# Patient Record
Sex: MALE | Race: WHITE | HISPANIC OR LATINO | Employment: OTHER | URBAN - METROPOLITAN AREA
[De-identification: names, ages, dates, MRNs, and addresses within clinical notes are randomized per-mention and may not be internally consistent; named-entity substitution may affect disease eponyms.]

---

## 2017-09-21 PROBLEM — L40.9 PSORIASIS: Status: ACTIVE | Noted: 2017-09-21

## 2017-09-21 PROBLEM — Z28.21 REFUSED INFLUENZA VACCINE: Status: ACTIVE | Noted: 2017-09-21

## 2017-09-21 PROBLEM — I10 ESSENTIAL HYPERTENSION: Status: ACTIVE | Noted: 2017-09-21

## 2018-04-04 PROBLEM — M65.331 TRIGGER FINGER, RIGHT MIDDLE FINGER: Status: ACTIVE | Noted: 2018-04-04

## 2018-04-04 PROBLEM — R20.2 PARESTHESIA AND PAIN OF BOTH UPPER EXTREMITIES: Status: ACTIVE | Noted: 2018-04-04

## 2018-04-04 PROBLEM — M79.602 PARESTHESIA AND PAIN OF BOTH UPPER EXTREMITIES: Status: ACTIVE | Noted: 2018-04-04

## 2018-04-04 PROBLEM — M25.50 ARTHRALGIA: Status: ACTIVE | Noted: 2018-04-04

## 2018-04-04 PROBLEM — J45.20 MILD INTERMITTENT ASTHMA WITHOUT COMPLICATION: Status: ACTIVE | Noted: 2018-04-04

## 2018-04-04 PROBLEM — Z28.21 REFUSED INFLUENZA VACCINE: Status: RESOLVED | Noted: 2017-09-21 | Resolved: 2018-04-04

## 2018-04-04 PROBLEM — M54.32 LEFT SIDED SCIATICA: Status: ACTIVE | Noted: 2018-04-04

## 2018-04-04 PROBLEM — M79.601 PARESTHESIA AND PAIN OF BOTH UPPER EXTREMITIES: Status: ACTIVE | Noted: 2018-04-04

## 2018-04-26 ENCOUNTER — LAB VISIT (OUTPATIENT)
Dept: LAB | Facility: HOSPITAL | Age: 63
End: 2018-04-26
Attending: FAMILY MEDICINE
Payer: MEDICARE

## 2018-04-26 DIAGNOSIS — Z12.11 SCREENING FOR COLON CANCER: ICD-10-CM

## 2018-04-26 LAB — HEMOCCULT STL QL IA: POSITIVE

## 2018-04-26 PROCEDURE — 82274 ASSAY TEST FOR BLOOD FECAL: CPT

## 2018-11-14 ENCOUNTER — TELEPHONE (OUTPATIENT)
Dept: PHARMACY | Facility: CLINIC | Age: 63
End: 2018-11-14

## 2018-11-29 ENCOUNTER — TELEPHONE (OUTPATIENT)
Dept: PHARMACY | Facility: CLINIC | Age: 63
End: 2018-11-29

## 2018-11-29 NOTE — TELEPHONE ENCOUNTER
Initial Humira 40mg/0.8ml Pen Injection consult completed on . Humira 40mg/0.8ml Pen Injection will be shipped on  to arrive at patient's home on  via [employer]. $ 3.70 copay. Patient will start Humira 40mg/0.8ml Pen Injection on  at MD office. Address confirmed, CC on file. Confirmed 2 patient identifiers - name and . Therapy Appropriate.    Counseled patient on administration directions: Patient declined full consult - will get injection training at [facility] .  - Starter: Inject Humira 80mg (2 pens) into the skin on Day 1.  - Maintenance: Then, Inject Humira 40mg (1pen) into the skin every 14 days starting 1 week after first injection.  - Take out of the refrigerator 30-60 minutes prior to injection.  - Monthly RX will come with gauze, bandaids, and alcohol swabs.   - Patient will use sharps container; once full, per [place] law, he may lock the sharps container and place in her trash. He can then contact the Pharmacy and we will replace the sharps at no additional charge.    Patient was counseled on possible side effects:  - Injection site reaction: redness, soreness, itching, bruising, which should resolve within 3-5 days.   - Increased risk for infections.  If patient becomes sick, patient is to hold Humira use until he is better.     Patient verbalized understanding. Compliance stressed. Patient advised to keep a calendar marking dates of injections to ensure better compliance. Patient advised to call myself or provider should any questions arise. Patient plans to start Humira on  at MD office. Consultation included: indication; storage and handling. Patient understands to report any medication changes to [facility] and provider. All questions answered and addressed to patients satisfaction. I will f/u with her in 1 week from start, [facility] to contact patient in 3 weeks for refills.     Marissa Vargas, PharmD  [facility]  [phone]

## 2019-01-04 ENCOUNTER — TELEPHONE (OUTPATIENT)
Dept: PHARMACY | Facility: CLINIC | Age: 64
End: 2019-01-04

## 2019-02-04 ENCOUNTER — TELEPHONE (OUTPATIENT)
Dept: PHARMACY | Facility: CLINIC | Age: 64
End: 2019-02-04

## 2019-03-01 ENCOUNTER — TELEPHONE (OUTPATIENT)
Dept: PHARMACY | Facility: CLINIC | Age: 64
End: 2019-03-01

## 2019-04-04 ENCOUNTER — TELEPHONE (OUTPATIENT)
Dept: PHARMACY | Facility: CLINIC | Age: 64
End: 2019-04-04

## 2019-04-11 ENCOUNTER — PATIENT OUTREACH (OUTPATIENT)
Dept: ADMINISTRATIVE | Facility: HOSPITAL | Age: 64
End: 2019-04-11

## 2019-04-25 PROBLEM — Z12.11 SCREENING FOR COLON CANCER: Status: ACTIVE | Noted: 2019-04-25

## 2019-04-30 ENCOUNTER — TELEPHONE (OUTPATIENT)
Dept: PHARMACY | Facility: CLINIC | Age: 64
End: 2019-04-30

## 2019-05-23 ENCOUNTER — TELEPHONE (OUTPATIENT)
Dept: PHARMACY | Facility: CLINIC | Age: 64
End: 2019-05-23

## 2019-06-25 ENCOUNTER — TELEPHONE (OUTPATIENT)
Dept: PHARMACY | Facility: CLINIC | Age: 64
End: 2019-06-25

## 2019-06-26 NOTE — TELEPHONE ENCOUNTER
Refill call regarding Humira at [facility]. Will prepare for shipment on  to arrive  with son Vishnu consent. Copay 0.00. Patient has not started any new medications, no missed doses/ side effects. Patient did not have any concerns or questions for the pharmacist at this time.  & address verified. Next injection  with 0 injections on hand.

## 2019-07-23 ENCOUNTER — TELEPHONE (OUTPATIENT)
Dept: PHARMACY | Facility: CLINIC | Age: 64
End: 2019-07-23

## 2019-08-21 ENCOUNTER — TELEPHONE (OUTPATIENT)
Dept: PHARMACY | Facility: CLINIC | Age: 64
End: 2019-08-21

## 2019-09-17 ENCOUNTER — TELEPHONE (OUTPATIENT)
Dept: PHARMACY | Facility: CLINIC | Age: 64
End: 2019-09-17

## 2019-10-21 ENCOUNTER — TELEPHONE (OUTPATIENT)
Dept: PHARMACY | Facility: CLINIC | Age: 64
End: 2019-10-21

## 2019-11-13 ENCOUNTER — TELEPHONE (OUTPATIENT)
Dept: PHARMACY | Facility: CLINIC | Age: 64
End: 2019-11-13

## 2019-11-15 NOTE — TELEPHONE ENCOUNTER
Spoke to patient's son, Vishnu, in regards to Humira refill. Verified to ship on Monday 11/18 for delivery on Tuesday 11/19. $0.00 copay at 004.     Hilton Hutton, PharmD  Clinical Pharmacist  [facility]  P: [phone]

## 2019-12-11 ENCOUNTER — TELEPHONE (OUTPATIENT)
Dept: PHARMACY | Facility: CLINIC | Age: 64
End: 2019-12-11

## 2020-01-10 ENCOUNTER — TELEPHONE (OUTPATIENT)
Dept: PHARMACY | Facility: CLINIC | Age: 65
End: 2020-01-10

## 2020-01-10 NOTE — TELEPHONE ENCOUNTER
Call attempt 1: Unable to LVM regarding Humira refill and follow up. $1,985.14 copay (004). Appears patient has not renewed LIS. Will advise patient to call [phone]. Will get dose count and if needed, encourage patient to reach out to MD office for samples. LIS application may take 3-4 weeks.  is also taking 3-4 weeks.

## 2020-01-28 NOTE — TELEPHONE ENCOUNTER
Per patient's son Vishnu, he has not called to check on the status of LIS.  Informed son to reach out to [facility] and see how long they will be able to fill the Humira. Vishnu verbalized understanding. Consented to follow-up call in 1 week. Patient has 1 Humira injection in hand for today's dose.  Will close patient out and inform provider if so reports patient can continue to receive discounted Humira with [facility].

## 2020-02-07 NOTE — TELEPHONE ENCOUNTER
Spoke with patient's son Vishnu, he will pick-up Humira for patient from [facility] pharmacy [facility]. This is patient's last refill on current rx and LIS was denied. Vishnu will reach-out to dermatologist for more refills and will speak with pharmacy to find out if his father can continue to refill with [facility]. Son consented to a follow-up call in 2 week

## 2020-05-12 ENCOUNTER — TELEPHONE (OUTPATIENT)
Dept: PHARMACY | Facility: CLINIC | Age: 65
End: 2020-05-12

## 2020-05-12 NOTE — TELEPHONE ENCOUNTER
Reached patient's son regarding refill. Confirmed he is taking q 3 weeks, due 6/2. Will call closer to injection date

## 2020-05-18 PROBLEM — Z12.11 SCREENING FOR COLON CANCER: Status: RESOLVED | Noted: 2019-04-25 | Resolved: 2020-05-18

## 2020-05-21 ENCOUNTER — TELEPHONE (OUTPATIENT)
Dept: PHARMACY | Facility: CLINIC | Age: 65
End: 2020-05-21

## 2020-08-14 ENCOUNTER — TELEPHONE (OUTPATIENT)
Dept: PHARMACY | Facility: CLINIC | Age: 65
End: 2020-08-14

## 2020-09-08 ENCOUNTER — TELEPHONE (OUTPATIENT)
Dept: PHARMACY | Facility: CLINIC | Age: 65
End: 2020-09-08

## 2020-09-24 NOTE — TELEPHONE ENCOUNTER
Confirmed Humira shipment  to arrive to patient's home . Patient's son, Vishnu, reported that patient has no doses on hand for next dose due 10/6 and no missed doses in the past month. Address and  verified. $0 copay (004).

## 2020-10-21 ENCOUNTER — SPECIALTY PHARMACY (OUTPATIENT)
Dept: PHARMACY | Facility: CLINIC | Age: 65
End: 2020-10-21

## 2020-10-21 NOTE — TELEPHONE ENCOUNTER
Specialty Pharmacy - Refill Coordination    Specialty Medication Orders Linked to Encounter      Most Recent Value   Medication #1  adalimumab (HUMIRA PEN) 40 mg/0.8 mL PnKt (Order#[id number], Rx#[id number])          Refill Questions - Documented Responses      Most Recent Value   HIPAA/medical authority confirmed?  Yes   Any changes in contact preferences or allowed representatives?  No   Has the patient had any insurance changes?  No   Has the patient had any changes to specialty medication, dose, or instructions?  No   Has the patient started taking any new medications, herbals, or supplements?  No   Has the patient been diagnosed with any new medical conditions?  No   Does the patient have any new allergies to medications or foods?  No   Does the patient have any concerns about side effects?  No   Can the patient store medication/sharps container properly (at the correct temperature, away from children/pets, etc.)?  Yes   Can the patient call emergency services (911) in the event of an emergency?  Yes   Does the patient have any concerns or questions about taking or administering this medication as prescribed?  No   How many doses did the patient miss in the past 4 weeks or since the last fill?  0   How many doses does the patient have on hand?  0   Does the number of doses/days supply remaining match pharmacy expected amounts?  Yes   How will the patient receive the medication?  Mail   When does the patient need to receive the medication?  11/03/20   Shipping Address  Home   Address in [facility] confirmed and updated if neccessary?  Yes   Expected Copay ($)  0   Is the patient able to afford the medication copay?  Yes   Payment Method  zero copay   Days supply of Refill  28   Would patient like to speak to a pharmacist?  No   Do you want to trigger an intervention?  No   Do you want to trigger an additional referral task?  No   Refill activity completed?  Yes   Refill activity plan  Refill scheduled    Shipment/Pickup Date:  10/27/20          Current Outpatient Medications   Medication Sig    adalimumab (HUMIRA PEN) 40 mg/0.8 mL PnKt Inject 1 pen (40 mg total) into the skin every 14 (fourteen) days.    albuterol (PROVENTIL/VENTOLIN HFA) 90 mcg/actuation inhaler Inhale 2 puffs into the lungs every 6 (six) hours as needed for Wheezing.    aspirin (ECOTRIN) 81 MG EC tablet Take 81 mg by mouth once daily.    betamethasone dipropionate (DIPROLENE) 0.05 % cream Apply topically once daily.    budesonide (PULMICORT FLEXHALER) 90 mcg/actuation AePB Inhale 2 puffs (180 mcg total) into the lungs 2 (two) times daily. Controller    hydroCHLOROthiazide (HYDRODIURIL) 25 MG tablet Take 1 tablet (25 mg total) by mouth once daily.    lisinopriL (PRINIVIL,ZESTRIL) 40 MG tablet Take 1 tablet (40 mg total) by mouth every evening.    montelukast (SINGULAIR) 10 mg tablet Take 1 tablet (10 mg total) by mouth every evening.    multivitamin (ONE DAILY MULTIVITAMIN) per tablet Take 1 tablet by mouth once daily.    naproxen (NAPROSYN) 500 MG tablet Take 1 tablet (500 mg total) by mouth 2 (two) times daily with meals.   Last reviewed on 10/21/2020  9:40 AM by Ida Miranda PharmD    Review of patient's allergies indicates:   Allergen Reactions    Influenza virus vaccines Other (See Comments)    Last reviewed on  10/21/2020 9:40 AM by Ida Miranda      Tasks added this encounter   11/23/2020 - Refill Call (Auto Added)   Tasks due within next 3 months   12/24/2020 - Clinical - Follow Up Assesement (90 day)     Ida Miranda PharmD  [facility]  [address] [place] [id number]  Phone: [phone]  Fax: [phone]

## 2020-11-25 ENCOUNTER — SPECIALTY PHARMACY (OUTPATIENT)
Dept: PHARMACY | Facility: CLINIC | Age: 65
End: 2020-11-25

## 2020-11-25 NOTE — TELEPHONE ENCOUNTER
Specialty Pharmacy - Refill Coordination    Specialty Medication Orders Linked to Encounter      Most Recent Value   Medication #1  adalimumab (HUMIRA PEN) 40 mg/0.8 mL PnKt (Order#[id number], Rx#[id number])          Refill Questions - Documented Responses      Most Recent Value   Relationship to patient of person spoken to?  Self   HIPAA/medical authority confirmed?  Yes   Any changes in contact preferences or allowed representatives?  No   Has the patient had any insurance changes?  No   Has the patient had any changes to specialty medication, dose, or instructions?  No   Has the patient started taking any new medications, herbals, or supplements?  No   Has the patient been diagnosed with any new medical conditions?  No   Does the patient have any concerns about side effects?  No   Can the patient store medication/sharps container properly (at the correct temperature, away from children/pets, etc.)?  Yes   Can the patient call emergency services (911) in the event of an emergency?  Yes   Does the patient have any concerns or questions about taking or administering this medication as prescribed?  No   How many doses did the patient miss in the past 4 weeks or since the last fill?  0   How many doses does the patient have on hand?  0   How many days does the patient report on hand quantity will last?  6   Does the number of doses/days supply remaining match pharmacy expected amounts?  Yes   Does the patient feel that this medication is effective?  Yes   During the past 4 weeks, has patient missed any activities due to condition or medication?  No   During the past 4 weeks, did patient have any of the following urgent care visits?  None   How will the patient receive the medication?  Mail   When does the patient need to receive the medication?  12/01/20   Shipping Address  Home   Address in [facility] confirmed and updated if neccessary?  Yes   Expected Copay ($)  0   Is the patient able to afford the  medication copay?  Yes   Payment Method  zero copay   Days supply of Refill  28   Would patient like to speak to a pharmacist?  No   Do you want to trigger an intervention?  No   Do you want to trigger an additional referral task?  No   Refill activity completed?  Yes   Refill activity plan  Refill scheduled   Shipment/Pickup Date:  11/30/20          Current Outpatient Medications   Medication Sig    adalimumab (HUMIRA PEN) 40 mg/0.8 mL PnKt Inject 1 pen (40 mg total) into the skin every 14 (fourteen) days.    albuterol (PROVENTIL/VENTOLIN HFA) 90 mcg/actuation inhaler Inhale 2 puffs into the lungs every 6 (six) hours as needed for Wheezing.    aspirin (ECOTRIN) 81 MG EC tablet Take 81 mg by mouth once daily.    betamethasone dipropionate (DIPROLENE) 0.05 % cream Apply topically once daily.    budesonide (PULMICORT FLEXHALER) 90 mcg/actuation AePB Inhale 2 puffs (180 mcg total) into the lungs 2 (two) times daily. Controller    hydroCHLOROthiazide (HYDRODIURIL) 25 MG tablet Take 1 tablet (25 mg total) by mouth once daily.    hyoscyamine (ANASPAZ,LEVSIN) 0.125 mg Tab Take 1 tablet (125 mcg total) by mouth every 4 (four) hours as needed.    lisinopriL (PRINIVIL,ZESTRIL) 40 MG tablet Take 1 tablet (40 mg total) by mouth every evening.    montelukast (SINGULAIR) 10 mg tablet Take 1 tablet (10 mg total) by mouth every evening.    multivitamin (ONE DAILY MULTIVITAMIN) per tablet Take 1 tablet by mouth once daily.    naproxen (NAPROSYN) 500 MG tablet Take 1 tablet (500 mg total) by mouth 2 (two) times daily with meals.    ondansetron (ZOFRAN) 4 MG tablet Take 1 tablet (4 mg total) by mouth every 6 (six) hours.   Last reviewed on 11/25/2020  4:18 PM by Loida Redding    Review of patient's allergies indicates:   Allergen Reactions    Influenza virus vaccines Other (See Comments)    Last reviewed on  11/25/2020 4:18 PM by Loida Redding      Tasks added this encounter   No tasks added.   Tasks due within next 3  months   12/24/2020 - Clinical - Follow Up Assesement (90 day)  11/23/2020 - Refill Call (Auto Added)     RENATO ROBLES  [facility]  [address]  [facility] [address]  Phone: [phone]  Fax: [phone]

## 2020-12-21 ENCOUNTER — SPECIALTY PHARMACY (OUTPATIENT)
Dept: PHARMACY | Facility: CLINIC | Age: 65
End: 2020-12-21

## 2020-12-21 NOTE — TELEPHONE ENCOUNTER
Specialty Pharmacy - Refill Coordination    Specialty Medication Orders Linked to Encounter      Most Recent Value   Medication #1  adalimumab (HUMIRA PEN) 40 mg/0.8 mL PnKt (Order#[id number], Rx#[id number])          Refill Questions - Documented Responses      Most Recent Value   Relationship to patient of person spoken to?  Self   HIPAA/medical authority confirmed?  Yes   Any changes in contact preferences or allowed representatives?  No   Has the patient had any insurance changes?  No   Has the patient had any changes to specialty medication, dose, or instructions?  No   Has the patient started taking any new medications, herbals, or supplements?  No   Has the patient been diagnosed with any new medical conditions?  No   Does the patient have any new allergies to medications or foods?  No   Does the patient have any concerns about side effects?  No   Can the patient store medication/sharps container properly (at the correct temperature, away from children/pets, etc.)?  Yes   Can the patient call emergency services (911) in the event of an emergency?  Yes   Does the patient have any concerns or questions about taking or administering this medication as prescribed?  No   How many doses did the patient miss in the past 4 weeks or since the last fill?  0   How many doses does the patient have on hand?  0   How many days does the patient report on hand quantity will last?  0   Does the number of doses/days supply remaining match pharmacy expected amounts?  Yes   Does the patient feel that this medication is effective?  Yes   During the past 4 weeks, has patient missed any activities due to condition or medication?  No   During the past 4 weeks, did patient have any of the following urgent care visits?  None   How will the patient receive the medication?  Mail   When does the patient need to receive the medication?  12/29/20   Shipping Address  Home   Expected Copay ($)  0   Is the patient able to afford the medication  copay?  Yes   Payment Method  zero copay   Days supply of Refill  28   Would patient like to speak to a pharmacist?  No   Do you want to trigger an intervention?  No   Do you want to trigger an additional referral task?  No   Refill activity completed?  Yes   Refill activity plan  Refill scheduled   Shipment/Pickup Date:  12/22/20          Current Outpatient Medications   Medication Sig    adalimumab (HUMIRA PEN) 40 mg/0.8 mL PnKt Inject 1 pen (40 mg total) into the skin every 14 (fourteen) days.    albuterol (PROVENTIL/VENTOLIN HFA) 90 mcg/actuation inhaler Inhale 2 puffs into the lungs every 6 (six) hours as needed for Wheezing.    aspirin (ECOTRIN) 81 MG EC tablet Take 81 mg by mouth once daily.    betamethasone dipropionate (DIPROLENE) 0.05 % cream Apply topically once daily.    budesonide (PULMICORT FLEXHALER) 90 mcg/actuation AePB Inhale 2 puffs (180 mcg total) into the lungs 2 (two) times daily. Controller    hydroCHLOROthiazide (HYDRODIURIL) 25 MG tablet Take 1 tablet (25 mg total) by mouth once daily.    hyoscyamine (ANASPAZ,LEVSIN) 0.125 mg Tab Take 1 tablet (125 mcg total) by mouth every 4 (four) hours as needed.    lisinopriL (PRINIVIL,ZESTRIL) 40 MG tablet Take 1 tablet (40 mg total) by mouth every evening.    montelukast (SINGULAIR) 10 mg tablet Take 1 tablet (10 mg total) by mouth every evening.    multivitamin (ONE DAILY MULTIVITAMIN) per tablet Take 1 tablet by mouth once daily.    naproxen (NAPROSYN) 500 MG tablet Take 1 tablet (500 mg total) by mouth 2 (two) times daily with meals.    ondansetron (ZOFRAN) 4 MG tablet Take 1 tablet (4 mg total) by mouth every 6 (six) hours.   Last reviewed on 11/25/2020  4:18 PM by Loida Redding    Review of patient's allergies indicates:   Allergen Reactions    Influenza virus vaccines Other (See Comments)    Last reviewed on  11/25/2020 4:18 PM by Loida Redding      Tasks added this encounter   1/18/2021 - Refill Call (Auto Added)   Tasks due  within next 3 months   No tasks due.     Julio Cesar Chakraborty  [facility]  [address] [address]  Phone: [phone]  Fax: [phone]

## 2021-02-12 ENCOUNTER — SPECIALTY PHARMACY (OUTPATIENT)
Dept: PHARMACY | Facility: CLINIC | Age: 66
End: 2021-02-12

## 2021-03-12 ENCOUNTER — SPECIALTY PHARMACY (OUTPATIENT)
Dept: PHARMACY | Facility: CLINIC | Age: 66
End: 2021-03-12

## 2021-04-05 ENCOUNTER — SPECIALTY PHARMACY (OUTPATIENT)
Dept: PHARMACY | Facility: CLINIC | Age: 66
End: 2021-04-05

## 2021-05-04 ENCOUNTER — SPECIALTY PHARMACY (OUTPATIENT)
Dept: PHARMACY | Facility: CLINIC | Age: 66
End: 2021-05-04

## 2021-05-04 ENCOUNTER — PATIENT MESSAGE (OUTPATIENT)
Dept: RESEARCH | Facility: HOSPITAL | Age: 66
End: 2021-05-04

## 2021-06-22 ENCOUNTER — SPECIALTY PHARMACY (OUTPATIENT)
Dept: PHARMACY | Facility: CLINIC | Age: 66
End: 2021-06-22

## 2021-07-15 ENCOUNTER — SPECIALTY PHARMACY (OUTPATIENT)
Dept: PHARMACY | Facility: CLINIC | Age: 66
End: 2021-07-15

## 2021-08-13 ENCOUNTER — SPECIALTY PHARMACY (OUTPATIENT)
Dept: PHARMACY | Facility: CLINIC | Age: 66
End: 2021-08-13

## 2021-09-17 ENCOUNTER — SPECIALTY PHARMACY (OUTPATIENT)
Dept: PHARMACY | Facility: CLINIC | Age: 66
End: 2021-09-17

## 2021-10-25 ENCOUNTER — SPECIALTY PHARMACY (OUTPATIENT)
Dept: PHARMACY | Facility: CLINIC | Age: 66
End: 2021-10-25
Payer: MEDICARE

## 2021-11-23 ENCOUNTER — SPECIALTY PHARMACY (OUTPATIENT)
Dept: PHARMACY | Facility: CLINIC | Age: 66
End: 2021-11-23
Payer: MEDICARE

## 2022-01-04 ENCOUNTER — SPECIALTY PHARMACY (OUTPATIENT)
Dept: PHARMACY | Facility: CLINIC | Age: 67
End: 2022-01-04
Payer: MEDICARE

## 2022-01-06 NOTE — TELEPHONE ENCOUNTER
Specialty Pharmacy - Refill Coordination    Specialty Medication Orders Linked to Encounter    Flowsheet Row Most Recent Value   Medication #1 adalimumab (HUMIRA PEN) PnKt injection (Order#[id number], Rx#)          Refill Questions - Documented Responses    Flowsheet Row Most Recent Value   Patient Availability and HIPAA Verification    Does patient want to proceed with activity? Yes   HIPAA/medical authority confirmed? Yes   Relationship to patient of person spoken to? Child   Refill Screening Questions    Changes to allergies? No   Changes to medications? No   New conditions since last clinic visit? No   Unplanned office visit, urgent care, ED, or hospital admission in the last 4 weeks? Yes  [[facility] visit for [facility],  given abx]   How does patient/caregiver feel medication is working? Good   Financial problems or insurance changes? No   How many doses of your specialty medications were missed in the last 4 weeks? 1  [Delayed 1 dose due to URI]   Why were doses missed? Felt ill or sick   Would patient like to speak to a pharmacist? No   When does the patient need to receive the medication? 01/19/22   Refill Delivery Questions    How will the patient receive the medication? Delivery Angie   When does the patient need to receive the medication? 01/19/22   Shipping Address Home   Address in [facility] confirmed and updated if neccessary? Yes   Expected Copay ($) 9.85   Is the patient able to afford the medication copay? Yes   Payment Method CC on file   Days supply of Refill 28   Supplies needed? No supplies needed   Refill activity completed? Yes   Refill activity plan Refill scheduled   Shipment/Pickup Date: 01/13/22          Current Outpatient Medications   Medication Sig    adalimumab (HUMIRA PEN) PnKt injection Inject 1 pen (40 mg total) into the skin every 14 (fourteen) days.    aspirin (ECOTRIN) 81 MG EC tablet Take 81 mg by mouth once daily.    betamethasone dipropionate (DIPROLENE) 0.05 % cream Apply  topically once daily.    budesonide (PULMICORT FLEXHALER) 90 mcg/actuation AePB Inhale 2 puffs (180 mcg total) into the lungs 2 (two) times daily. Controller (Patient not taking: Reported on 2/9/2021)    hydroCHLOROthiazide (HYDRODIURIL) 25 MG tablet Take 1 tablet by mouth once daily    hyoscyamine (ANASPAZ,LEVSIN) 0.125 mg Tab Take 1 tablet (125 mcg total) by mouth every 4 (four) hours as needed.    lisinopriL (PRINIVIL,ZESTRIL) 40 MG tablet TAKE 1 TABLET BY MOUTH ONCE DAILY IN THE EVENING    montelukast (SINGULAIR) 10 mg tablet Take 1 tablet (10 mg total) by mouth every evening.    multivitamin (THERAGRAN) per tablet Take 1 tablet by mouth once daily.    naproxen (NAPROSYN) 500 MG tablet TAKE 1 TABLET BY MOUTH TWICE DAILY WITH MEALS    ondansetron (ZOFRAN) 4 MG tablet Take 1 tablet (4 mg total) by mouth every 6 (six) hours. (Patient not taking: No sig reported)    VENTOLIN HFA 90 mcg/actuation inhaler INHALE 2 PUFFS BY MOUTH EVERY 6 HOURS AS NEEDED FOR WHEEZING   Last reviewed on 12/7/2021  2:59 PM by Esther Toribio MA    Review of patient's allergies indicates:   Allergen Reactions    Influenza virus vaccines Other (See Comments)    Last reviewed on  1/4/2022 4:41 PM by John Benavides      Tasks added this encounter   2/9/2022 - Refill Call (Auto Added)   Tasks due within next 3 months   No tasks due.     Ida Miranda, PharmD  [facility]  [address] [place] [id number]  Phone: [phone]  Fax: [phone]

## 2022-02-09 ENCOUNTER — SPECIALTY PHARMACY (OUTPATIENT)
Dept: PHARMACY | Facility: CLINIC | Age: 67
End: 2022-02-09
Payer: MEDICARE

## 2022-02-09 NOTE — TELEPHONE ENCOUNTER
2/9 - Incoming call from pt's son (Vishnu), stated pt had flu and fever last week, went to urgent care, took Tylenol for fever and Guaifenesin for cough. Did not take humira dose, unsure if has dose and when will father be taking medication. Will call [facility] back today around 5pm to confirm before setting up refill.    PTL

## 2022-02-16 ENCOUNTER — PATIENT MESSAGE (OUTPATIENT)
Dept: PHARMACY | Facility: CLINIC | Age: 67
End: 2022-02-16
Payer: MEDICARE

## 2022-02-16 NOTE — TELEPHONE ENCOUNTER
Spoke to pt's son. He reported pt's fever has resolved, but is still experiencing cough. Pt will continue to hold Humira until all symptoms have resolved. He reported pt has 1 pen on hand. He is agreeable to f/u call next week to see how pt is feeling and if cleared to resume Humira. If so, will schedule refill for pt.

## 2022-02-24 ENCOUNTER — SPECIALTY PHARMACY (OUTPATIENT)
Dept: PHARMACY | Facility: CLINIC | Age: 67
End: 2022-02-24
Payer: MEDICARE

## 2022-02-24 DIAGNOSIS — L40.9 PSORIASIS: Primary | ICD-10-CM

## 2022-02-24 NOTE — TELEPHONE ENCOUNTER
Specialty Pharmacy - Refill Coordination    Specialty Medication Orders Linked to Encounter    Flowsheet Row Most Recent Value   Medication #1 adalimumab (HUMIRA PEN) PnKt injection (Order#[id number], Rx#[id number])      Pt son reports he resume Humira 2/22/22 without any issue. He reports coughing has resolved and all symptoms of sickness resolved. Pt son would like to go ahead and schedule delivery since they will be busy next week. Refill set up pt advised to keep Humira stored in fridge until needed. Voiced understanding.  No further questions or concerns.     Refill Questions - Documented Responses    Flowsheet Row Most Recent Value   Patient Availability and HIPAA Verification    Does patient want to proceed with activity? Yes   HIPAA/medical authority confirmed? Yes   Relationship to patient of person spoken to? Self   Refill Screening Questions    Changes to allergies? No   Changes to medications? --  [albuterol nebulizer- no DDi]   New conditions since last clinic visit? No   Unplanned office visit, urgent care, ED, or hospital admission in the last 4 weeks? --  [addressed in ivent]   How does patient/caregiver feel medication is working? Good   Financial problems or insurance changes? No   How many doses of your specialty medications were missed in the last 4 weeks? 1  [addressed in ivent- no further intervention necessary]   Why were doses missed? Felt ill or sick  [addressed in ivent]   Would patient like to speak to a pharmacist? No   When does the patient need to receive the medication? 03/08/22   Refill Delivery Questions    How will the patient receive the medication? Delivery Angie   When does the patient need to receive the medication? 03/08/22   Shipping Address Home   Address in [facility] confirmed and updated if neccessary? Yes   Expected Copay ($) 9.85   Is the patient able to afford the medication copay? Yes   Payment Method CC on file   Days supply of Refill 28   Supplies needed? No  supplies needed   Refill activity completed? Yes   Refill activity plan Refill scheduled   Shipment/Pickup Date: 03/03/22          Current Outpatient Medications   Medication Sig    adalimumab (HUMIRA PEN) PnKt injection Inject 1 pen (40 mg total) into the skin every 14 (fourteen) days.    aspirin (ECOTRIN) 81 MG EC tablet Take 81 mg by mouth once daily.    betamethasone dipropionate (DIPROLENE) 0.05 % cream Apply topically once daily.    budesonide (PULMICORT FLEXHALER) 90 mcg/actuation AePB Inhale 2 puffs (180 mcg total) into the lungs 2 (two) times daily. Controller (Patient not taking: Reported on 2/9/2021)    hydroCHLOROthiazide (HYDRODIURIL) 25 MG tablet Take 1 tablet by mouth once daily    hyoscyamine (ANASPAZ,LEVSIN) 0.125 mg Tab Take 1 tablet (125 mcg total) by mouth every 4 (four) hours as needed.    lisinopriL (PRINIVIL,ZESTRIL) 40 MG tablet TAKE 1 TABLET BY MOUTH ONCE DAILY IN THE EVENING    montelukast (SINGULAIR) 10 mg tablet Take 1 tablet (10 mg total) by mouth every evening.    multivitamin (THERAGRAN) per tablet Take 1 tablet by mouth once daily.    naproxen (NAPROSYN) 500 MG tablet TAKE 1 TABLET BY MOUTH TWICE DAILY WITH MEALS    ondansetron (ZOFRAN) 4 MG tablet Take 1 tablet (4 mg total) by mouth every 6 (six) hours. (Patient not taking: No sig reported)    VENTOLIN HFA 90 mcg/actuation inhaler INHALE 2 PUFFS BY MOUTH EVERY 6 HOURS AS NEEDED FOR WHEEZING   Last reviewed on 12/7/2021  2:59 PM by Esther Toribio MA    Review of patient's allergies indicates:   Allergen Reactions    Influenza virus vaccines Other (See Comments)    Last reviewed on  1/4/2022 4:41 PM by John Benavides      Tasks added this encounter   3/29/2022 - Refill Call (Auto Added)   Tasks due within next 3 months   No tasks due.     Belkis Gage, LaurieD  [facility]  [address] [place] [address]  Phone: [phone]  Fax: [phone]

## 2022-03-29 ENCOUNTER — SPECIALTY PHARMACY (OUTPATIENT)
Dept: PHARMACY | Facility: CLINIC | Age: 67
End: 2022-03-29
Payer: MEDICARE

## 2022-03-29 NOTE — TELEPHONE ENCOUNTER
Outgoing call regarding Humira refill. Pts son Vishnu stated that he has 1 dose on hand and it not due to inject till April 5th and Vishnu stated he would like a call back on 4/5/22 to set up next refill please.

## 2022-04-05 NOTE — TELEPHONE ENCOUNTER
Specialty Pharmacy - Refill Coordination    Specialty Medication Orders Linked to Encounter    Flowsheet Row Most Recent Value   Medication #1 adalimumab (HUMIRA PEN) PnKt injection (Order#[id number], Rx#[id number])          Refill Questions - Documented Responses    Flowsheet Row Most Recent Value   Patient Availability and HIPAA Verification    Does patient want to proceed with activity? Yes   HIPAA/medical authority confirmed? Yes   Relationship to patient of person spoken to? Child   Refill Screening Questions    Changes to allergies? No   Changes to medications? No   New conditions since last clinic visit? No   Unplanned office visit, urgent care, ED, or hospital admission in the last 4 weeks? No   How does patient/caregiver feel medication is working? Excellent   Financial problems or insurance changes? No   How many doses of your specialty medications were missed in the last 4 weeks? 0   Would patient like to speak to a pharmacist? No   When does the patient need to receive the medication? 04/06/22   Refill Delivery Questions    How will the patient receive the medication? Delivery Angie   When does the patient need to receive the medication? 04/06/22   Shipping Address Home   Address in [facility] confirmed and updated if neccessary? Yes   Expected Copay ($) 0   Is the patient able to afford the medication copay? Yes   Payment Method zero copay   Days supply of Refill 28   Supplies needed? No supplies needed   Refill activity completed? Yes   Refill activity plan Refill scheduled   Shipment/Pickup Date: 04/06/22          Current Outpatient Medications   Medication Sig    adalimumab (HUMIRA PEN) PnKt injection Inject 1 pen (40 mg total) into the skin every 14 (fourteen) days.    aspirin (ECOTRIN) 81 MG EC tablet Take 81 mg by mouth once daily.    budesonide (PULMICORT FLEXHALER) 90 mcg/actuation AePB Inhale 2 puffs (180 mcg total) into the lungs 2 (two) times daily. Controller (Patient not taking:  Reported on 2/9/2021)    hydroCHLOROthiazide (HYDRODIURIL) 25 MG tablet Take 1 tablet by mouth once daily    hyoscyamine (ANASPAZ,LEVSIN) 0.125 mg Tab Take 1 tablet (125 mcg total) by mouth every 4 (four) hours as needed.    lisinopriL (PRINIVIL,ZESTRIL) 40 MG tablet TAKE 1 TABLET BY MOUTH ONCE DAILY IN THE EVENING    montelukast (SINGULAIR) 10 mg tablet Take 1 tablet (10 mg total) by mouth every evening. (Patient not taking: Reported on 4/5/2022)    multivitamin (THERAGRAN) per tablet Take 1 tablet by mouth once daily.    naproxen (NAPROSYN) 500 MG tablet TAKE 1 TABLET BY MOUTH TWICE DAILY WITH MEALS    TRELEGY ELLIPTA 200-62.5-25 mcg inhaler INHALE 1 PUFF ONCE DAILY    VENTOLIN HFA 90 mcg/actuation inhaler INHALE 2 PUFFS BY MOUTH EVERY 6 HOURS AS NEEDED FOR WHEEZING   Last reviewed on 4/5/2022  8:33 AM by John Benavides MD    Review of patient's allergies indicates:   Allergen Reactions    Influenza virus vaccines Other (See Comments)    Last reviewed on  4/5/2022 8:33 AM by John Benavides      Tasks added this encounter   No tasks added.   Tasks due within next 3 months   4/5/2022 - Refill Call (Auto Added)     Laurie PierceD  [facility]  [facility] [place] [address]  [facility] [address]  Phone: [phone]  Fax: [phone]

## 2022-04-18 ENCOUNTER — PATIENT MESSAGE (OUTPATIENT)
Dept: ADMINISTRATIVE | Facility: OTHER | Age: 67
End: 2022-04-18
Payer: MEDICARE

## 2022-05-18 ENCOUNTER — SPECIALTY PHARMACY (OUTPATIENT)
Dept: PHARMACY | Facility: CLINIC | Age: 67
End: 2022-05-18
Payer: MEDICARE

## 2022-05-18 DIAGNOSIS — L40.9 PSORIASIS: Primary | ICD-10-CM

## 2022-05-18 NOTE — TELEPHONE ENCOUNTER
Specialty Pharmacy - Refill Coordination    Specialty Medication Orders Linked to Encounter    Flowsheet Row Most Recent Value   Medication #1 adalimumab (HUMIRA PEN) PnKt injection (Order#[id number], Rx#[id number])          Refill Questions - Documented Responses    Flowsheet Row Most Recent Value   Patient Availability and HIPAA Verification    Does patient want to proceed with activity? Yes   HIPAA/medical authority confirmed? Yes   Relationship to patient of person spoken to? Child   Refill Screening Questions    Changes to allergies? No   Changes to medications? No   New conditions since last clinic visit? No   Unplanned office visit, urgent care, ED, or hospital admission in the last 4 weeks? No   How does patient/caregiver feel medication is working? Very good   Financial problems or insurance changes? No   How many doses of your specialty medications were missed in the last 4 weeks? 0   Would patient like to speak to a pharmacist? No   When does the patient need to receive the medication? 05/22/22   Refill Delivery Questions    How will the patient receive the medication? Delivery Angie   When does the patient need to receive the medication? 05/22/22   Shipping Address Home   Address in [facility] confirmed and updated if neccessary? Yes   Expected Copay ($) 0   Is the patient able to afford the medication copay? Yes   Payment Method zero copay   Days supply of Refill 28   Supplies needed? No supplies needed   Refill activity completed? Yes   Refill activity plan Refill scheduled   Shipment/Pickup Date: 05/22/22          Current Outpatient Medications   Medication Sig    adalimumab (HUMIRA PEN) PnKt injection Inject 1 pen (40 mg total) into the skin every 14 (fourteen) days.    aspirin (ECOTRIN) 81 MG EC tablet Take 81 mg by mouth once daily.    azithromycin (Z-JOYCELYN) 250 MG tablet Take 1 tablet (250 mg total) by mouth once daily. Take first 2 tablets together, then 1 every day until finished.     budesonide (PULMICORT FLEXHALER) 90 mcg/actuation AePB Inhale 2 puffs (180 mcg total) into the lungs 2 (two) times daily. Controller (Patient not taking: Reported on 2/9/2021)    hydroCHLOROthiazide (HYDRODIURIL) 25 MG tablet Take 1 tablet by mouth once daily    hyoscyamine (ANASPAZ,LEVSIN) 0.125 mg Tab Take 1 tablet (125 mcg total) by mouth every 4 (four) hours as needed.    lisinopriL (PRINIVIL,ZESTRIL) 40 MG tablet TAKE 1 TABLET BY MOUTH ONCE DAILY IN THE EVENING    montelukast (SINGULAIR) 10 mg tablet Take 1 tablet (10 mg total) by mouth every evening. (Patient not taking: Reported on 4/5/2022)    multivitamin (THERAGRAN) per tablet Take 1 tablet by mouth once daily.    naproxen (NAPROSYN) 500 MG tablet TAKE 1 TABLET BY MOUTH TWICE DAILY WITH MEALS    TRELEGY ELLIPTA 200-62.5-25 mcg inhaler INHALE 1 PUFF ONCE DAILY    VENTOLIN HFA 90 mcg/actuation inhaler INHALE 2 PUFFS BY MOUTH EVERY 6 HOURS AS NEEDED FOR WHEEZING   Last reviewed on 4/5/2022  8:33 AM by John Benavides MD    Review of patient's allergies indicates:   Allergen Reactions    Influenza virus vaccines Other (See Comments)    Last reviewed on  4/23/2022 3:17 PM by Dana Moody      Tasks added this encounter   6/12/2022 - Refill Call (Auto Added)   Tasks due within next 3 months   No tasks due.     Belen Tavares, PharmD  [facility]  [address] [place] [address]  Phone: [phone]  Fax: [phone]

## 2022-06-10 ENCOUNTER — SPECIALTY PHARMACY (OUTPATIENT)
Dept: PHARMACY | Facility: CLINIC | Age: 67
End: 2022-06-10
Payer: MEDICARE

## 2022-06-10 NOTE — TELEPHONE ENCOUNTER
Specialty Pharmacy - Clinical Reassessment    Specialty Medication Orders Linked to Encounter    Flowsheet Row Most Recent Value   Medication #1 adalimumab (HUMIRA PEN) PnKt injection (Order#[id number], Rx#[id number])        Patient Diagnosis   L40.9 - Psoriasis    Specialty clinical pharmacist review completed for an annual review of reassessment. Reviewed the following areas: current med list, reports of adverse effects, adherence and progress towards therapeutic goals.    Recommendations: none at this time.   Patient had recent gaps in therapy due to illness. Patient appropriately held doses until illness resolved, received refills once safe to resume therapy.    Tasks added this encounter   3/10/2023 - Clinical - Follow Up Assesement (Annual)   Tasks due within next 3 months   6/12/2022 - Refill Call (Auto Added)     Karo Robb, PharmD  [facility]  [address] [place], [place] [address]  [facility] [address]  Phone: [phone]  Fax: [phone]

## 2022-06-14 ENCOUNTER — SPECIALTY PHARMACY (OUTPATIENT)
Dept: PHARMACY | Facility: CLINIC | Age: 67
End: 2022-06-14
Payer: MEDICARE

## 2022-06-14 NOTE — TELEPHONE ENCOUNTER
Specialty Pharmacy - Refill Coordination    Specialty Medication Orders Linked to Encounter    Flowsheet Row Most Recent Value   Medication #1 adalimumab (HUMIRA PEN) PnKt injection (Order#[id number], Rx#[id number])          Refill Questions - Documented Responses    Flowsheet Row Most Recent Value   Patient Availability and HIPAA Verification    Does patient want to proceed with activity? Yes   HIPAA/medical authority confirmed? Yes   Relationship to patient of person spoken to? Self   Refill Screening Questions    Changes to allergies? No   Changes to medications? Yes   New conditions since last clinic visit? No   Unplanned office visit, urgent care, ED, or hospital admission in the last 4 weeks? No   How does patient/caregiver feel medication is working? Good   Financial problems or insurance changes? No   How many doses of your specialty medications were missed in the last 4 weeks? 0   Would patient like to speak to a pharmacist? No   When does the patient need to receive the medication? 06/23/22   Refill Delivery Questions    How will the patient receive the medication? Delivery Angie   When does the patient need to receive the medication? 06/23/22   Shipping Address Home   Address in [facility] confirmed and updated if neccessary? Yes   Expected Copay ($) 0   Is the patient able to afford the medication copay? Yes   Payment Method zero copay   Days supply of Refill 28   Supplies needed? No supplies needed   Refill activity completed? Yes   Refill activity plan Refill scheduled   Shipment/Pickup Date: 06/17/22          Current Outpatient Medications   Medication Sig    adalimumab (HUMIRA PEN) PnKt injection Inject 1 pen (40 mg total) into the skin every 14 (fourteen) days.    aspirin (ECOTRIN) 81 MG EC tablet Take 81 mg by mouth once daily.    azithromycin (Z-JOYCELYN) 250 MG tablet Take 1 tablet (250 mg total) by mouth once daily. Take first 2 tablets together, then 1 every day until finished.     budesonide (PULMICORT FLEXHALER) 90 mcg/actuation AePB Inhale 2 puffs (180 mcg total) into the lungs 2 (two) times daily. Controller (Patient not taking: Reported on 2/9/2021)    hydroCHLOROthiazide (HYDRODIURIL) 25 MG tablet Take 1 tablet by mouth once daily    hyoscyamine (ANASPAZ,LEVSIN) 0.125 mg Tab Take 1 tablet (125 mcg total) by mouth every 4 (four) hours as needed.    lisinopriL (PRINIVIL,ZESTRIL) 40 MG tablet TAKE 1 TABLET BY MOUTH ONCE DAILY IN THE EVENING    montelukast (SINGULAIR) 10 mg tablet Take 1 tablet (10 mg total) by mouth every evening. (Patient not taking: Reported on 4/5/2022)    multivitamin (THERAGRAN) per tablet Take 1 tablet by mouth once daily.    naproxen (NAPROSYN) 500 MG tablet TAKE 1 TABLET BY MOUTH TWICE DAILY WITH MEALS    TRELEGY ELLIPTA 200-62.5-25 mcg inhaler INHALE 1 PUFF ONCE DAILY    VENTOLIN HFA 90 mcg/actuation inhaler INHALE 2 PUFFS BY MOUTH EVERY 6 HOURS AS NEEDED FOR WHEEZING   Last reviewed on 4/5/2022  8:33 AM by John Benavides MD    Review of patient's allergies indicates:   Allergen Reactions    Influenza virus vaccines Other (See Comments)    Last reviewed on  4/23/2022 3:17 PM by Dana Moody    Interventions added this encounter   Open: [facility] Patient Intervention     Tasks added this encounter   7/14/2022 - Refill Call (Auto Added)   Tasks due within next 3 months   No tasks due.     Aarti Dorsey, Patient Care Assistant  [facility]  [facility] [address]  [facility] [address]  Phone: [phone]  Fax: [phone]

## 2022-07-14 ENCOUNTER — SPECIALTY PHARMACY (OUTPATIENT)
Dept: PHARMACY | Facility: CLINIC | Age: 67
End: 2022-07-14
Payer: MEDICARE

## 2022-07-14 NOTE — TELEPHONE ENCOUNTER
Specialty Pharmacy - Refill Coordination    Specialty Medication Orders Linked to Encounter    Flowsheet Row Most Recent Value   Medication #1 adalimumab (HUMIRA PEN) PnKt injection (Order#[id number], Rx#[id number])          Refill Questions - Documented Responses    Flowsheet Row Most Recent Value   Patient Availability and HIPAA Verification    Does patient want to proceed with activity? Yes   HIPAA/medical authority confirmed? Yes   Relationship to patient of person spoken to? Child   Refill Screening Questions    Changes to allergies? No   Changes to medications? No   New conditions since last clinic visit? No   Unplanned office visit, urgent care, ED, or hospital admission in the last 4 weeks? No   How does patient/caregiver feel medication is working? Good   Financial problems or insurance changes? No   How many doses of your specialty medications were missed in the last 4 weeks? 0   Would patient like to speak to a pharmacist? No   When does the patient need to receive the medication? 07/21/22   Refill Delivery Questions    How will the patient receive the medication? Delivery Angie   When does the patient need to receive the medication? 07/21/22   Shipping Address Home   Address in [facility] confirmed and updated if neccessary? Yes   Expected Copay ($) 0   Is the patient able to afford the medication copay? Yes   Payment Method zero copay   Days supply of Refill 28   Supplies needed? No supplies needed   Refill activity completed? Yes   Refill activity plan Refill scheduled   Shipment/Pickup Date: 07/18/22          Current Outpatient Medications   Medication Sig    adalimumab (HUMIRA PEN) PnKt injection Inject 1 pen (40 mg total) into the skin every 14 (fourteen) days.    aspirin (ECOTRIN) 81 MG EC tablet Take 81 mg by mouth once daily.    azithromycin (Z-JOYCELYN) 250 MG tablet Take 1 tablet (250 mg total) by mouth once daily. Take first 2 tablets together, then 1 every day until finished.     budesonide (PULMICORT FLEXHALER) 90 mcg/actuation AePB Inhale 2 puffs (180 mcg total) into the lungs 2 (two) times daily. Controller (Patient not taking: Reported on 2/9/2021)    hydroCHLOROthiazide (HYDRODIURIL) 25 MG tablet Take 1 tablet by mouth once daily    hyoscyamine (ANASPAZ,LEVSIN) 0.125 mg Tab Take 1 tablet (125 mcg total) by mouth every 4 (four) hours as needed.    lisinopriL (PRINIVIL,ZESTRIL) 40 MG tablet TAKE 1 TABLET BY MOUTH ONCE DAILY IN THE EVENING    montelukast (SINGULAIR) 10 mg tablet Take 1 tablet (10 mg total) by mouth every evening. (Patient not taking: Reported on 4/5/2022)    multivitamin (THERAGRAN) per tablet Take 1 tablet by mouth once daily.    naproxen (NAPROSYN) 500 MG tablet TAKE 1 TABLET BY MOUTH TWICE DAILY WITH MEALS    TRELEGY ELLIPTA 200-62.5-25 mcg inhaler INHALE 1 PUFF ONCE DAILY    VENTOLIN HFA 90 mcg/actuation inhaler INHALE 2 PUFFS BY MOUTH EVERY 6 HOURS AS NEEDED FOR WHEEZING   Last reviewed on 4/5/2022  8:33 AM by John Benavides MD    Review of patient's allergies indicates:   Allergen Reactions    Influenza virus vaccines Other (See Comments)    Last reviewed on  4/23/2022 3:17 PM by Dana Moody      Tasks added this encounter   8/11/2022 - Refill Call (Auto Added)   Tasks due within next 3 months   No tasks due.     Daphnie Felipe [facility] Pharm[facility]  [address] [place] [address]  Phone: [phone]  Fax: [phone]

## 2022-07-20 NOTE — TELEPHONE ENCOUNTER
Package was lost. Called Insurance and received Lost package over ride and Fullfillment is reaching back out to resend Medication to pt.

## 2022-08-11 ENCOUNTER — SPECIALTY PHARMACY (OUTPATIENT)
Dept: PHARMACY | Facility: CLINIC | Age: 67
End: 2022-08-11
Payer: MEDICARE

## 2022-08-11 NOTE — TELEPHONE ENCOUNTER
Specialty Pharmacy - Refill Coordination    Specialty Medication Orders Linked to Encounter    Flowsheet Row Most Recent Value   Medication #1 adalimumab (HUMIRA PEN) PnKt injection (Order#[id number], Rx#[id number])          Refill Questions - Documented Responses    Flowsheet Row Most Recent Value   Patient Availability and HIPAA Verification    Does patient want to proceed with activity? Yes   HIPAA/medical authority confirmed? Yes   Relationship to patient of person spoken to? Child   Refill Screening Questions    Changes to allergies? No   Changes to medications? No   New conditions since last clinic visit? No   Unplanned office visit, urgent care, ED, or hospital admission in the last 4 weeks? No   How does patient/caregiver feel medication is working? Good   Financial problems or insurance changes? No   How many doses of your specialty medications were missed in the last 4 weeks? 0   Would patient like to speak to a pharmacist? No   When does the patient need to receive the medication? 08/18/22   Refill Delivery Questions    How will the patient receive the medication? Delivery Angie   When does the patient need to receive the medication? 08/18/22   Shipping Address Home   Address in [facility] confirmed and updated if neccessary? Yes   Expected Copay ($) 0   Is the patient able to afford the medication copay? Yes   Payment Method zero copay   Days supply of Refill 28   Supplies needed? No supplies needed   Refill activity completed? Yes   Refill activity plan Refill scheduled   Shipment/Pickup Date: 08/15/22          Current Outpatient Medications   Medication Sig    adalimumab (HUMIRA PEN) PnKt injection Inject 1 pen (40 mg total) into the skin every 14 (fourteen) days.    aspirin (ECOTRIN) 81 MG EC tablet Take 81 mg by mouth once daily.    azithromycin (Z-JOYCELYN) 250 MG tablet Take 1 tablet (250 mg total) by mouth once daily. Take first 2 tablets together, then 1 every day until finished.     budesonide (PULMICORT FLEXHALER) 90 mcg/actuation AePB Inhale 2 puffs (180 mcg total) into the lungs 2 (two) times daily. Controller (Patient not taking: Reported on 2/9/2021)    hydroCHLOROthiazide (HYDRODIURIL) 25 MG tablet Take 1 tablet by mouth once daily    hyoscyamine (ANASPAZ,LEVSIN) 0.125 mg Tab Take 1 tablet (125 mcg total) by mouth every 4 (four) hours as needed.    lisinopriL (PRINIVIL,ZESTRIL) 40 MG tablet TAKE 1 TABLET BY MOUTH ONCE DAILY IN THE EVENING    montelukast (SINGULAIR) 10 mg tablet Take 1 tablet (10 mg total) by mouth every evening. (Patient not taking: Reported on 4/5/2022)    multivitamin (THERAGRAN) per tablet Take 1 tablet by mouth once daily.    naproxen (NAPROSYN) 500 MG tablet TAKE 1 TABLET BY MOUTH TWICE DAILY WITH MEALS    TRELEGY ELLIPTA 200-62.5-25 mcg inhaler INHALE 1 PUFF ONCE DAILY    VENTOLIN HFA 90 mcg/actuation inhaler INHALE 2 PUFFS BY MOUTH EVERY 6 HOURS AS NEEDED FOR WHEEZING   Last reviewed on 4/5/2022  8:33 AM by John Benavides MD    Review of patient's allergies indicates:   Allergen Reactions    Influenza virus vaccines Other (See Comments)    Last reviewed on  4/23/2022 3:17 PM by Dana Moody      Tasks added this encounter   9/8/2022 - Refill Call (Auto Added)   Tasks due within next 3 months   No tasks due.     [facility]  [address] [place] [address]  Phone: [phone]  Fax: [phone]

## 2022-09-08 ENCOUNTER — PATIENT MESSAGE (OUTPATIENT)
Dept: PHARMACY | Facility: CLINIC | Age: 67
End: 2022-09-08
Payer: MEDICARE

## 2022-09-13 ENCOUNTER — SPECIALTY PHARMACY (OUTPATIENT)
Dept: PHARMACY | Facility: CLINIC | Age: 67
End: 2022-09-13
Payer: MEDICARE

## 2022-09-13 DIAGNOSIS — L40.9 PSORIASIS: Primary | ICD-10-CM

## 2022-09-13 NOTE — TELEPHONE ENCOUNTER
Incoming call from patient's son. Son stated that patient should be due for Humira injection towards the end of the week. Informed him [facility] would call to set up refill once we get refill approval from provider.

## 2022-09-15 NOTE — TELEPHONE ENCOUNTER
Incoming call from patient's son (Vishnu) for Humira refill. New Humira Rx still not received. Current Rx is out of refills. Dose if due tomorrow or Saturday.

## 2022-09-22 NOTE — TELEPHONE ENCOUNTER
Specialty Pharmacy - Refill Coordination    Specialty Medication Orders Linked to Encounter      Flowsheet Row Most Recent Value   Medication #1 adalimumab (HUMIRA PEN) PnKt injection (Order#[id number], Rx#[id number])            Refill Questions - Documented Responses      Flowsheet Row Most Recent Value   Patient Availability and HIPAA Verification    Does patient want to proceed with activity? Yes   HIPAA/medical authority confirmed? Yes   Relationship to patient of person spoken to? Self   Refill Screening Questions    Changes to allergies? No   Changes to medications? No   New conditions since last clinic visit? No   Unplanned office visit, urgent care, ED, or hospital admission in the last 4 weeks? No   How does patient/caregiver feel medication is working? Good   Financial problems or insurance changes? No   How many doses of your specialty medications were missed in the last 4 weeks? 0   Would patient like to speak to a pharmacist? No   When does the patient need to receive the medication? 09/23/22   Refill Delivery Questions    How will the patient receive the medication? Delivery Angie   When does the patient need to receive the medication? 09/23/22   Shipping Address Home   Address in [facility] confirmed and updated if neccessary? Yes   Expected Copay ($) 0   Is the patient able to afford the medication copay? Yes   Payment Method zero copay   Days supply of Refill 28   Supplies needed? No supplies needed   Refill activity completed? Yes   Refill activity plan Refill scheduled   Shipment/Pickup Date: 09/22/22            Current Outpatient Medications   Medication Sig    adalimumab (HUMIRA PEN) PnKt injection Inject 1 pen (40 mg total) into the skin every 14 (fourteen) days.    aspirin (ECOTRIN) 81 MG EC tablet Take 81 mg by mouth once daily.    azithromycin (Z-JOYCELYN) 250 MG tablet Take 1 tablet (250 mg total) by mouth once daily. Take first 2 tablets together, then 1 every day until finished.     budesonide (PULMICORT FLEXHALER) 90 mcg/actuation AePB Inhale 2 puffs (180 mcg total) into the lungs 2 (two) times daily. Controller (Patient not taking: Reported on 2/9/2021)    hydroCHLOROthiazide (HYDRODIURIL) 25 MG tablet Take 1 tablet by mouth once daily    hyoscyamine (ANASPAZ,LEVSIN) 0.125 mg Tab Take 1 tablet (125 mcg total) by mouth every 4 (four) hours as needed.    lisinopriL (PRINIVIL,ZESTRIL) 40 MG tablet TAKE 1 TABLET BY MOUTH ONCE DAILY IN THE EVENING    montelukast (SINGULAIR) 10 mg tablet Take 1 tablet (10 mg total) by mouth every evening. (Patient not taking: Reported on 4/5/2022)    multivitamin (THERAGRAN) per tablet Take 1 tablet by mouth once daily.    naproxen (NAPROSYN) 500 MG tablet TAKE 1 TABLET BY MOUTH TWICE DAILY WITH MEALS    TRELEGY ELLIPTA 200-62.5-25 mcg inhaler INHALE 1 PUFF ONCE DAILY    VENTOLIN HFA 90 mcg/actuation inhaler INHALE 2 PUFFS BY MOUTH EVERY 6 HOURS AS NEEDED FOR WHEEZING   Last reviewed on 4/5/2022  8:33 AM by John Benavides MD    Review of patient's allergies indicates:   Allergen Reactions    Influenza virus vaccines Other (See Comments)    Last reviewed on  9/20/2022 7:43 AM by John Benavides      Tasks added this encounter   10/14/2022 - Refill Call (Auto Added)   Tasks due within next 3 months   No tasks due.     Belkis Gage, PharmD  [facility]  [address] [place] [address]  Phone: [phone]  Fax: [phone]

## 2022-09-24 PROBLEM — I10 ESSENTIAL HYPERTENSION: Chronic | Status: ACTIVE | Noted: 2017-09-21

## 2022-09-24 PROBLEM — R00.1 SYMPTOMATIC BRADYCARDIA: Status: ACTIVE | Noted: 2022-09-24

## 2022-10-14 ENCOUNTER — SPECIALTY PHARMACY (OUTPATIENT)
Dept: PHARMACY | Facility: CLINIC | Age: 67
End: 2022-10-14
Payer: MEDICARE

## 2022-10-14 NOTE — TELEPHONE ENCOUNTER
Specialty Pharmacy - Refill Coordination    Specialty Medication Orders Linked to Encounter      Flowsheet Row Most Recent Value   Medication #1 adalimumab (HUMIRA PEN) PnKt injection (Order#[id number], Rx#[id number])        Pt's son stated that pt went to hospital recently due to bradycardia, HTN, and is experiencing dizziness. Pt has seen the cardiologist and turned in his 14 day heart monitor today. Pt's son stated pt stopped taking hydrochlorothiazide. Pt has follow up w cardiologist next week. Joaquim Naidu stated that pt followed up with derm provider and they're aware and to proceed with refill.     Refill Questions - Documented Responses      Flowsheet Row Most Recent Value   Patient Availability and HIPAA Verification    Does patient want to proceed with activity? Yes   HIPAA/medical authority confirmed? Yes   Relationship to patient of person spoken to? Child   Refill Screening Questions    Changes to allergies? No   Changes to medications? No   New conditions since last clinic visit? No   Unplanned office visit, urgent care, ED, or hospital admission in the last 4 weeks? No   How does patient/caregiver feel medication is working? Good   Financial problems or insurance changes? No   How many doses of your specialty medications were missed in the last 4 weeks? 0   Would patient like to speak to a pharmacist? No   When does the patient need to receive the medication? 10/21/22   Refill Delivery Questions    How will the patient receive the medication? MEDRx   When does the patient need to receive the medication? 10/21/22   Shipping Address Home   Address in [facility] confirmed and updated if neccessary? Yes   Expected Copay ($) 0   Is the patient able to afford the medication copay? Yes   Payment Method zero copay   Days supply of Refill 28   Supplies needed? No supplies needed   Refill activity completed? Yes   Refill activity plan Refill scheduled   Shipment/Pickup Date: 10/18/22            Current  Outpatient Medications   Medication Sig    adalimumab (HUMIRA PEN) PnKt injection Inject 1 pen (40 mg total) into the skin every 14 (fourteen) days.    aspirin (ECOTRIN) 81 MG EC tablet Take 81 mg by mouth once daily.    lisinopriL (PRINIVIL,ZESTRIL) 40 MG tablet TAKE 1 TABLET BY MOUTH ONCE DAILY IN THE EVENING    multivitamin (THERAGRAN) per tablet Take 1 tablet by mouth once daily.    TRELEGY ELLIPTA 200-62.5-25 mcg inhaler INHALE 1 PUFF ONCE DAILY    VENTOLIN HFA 90 mcg/actuation inhaler INHALE 2 PUFFS BY MOUTH EVERY 6 HOURS AS NEEDED FOR WHEEZING   Last reviewed on 10/4/2022  8:21 AM by Esther Toribio MA    Review of patient's allergies indicates:   Allergen Reactions    Influenza virus vaccines Other (See Comments)    Last reviewed on  10/4/2022 8:21 AM by Esther Toribio      Tasks added this encounter   11/11/2022 - Refill Call (Auto Added)   Tasks due within next 3 months   No tasks due.     Aarti Dorsey, Patient Care Assistant  [facility]  [facility] [address]  [facility] [address]  Phone: [phone]  Fax: [phone]

## 2022-11-11 ENCOUNTER — SPECIALTY PHARMACY (OUTPATIENT)
Dept: PHARMACY | Facility: CLINIC | Age: 67
End: 2022-11-11
Payer: MEDICARE

## 2022-11-11 NOTE — TELEPHONE ENCOUNTER
Outgoing call regarding humira refill; per pt's son, he was unsure of when his dad last injected; he informed that he would reach out to his dad to find out, and then give us a call back; gave him the refill number to call

## 2022-11-14 NOTE — TELEPHONE ENCOUNTER
Specialty Pharmacy - Refill Coordination    Specialty Medication Orders Linked to Encounter      Flowsheet Row Most Recent Value   Medication #1 adalimumab (HUMIRA PEN) PnKt injection (Order#[id number], Rx#[id number])            Refill Questions - Documented Responses      Flowsheet Row Most Recent Value   Patient Availability and HIPAA Verification    Does patient want to proceed with activity? Yes   HIPAA/medical authority confirmed? Yes   Relationship to patient of person spoken to? Child   Refill Screening Questions    Changes to allergies? No   Changes to medications? No   New conditions since last clinic visit? No   Unplanned office visit, urgent care, ED, or hospital admission in the last 4 weeks? No   How does patient/caregiver feel medication is working? Good   Financial problems or insurance changes? No   How many doses of your specialty medications were missed in the last 4 weeks? 0   Would patient like to speak to a pharmacist? No   When does the patient need to receive the medication? 11/19/22   Refill Delivery Questions    How will the patient receive the medication? MEDRx   When does the patient need to receive the medication? 11/19/22   Shipping Address Home   Address in [facility] confirmed and updated if neccessary? Yes   Expected Copay ($) 0   Is the patient able to afford the medication copay? Yes   Payment Method zero copay   Days supply of Refill 28   Supplies needed? No supplies needed   Refill activity completed? Yes   Refill activity plan Refill scheduled   Shipment/Pickup Date: 11/15/22            Current Outpatient Medications   Medication Sig    adalimumab (HUMIRA PEN) PnKt injection Inject 1 pen (40 mg total) into the skin every 14 (fourteen) days.    aspirin (ECOTRIN) 81 MG EC tablet Take 81 mg by mouth once daily.    lisinopriL (PRINIVIL,ZESTRIL) 40 MG tablet TAKE 1 TABLET BY MOUTH ONCE DAILY IN THE EVENING    multivitamin (THERAGRAN) per tablet Take 1 tablet by mouth once  daily.    TRELEGY ELLIPTA 200-62.5-25 mcg inhaler INHALE 1 PUFF ONCE DAILY    VENTOLIN HFA 90 mcg/actuation inhaler INHALE 2 PUFFS BY MOUTH EVERY 6 HOURS AS NEEDED FOR WHEEZING   Last reviewed on 10/4/2022  8:21 AM by Esther Toribio MA    Review of patient's allergies indicates:   Allergen Reactions    Influenza virus vaccines Other (See Comments)    Last reviewed on  10/4/2022 8:21 AM by Esther Toribio      Tasks added this encounter   12/10/2022 - Refill Call (Auto Added)   Tasks due within next 3 months   No tasks due.     [facility]  [address] [facility] [address]  Phone: [phone]  Fax: [phone]

## 2022-12-12 ENCOUNTER — SPECIALTY PHARMACY (OUTPATIENT)
Dept: PHARMACY | Facility: CLINIC | Age: 67
End: 2022-12-12
Payer: MEDICARE

## 2022-12-12 NOTE — TELEPHONE ENCOUNTER
Specialty Pharmacy - Refill Coordination    Specialty Medication Orders Linked to Encounter      Flowsheet Row Most Recent Value   Medication #1 adalimumab (HUMIRA PEN) PnKt injection (Order#[id number], Rx#[id number])            Refill Questions - Documented Responses      Flowsheet Row Most Recent Value   Patient Availability and HIPAA Verification    Does patient want to proceed with activity? Yes   HIPAA/medical authority confirmed? Yes   Relationship to patient of person spoken to? Child   Refill Screening Questions    Changes to allergies? No   Changes to medications? No   New conditions since last clinic visit? No   Unplanned office visit, urgent care, ED, or hospital admission in the last 4 weeks? No   How does patient/caregiver feel medication is working? Good   Financial problems or insurance changes? No   How many doses of your specialty medications were missed in the last 4 weeks? 0   Would patient like to speak to a pharmacist? No   When does the patient need to receive the medication? 12/21/22   Refill Delivery Questions    How will the patient receive the medication? MEDRx   When does the patient need to receive the medication? 12/21/22   Address in [facility] confirmed and updated if neccessary? Yes   Expected Copay ($) 0   Is the patient able to afford the medication copay? Yes   Payment Method zero copay   Days supply of Refill 28   Supplies needed? No supplies needed   Refill activity completed? Yes   Refill activity plan Refill scheduled   Shipment/Pickup Date: 12/19/22            Current Outpatient Medications   Medication Sig    adalimumab (HUMIRA PEN) PnKt injection Inject 1 pen (40 mg total) into the skin every 14 (fourteen) days.    aspirin (ECOTRIN) 81 MG EC tablet Take 81 mg by mouth once daily.    lisinopriL (PRINIVIL,ZESTRIL) 40 MG tablet TAKE 1 TABLET BY MOUTH ONCE DAILY IN THE EVENING    multivitamin (THERAGRAN) per tablet Take 1 tablet by mouth once daily.    TRELEGY ELLIPTA  200-62.5-25 mcg inhaler INHALE 1 PUFF ONCE DAILY    VENTOLIN HFA 90 mcg/actuation inhaler INHALE 2 PUFFS BY MOUTH EVERY 6 HOURS AS NEEDED FOR WHEEZING   Last reviewed on 10/4/2022  8:21 AM by Esther Toribio MA    Review of patient's allergies indicates:   Allergen Reactions    Influenza virus vaccines Other (See Comments)    Last reviewed on  10/4/2022 8:21 AM by Esther Toribio      Tasks added this encounter   1/11/2023 - Refill Call (Auto Added)   Tasks due within next 3 months   3/10/2023 - Clinical - Follow Up Assesement (Annual)     [facility]  [address]  [facility] [address]  Phone: [phone]  Fax: [phone]

## 2023-01-11 ENCOUNTER — SPECIALTY PHARMACY (OUTPATIENT)
Dept: PHARMACY | Facility: CLINIC | Age: 68
End: 2023-01-11
Payer: MEDICARE

## 2023-01-11 NOTE — TELEPHONE ENCOUNTER
Specialty Pharmacy - Refill Coordination    Specialty Medication Orders Linked to Encounter      Flowsheet Row Most Recent Value   Medication #1 adalimumab (HUMIRA PEN) PnKt injection (Order#[id number], Rx#[id number])       Refill Questions - Documented Responses      Flowsheet Row Most Recent Value   Patient Availability and HIPAA Verification    Does patient want to proceed with activity? Yes   HIPAA/medical authority confirmed? Yes   Relationship to patient of person spoken to? Child   Refill Screening Questions    Changes to allergies? No   Changes to medications? No   New conditions since last clinic visit? No   Unplanned office visit, urgent care, ED, or hospital admission in the last 4 weeks? No   How does patient/caregiver feel medication is working? Good   Financial problems or insurance changes? No   How many doses of your specialty medications were missed in the last 4 weeks? 0   Would patient like to speak to a pharmacist? No   When does the patient need to receive the medication? 01/22/23   Refill Delivery Questions    How will the patient receive the medication? MEDRx   When does the patient need to receive the medication? 01/22/23   Shipping Address Home   Address in [facility] confirmed and updated if neccessary? Yes   Expected Copay ($) 10.35   Is the patient able to afford the medication copay? Yes   Payment Method --  [pt will call back with new [facility]]   Days supply of Refill 28   Supplies needed? No supplies needed   Refill activity completed? Yes   Refill activity plan Refill scheduled   Shipment/Pickup Date: 01/17/23            Current Outpatient Medications   Medication Sig    adalimumab (HUMIRA PEN) PnKt injection Inject 1 pen (40 mg total) into the skin every 14 (fourteen) days.    aspirin (ECOTRIN) 81 MG EC tablet Take 81 mg by mouth once daily.    lisinopriL (PRINIVIL,ZESTRIL) 40 MG tablet TAKE 1 TABLET BY MOUTH ONCE DAILY IN THE EVENING    multivitamin (THERAGRAN) per tablet Take 1  tablet by mouth once daily.    TRELEGY ELLIPTA 200-62.5-25 mcg inhaler INHALE 1 PUFF ONCE DAILY    VENTOLIN HFA 90 mcg/actuation inhaler INHALE 2 PUFFS BY MOUTH EVERY 6 HOURS AS NEEDED FOR WHEEZING   Last reviewed on 10/4/2022  8:21 AM by Esther Toribio MA    Review of patient's allergies indicates:   Allergen Reactions    Influenza virus vaccines Other (See Comments)    Last reviewed on  10/4/2022 8:21 AM by Esther Toribio      Tasks added this encounter   2/12/2023 - Refill Call (Auto Added)   Tasks due within next 3 months   3/10/2023 - Clinical - Follow Up Assesement (Annual)     Heather Moore, PharmD  [facility]  [facility] [address]  [facility] [address]  Phone: [phone]  Fax: [phone]

## 2023-01-17 ENCOUNTER — TELEPHONE (OUTPATIENT)
Dept: PHARMACY | Facility: CLINIC | Age: 68
End: 2023-01-17
Payer: MEDICARE

## 2023-01-17 NOTE — TELEPHONE ENCOUNTER
Incoming call from pts son returning call to [facility]. Seems like FF called about [facility]. Provided card to bill balance for.

## 2023-02-13 ENCOUNTER — PATIENT MESSAGE (OUTPATIENT)
Dept: PHARMACY | Facility: CLINIC | Age: 68
End: 2023-02-13
Payer: MEDICARE

## 2023-02-22 ENCOUNTER — SPECIALTY PHARMACY (OUTPATIENT)
Dept: PHARMACY | Facility: CLINIC | Age: 68
End: 2023-02-22
Payer: MEDICARE

## 2023-02-22 NOTE — TELEPHONE ENCOUNTER
Specialty Pharmacy - Refill Coordination    Specialty Medication Orders Linked to Encounter      Flowsheet Row Most Recent Value   Medication #1 adalimumab (HUMIRA PEN) PnKt injection (Order#[id number], Rx#[id number])            Refill Questions - Documented Responses      Flowsheet Row Most Recent Value   Patient Availability and HIPAA Verification    Does patient want to proceed with activity? Yes   HIPAA/medical authority confirmed? Yes   Relationship to patient of person spoken to? Child   Refill Screening Questions    Changes to allergies? No   Changes to medications? No   New conditions since last clinic visit? No   Unplanned office visit, urgent care, ED, or hospital admission in the last 4 weeks? No   How does patient/caregiver feel medication is working? Good   Financial problems or insurance changes? No   How many doses of your specialty medications were missed in the last 4 weeks? 0   Would patient like to speak to a pharmacist? No   When does the patient need to receive the medication? 02/25/23   Refill Delivery Questions    How will the patient receive the medication? MEDRx   When does the patient need to receive the medication? 02/25/23   Shipping Address Home   Address in [facility] confirmed and updated if neccessary? Yes   Expected Copay ($) 10.35   Is the patient able to afford the medication copay? Yes   Payment Method CC on file   Days supply of Refill 28   Supplies needed? No supplies needed   Refill activity completed? Yes   Refill activity plan Refill scheduled   Shipment/Pickup Date: 02/23/23            Current Outpatient Medications   Medication Sig    adalimumab (HUMIRA PEN) PnKt injection Inject 1 pen (40 mg total) into the skin every 14 (fourteen) days.    aspirin (ECOTRIN) 81 MG EC tablet Take 81 mg by mouth once daily.    lisinopriL (PRINIVIL,ZESTRIL) 40 MG tablet TAKE 1 TABLET BY MOUTH ONCE DAILY IN THE EVENING    multivitamin (THERAGRAN) per tablet Take 1 tablet by mouth once  daily.    TRELEZOEY ELLIPTA 200-62.5-25 mcg inhaler INHALE 1 PUFF ONCE DAILY    VENTOLIN HFA 90 mcg/actuation inhaler INHALE 2 PUFFS BY MOUTH EVERY 6 HOURS AS NEEDED FOR WHEEZING   Last reviewed on 10/4/2022  8:21 AM by Esther Toribio MA    Review of patient's allergies indicates:   Allergen Reactions    Influenza virus vaccines Other (See Comments)    Last reviewed on  10/4/2022 8:21 AM by Esther Toribio      Tasks added this encounter   3/18/2023 - Refill Call (Auto Added)   Tasks due within next 3 months   3/10/2023 - Clinical - Follow Up Assesement (Annual)     Isamar Pena, PharmD  [facility]  [facility] [address]  [facility] [address]  Phone: [phone]  Fax: [phone]

## 2023-02-22 NOTE — TELEPHONE ENCOUNTER
Incoming call from pt's son regarding Humira refill. Informed pt that we have been trying to reach out to him since 2/12. Pt's son stated that pt said he was due sometime next week. That date does match the dates of last refill and when [facility] was due to call pt. Pt's son said he was going to call pt to confirm dates and call [facility] back.

## 2023-03-28 ENCOUNTER — SPECIALTY PHARMACY (OUTPATIENT)
Dept: PHARMACY | Facility: CLINIC | Age: 68
End: 2023-03-28
Payer: MEDICARE

## 2023-03-28 DIAGNOSIS — L40.9 PSORIASIS: Primary | ICD-10-CM

## 2023-03-28 NOTE — TELEPHONE ENCOUNTER
Specialty Pharmacy - Refill Coordination  Specialty Pharmacy - Clinical Reassessment    Specialty Medication Orders Linked to Encounter      Flowsheet Row Most Recent Value   Medication #1 adalimumab (HUMIRA PEN) PnKt injection (Order#[id number], Rx#[id number])          Patient Diagnosis   L40.9 - Psoriasis    Tan Gilliam is a 67 y.o. male, who is followed by the specialty pharmacy service for management and education of his Humira.  He has been on therapy with Humira for 52 months.  I have reviewed his electronic medical record and current medication list and determined that specialty medication adjustment Is not needed at this time.    Patient has not experienced adverse events.  He Is adherent reporting 0 missed doses since last review.  Adherence has been encouraged with the following mechanism(s): Proactive refill calls.  He is meeting goals of therapy and will continue treatment.        3/28/2023 2/22/2023 1/11/2023 12/12/2022 11/14/2022 10/14/2022 9/22/2022   Follow Up Review   # of missed doses 0 0 0 0 0 0 0   New Medications? No No No No No No No   New Conditions? No No No No No No No   New Allergies? No No No No No No No   Med Effective? Excellent Good Good Good Good Good Good   Urgent Care? No No No No No No No   Requested Pharmacist? No No No No No No No            Therapy is appropriate to continue.    Therapy is effective: Yes  On scale of 1 to 10, how does patient rank quality of life? (10 - Best): 10  Recommendations: none at this time.  Review Method: Patient Contact    Tasks added this encounter   4/22/2023 - Refill Call (Auto Added)   Tasks due within next 3 months   3/10/2023 - Clinical - Follow Up Assesement (Annual)     [facility]  [address] [place] [address]  Phone: [phone]  Fax: [phone]

## 2023-03-28 NOTE — TELEPHONE ENCOUNTER
Specialty Pharmacy - Refill Coordination  Specialty Pharmacy - Clinical Reassessment    Specialty Medication Orders Linked to Encounter      Flowsheet Row Most Recent Value   Medication #1 adalimumab (HUMIRA PEN) PnKt injection (Order#[id number], Rx#[id number])            Refill Questions - Documented Responses      Flowsheet Row Most Recent Value   Patient Availability and HIPAA Verification    Does patient want to proceed with activity? Yes   HIPAA/medical authority confirmed? Yes   Relationship to patient of person spoken to? Self   Refill Screening Questions    Changes to allergies? No   Changes to medications? No   New conditions since last clinic visit? No   Unplanned office visit, urgent care, ED, or hospital admission in the last 4 weeks? No   How does patient/caregiver feel medication is working? Excellent   Financial problems or insurance changes? No   How many doses of your specialty medications were missed in the last 4 weeks? 0   Would patient like to speak to a pharmacist? No   When does the patient need to receive the medication? 04/01/23   Refill Delivery Questions    How will the patient receive the medication? MEDRx   When does the patient need to receive the medication? 04/01/23   Shipping Address Home   Address in [facility] confirmed and updated if neccessary? Yes   Expected Copay ($) 0   Is the patient able to afford the medication copay? Yes   Payment Method zero copay   Days supply of Refill 28   Supplies needed? No supplies needed   Refill activity completed? Yes   Refill activity plan Refill scheduled   Shipment/Pickup Date: 03/30/23            Current Outpatient Medications   Medication Sig    adalimumab (HUMIRA PEN) PnKt injection Inject 1 pen (40 mg total) into the skin every 14 (fourteen) days.    adalimumab (HUMIRA PEN) PnKt injection as directed Subcutaneous    albuterol (PROVENTIL/VENTOLIN HFA) 90 mcg/actuation inhaler 2 puff as needed Inhalation every 6 hrs for 30 days     amLODIPine (NORVASC) 5 MG tablet Take 5 mg by mouth.    ammonium lactate (LAC-HYDRIN) 12 % lotion Apply topically 2 (two) times daily.    aspirin (ECOTRIN) 81 MG EC tablet Take 81 mg by mouth once daily.    cyclobenzaprine (FLEXERIL) 5 MG tablet TAKE 1 TABLET BY MOUTH THREE TIMES A DAY Orally tid prn spasm for 30 day(s)    hydroCHLOROthiazide (HYDRODIURIL) 25 MG tablet 1 tablet in the morning Orally Once a day for 90 days    lisinopriL (PRINIVIL,ZESTRIL) 40 MG tablet TAKE 1 TABLET BY MOUTH ONCE DAILY IN THE EVENING    metoprolol tartrate (LOPRESSOR) 25 MG tablet Take 25 mg by mouth.    multivitamin (THERAGRAN) per tablet Take 1 tablet by mouth once daily.    naproxen (NAPROSYN) 500 MG tablet TAKE 1 TABLET BY MOUTH EVERY 12 HOURS WITH FOOD OR MILK AS NEEDED for 30    TRELEGY ELLIPTA 200-62.5-25 mcg inhaler INHALE 1 PUFF ONCE DAILY    VENTOLIN HFA 90 mcg/actuation inhaler INHALE 2 PUFFS BY MOUTH EVERY 6 HOURS AS NEEDED FOR WHEEZING   Last reviewed on 3/28/2023  8:45 AM by John Benavides MD    Review of patient's allergies indicates:   Allergen Reactions    Influenza virus vaccines Other (See Comments)    Last reviewed on  3/28/2023 8:45 AM by John Benavides      Tasks added this encounter   No tasks added.   Tasks due within next 3 months   3/10/2023 - Clinical - Follow Up Assesement (Annual)  3/18/2023 - Refill Call (Auto Added)     [facility]  [address] [place] [address]  Phone: [phone]  Fax: [phone]

## 2023-04-25 ENCOUNTER — SPECIALTY PHARMACY (OUTPATIENT)
Dept: PHARMACY | Facility: CLINIC | Age: 68
End: 2023-04-25
Payer: MEDICARE

## 2023-04-25 NOTE — TELEPHONE ENCOUNTER
Specialty Pharmacy - Refill Coordination    Specialty Medication Orders Linked to Encounter      Flowsheet Row Most Recent Value   Medication #1 adalimumab (HUMIRA PEN) PnKt injection (Order#[id number], Rx#[id number])            Refill Questions - Documented Responses      Flowsheet Row Most Recent Value   Refill Screening Questions    Changes to allergies? No   Changes to medications? No   New conditions since last clinic visit? No   Unplanned office visit, urgent care, ED, or hospital admission in the last 4 weeks? No   How does patient/caregiver feel medication is working? Good   Financial problems or insurance changes? No   How many doses of your specialty medications were missed in the last 4 weeks? 0   Would patient like to speak to a pharmacist? No   When does the patient need to receive the medication? 04/29/23   Refill Delivery Questions    How will the patient receive the medication? MEDRx   When does the patient need to receive the medication? 04/29/23   Shipping Address Home   Address in [facility] confirmed and updated if neccessary? Yes   Expected Copay ($) 0   Is the patient able to afford the medication copay? Yes   Payment Method zero copay   Days supply of Refill 28   Supplies needed? No supplies needed   Refill activity completed? Yes   Refill activity plan Refill scheduled   Shipment/Pickup Date: 04/26/23            Current Outpatient Medications   Medication Sig    adalimumab (HUMIRA PEN) PnKt injection Inject 1 pen (40 mg total) into the skin every 14 (fourteen) days.    adalimumab (HUMIRA PEN) PnKt injection as directed Subcutaneous    albuterol (PROVENTIL/VENTOLIN HFA) 90 mcg/actuation inhaler 2 puff as needed Inhalation every 6 hrs for 30 days    amLODIPine (NORVASC) 5 MG tablet Take 5 mg by mouth.    ammonium lactate (LAC-HYDRIN) 12 % lotion Apply topically 2 (two) times daily.    aspirin (ECOTRIN) 81 MG EC tablet Take 81 mg by mouth once daily.    cyclobenzaprine (FLEXERIL) 5 MG  tablet TAKE 1 TABLET BY MOUTH THREE TIMES A DAY Orally tid prn spasm for 30 day(s)    hydroCHLOROthiazide (HYDRODIURIL) 25 MG tablet 1 tablet in the morning Orally Once a day for 90 days    lisinopriL (PRINIVIL,ZESTRIL) 40 MG tablet TAKE 1 TABLET BY MOUTH ONCE DAILY IN THE EVENING    metoprolol tartrate (LOPRESSOR) 25 MG tablet Take 25 mg by mouth.    multivitamin (THERAGRAN) per tablet Take 1 tablet by mouth once daily.    naproxen (NAPROSYN) 500 MG tablet TAKE 1 TABLET BY MOUTH EVERY 12 HOURS WITH FOOD OR MILK AS NEEDED for 30    TRELEGY ELLIPTA 200-62.5-25 mcg inhaler INHALE 1 PUFF ONCE DAILY    VENTOLIN HFA 90 mcg/actuation inhaler INHALE 2 PUFFS BY MOUTH EVERY 6 HOURS AS NEEDED FOR WHEEZING   Last reviewed on 3/28/2023 11:56 AM by Stan Wilkinson, PharmD    Review of patient's allergies indicates:   Allergen Reactions    Influenza virus vaccines Other (See Comments)    Last reviewed on  3/28/2023 11:56 AM by Stan Wilkinson      Tasks added this encounter   No tasks added.   Tasks due within next 3 months   4/28/2023 - Refill Coordination Outreach (1 time occurrence)     Holli Menchaca, PharmD  [facility]  [address] [place] [id number]  Phone: [phone]  Fax: [phone]

## 2023-05-17 ENCOUNTER — SPECIALTY PHARMACY (OUTPATIENT)
Dept: PHARMACY | Facility: CLINIC | Age: 68
End: 2023-05-17
Payer: MEDICARE

## 2023-05-17 NOTE — TELEPHONE ENCOUNTER
Outgoing call regarding humira refill; per pt's son, he's due to inject on 5/30; informed him that [facility] will follow up on 5/22 to schedule delivery

## 2023-05-22 NOTE — TELEPHONE ENCOUNTER
Specialty Pharmacy - Refill Coordination    Specialty Medication Orders Linked to Encounter      Flowsheet Row Most Recent Value   Medication #1 adalimumab (HUMIRA PEN) PnKt injection (Order#[id number], Rx#[id number])            Refill Questions - Documented Responses      Flowsheet Row Most Recent Value   Patient Availability and HIPAA Verification    Does patient want to proceed with activity? Yes   HIPAA/medical authority confirmed? Yes   Relationship to patient of person spoken to? Child   Refill Screening Questions    Changes to allergies? No   Changes to medications? No   New conditions since last clinic visit? No   Unplanned office visit, urgent care, ED, or hospital admission in the last 4 weeks? No   How does patient/caregiver feel medication is working? Good   Financial problems or insurance changes? No   How many doses of your specialty medications were missed in the last 4 weeks? 0   Would patient like to speak to a pharmacist? No   When does the patient need to receive the medication? 05/30/23   Refill Delivery Questions    How will the patient receive the medication? MEDRx   When does the patient need to receive the medication? 05/30/23   Shipping Address Home   Address in [facility] confirmed and updated if neccessary? Yes   Expected Copay ($) 0   Is the patient able to afford the medication copay? Yes   Payment Method zero copay   Days supply of Refill 28   Supplies needed? No supplies needed   Refill activity completed? Yes   Refill activity plan Refill scheduled   Shipment/Pickup Date: 05/29/23            Current Outpatient Medications   Medication Sig    adalimumab (HUMIRA PEN) PnKt injection Inject 1 pen (40 mg total) into the skin every 14 (fourteen) days.    adalimumab (HUMIRA PEN) PnKt injection as directed Subcutaneous    albuterol (PROVENTIL/VENTOLIN HFA) 90 mcg/actuation inhaler 2 puff as needed Inhalation every 6 hrs for 30 days    amLODIPine (NORVASC) 5 MG tablet Take 5 mg by  mouth.    ammonium lactate (LAC-HYDRIN) 12 % lotion Apply topically 2 (two) times daily.    aspirin (ECOTRIN) 81 MG EC tablet Take 81 mg by mouth once daily.    cyclobenzaprine (FLEXERIL) 5 MG tablet TAKE 1 TABLET BY MOUTH THREE TIMES A DAY Orally tid prn spasm for 30 day(s)    hydroCHLOROthiazide (HYDRODIURIL) 25 MG tablet 1 tablet in the morning Orally Once a day for 90 days    lisinopriL (PRINIVIL,ZESTRIL) 40 MG tablet TAKE 1 TABLET BY MOUTH ONCE DAILY IN THE EVENING    metoprolol tartrate (LOPRESSOR) 25 MG tablet Take 25 mg by mouth.    multivitamin (THERAGRAN) per tablet Take 1 tablet by mouth once daily.    naproxen (NAPROSYN) 500 MG tablet TAKE 1 TABLET BY MOUTH EVERY 12 HOURS WITH FOOD OR MILK AS NEEDED for 30    TRELEGY ELLIPTA 200-62.5-25 mcg inhaler INHALE 1 PUFF ONCE DAILY    VENTOLIN HFA 90 mcg/actuation inhaler INHALE 2 PUFFS BY MOUTH EVERY 6 HOURS AS NEEDED FOR WHEEZING   Last reviewed on 3/28/2023 11:56 AM by Stan Wilkinson, PharmD    Review of patient's allergies indicates:   Allergen Reactions    Influenza virus vaccines Other (See Comments)    Last reviewed on  3/28/2023 11:56 AM by Stan Wilkinson      Tasks added this encounter   No tasks added.   Tasks due within next 3 months   5/22/2023 - Refill Coordination Outreach (1 time occurrence)     [facility]  [address] [place] [id number]  Phone: [phone]  Fax: [phone]

## 2023-06-19 ENCOUNTER — SPECIALTY PHARMACY (OUTPATIENT)
Dept: PHARMACY | Facility: CLINIC | Age: 68
End: 2023-06-19
Payer: MEDICARE

## 2023-06-19 NOTE — TELEPHONE ENCOUNTER
Outgoing call regarding refill for Humira. Pt is due on 6/30. Informed [facility] will follow up on 6/23 to set up refill. Pt verbalize no late or missed doses.

## 2023-06-23 ENCOUNTER — PATIENT MESSAGE (OUTPATIENT)
Dept: PHARMACY | Facility: CLINIC | Age: 68
End: 2023-06-23
Payer: MEDICARE

## 2023-06-23 NOTE — TELEPHONE ENCOUNTER
Specialty Pharmacy - Refill Coordination    Specialty Medication Orders Linked to Encounter      Flowsheet Row Most Recent Value   Medication #1 adalimumab (HUMIRA PEN) PnKt injection (Order#[id number], Rx#[id number])            Refill Questions - Documented Responses      Flowsheet Row Most Recent Value   Patient Availability and HIPAA Verification    Does patient want to proceed with activity? Yes   HIPAA/medical authority confirmed? Yes   Relationship to patient of person spoken to? Child   Refill Screening Questions    Changes to allergies? No   Changes to medications? No   New conditions since last clinic visit? No   Unplanned office visit, urgent care, ED, or hospital admission in the last 4 weeks? No   How does patient/caregiver feel medication is working? Good   Financial problems or insurance changes? No   How many doses of your specialty medications were missed in the last 4 weeks? 0   Would patient like to speak to a pharmacist? No   When does the patient need to receive the medication? 07/01/23   Refill Delivery Questions    How will the patient receive the medication? MEDRx   When does the patient need to receive the medication? 07/01/23   Shipping Address Home   Address in [facility] confirmed and updated if neccessary? Yes   Expected Copay ($) 0   Is the patient able to afford the medication copay? Yes   Payment Method zero copay   Days supply of Refill 28   Supplies needed? No supplies needed   Refill activity completed? Yes   Refill activity plan Refill scheduled   Shipment/Pickup Date: 06/28/23            Current Outpatient Medications   Medication Sig    adalimumab (HUMIRA PEN) PnKt injection Inject 1 pen (40 mg total) into the skin every 14 (fourteen) days.    adalimumab (HUMIRA PEN) PnKt injection as directed Subcutaneous    albuterol (PROVENTIL/VENTOLIN HFA) 90 mcg/actuation inhaler 2 puff as needed Inhalation every 6 hrs for 30 days    amLODIPine (NORVASC) 5 MG tablet Take 5 mg by  mouth.    ammonium lactate (LAC-HYDRIN) 12 % lotion Apply topically 2 (two) times daily.    aspirin (ECOTRIN) 81 MG EC tablet Take 81 mg by mouth once daily.    cyclobenzaprine (FLEXERIL) 5 MG tablet TAKE 1 TABLET BY MOUTH THREE TIMES A DAY Orally tid prn spasm for 30 day(s)    hydroCHLOROthiazide (HYDRODIURIL) 25 MG tablet 1 tablet in the morning Orally Once a day for 90 days    lisinopriL (PRINIVIL,ZESTRIL) 40 MG tablet TAKE 1 TABLET BY MOUTH ONCE DAILY IN THE EVENING    metoprolol tartrate (LOPRESSOR) 25 MG tablet Take 25 mg by mouth.    multivitamin (THERAGRAN) per tablet Take 1 tablet by mouth once daily.    naproxen (NAPROSYN) 500 MG tablet TAKE 1 TABLET BY MOUTH EVERY 12 HOURS WITH FOOD OR MILK AS NEEDED for 30    TRELEGY ELLIPTA 200-62.5-25 mcg inhaler INHALE 1 PUFF ONCE DAILY    VENTOLIN HFA 90 mcg/actuation inhaler INHALE 2 PUFFS BY MOUTH EVERY 6 HOURS AS NEEDED FOR WHEEZING   Last reviewed on 3/28/2023 11:56 AM by Stan Wilkinson, PharmD    Review of patient's allergies indicates:   Allergen Reactions    Influenza virus vaccines Other (See Comments)    Last reviewed on  3/28/2023 11:56 AM by Stan Wilkinson      Tasks added this encounter   No tasks added.   Tasks due within next 3 months   No tasks due.     Regina Rdz, PharmD  [facility]  [address] [place] [id number]  Phone: [phone]  Fax: [phone]

## 2023-07-24 ENCOUNTER — PATIENT MESSAGE (OUTPATIENT)
Dept: PHARMACY | Facility: CLINIC | Age: 68
End: 2023-07-24
Payer: MEDICARE

## 2023-07-25 ENCOUNTER — SPECIALTY PHARMACY (OUTPATIENT)
Dept: PHARMACY | Facility: CLINIC | Age: 68
End: 2023-07-25
Payer: MEDICARE

## 2023-07-25 NOTE — TELEPHONE ENCOUNTER
Specialty Pharmacy - Refill Coordination    Specialty Medication Orders Linked to Encounter      Flowsheet Row Most Recent Value   Medication #1 adalimumab (HUMIRA PEN) PnKt injection (Order#[id number], Rx#[id number])            Refill Questions - Documented Responses      Flowsheet Row Most Recent Value   Patient Availability and HIPAA Verification    Does patient want to proceed with activity? Yes   HIPAA/medical authority confirmed? Yes   Relationship to patient of person spoken to? Child   Refill Screening Questions    Changes to allergies? No   Changes to medications? No   New conditions since last clinic visit? No   Unplanned office visit, urgent care, ED, or hospital admission in the last 4 weeks? No   How does patient/caregiver feel medication is working? Very good   Financial problems or insurance changes? No   How many doses of your specialty medications were missed in the last 4 weeks? 0   Would patient like to speak to a pharmacist? No   When does the patient need to receive the medication? 07/29/23   Refill Delivery Questions    How will the patient receive the medication? MEDRx   When does the patient need to receive the medication? 07/29/23   Shipping Address Home   Address in [facility] confirmed and updated if neccessary? Yes   Expected Copay ($) 0   Is the patient able to afford the medication copay? Yes   Payment Method zero copay   Days supply of Refill 28   Supplies needed? Alcohol Swabs   Refill activity completed? Yes   Refill activity plan Refill scheduled   Shipment/Pickup Date: 07/27/23            Current Outpatient Medications   Medication Sig    adalimumab (HUMIRA PEN) PnKt injection Inject 1 pen (40 mg total) into the skin every 14 (fourteen) days.    adalimumab (HUMIRA PEN) PnKt injection as directed Subcutaneous    albuterol (PROVENTIL/VENTOLIN HFA) 90 mcg/actuation inhaler 2 puff as needed Inhalation every 6 hrs for 30 days    amLODIPine (NORVASC) 5 MG tablet Take 5 mg by  mouth.    ammonium lactate (LAC-HYDRIN) 12 % lotion Apply topically 2 (two) times daily.    aspirin (ECOTRIN) 81 MG EC tablet Take 81 mg by mouth once daily.    cyclobenzaprine (FLEXERIL) 5 MG tablet TAKE 1 TABLET BY MOUTH THREE TIMES A DAY Orally tid prn spasm for 30 day(s)    hydroCHLOROthiazide (HYDRODIURIL) 25 MG tablet 1 tablet in the morning Orally Once a day for 90 days    lisinopriL (PRINIVIL,ZESTRIL) 40 MG tablet TAKE 1 TABLET BY MOUTH ONCE DAILY IN THE EVENING    metoprolol tartrate (LOPRESSOR) 25 MG tablet Take 25 mg by mouth.    multivitamin (THERAGRAN) per tablet Take 1 tablet by mouth once daily.    naproxen (NAPROSYN) 500 MG tablet TAKE 1 TABLET BY MOUTH EVERY 12 HOURS WITH FOOD OR MILK AS NEEDED for 30    TRELEGY ELLIPTA 200-62.5-25 mcg inhaler INHALE 1 PUFF ONCE DAILY    VENTOLIN HFA 90 mcg/actuation inhaler INHALE 2 PUFFS BY MOUTH EVERY 6 HOURS AS NEEDED FOR WHEEZING   Last reviewed on 3/28/2023 11:56 AM by Stan Wilkinson, PharmD    Review of patient's allergies indicates:   Allergen Reactions    Influenza virus vaccines Other (See Comments)    Last reviewed on  3/28/2023 11:56 AM by Stan Wilkinson      Tasks added this encounter   No tasks added.   Tasks due within next 3 months   No tasks due.     Evangelista Victoria, PharmD  [facility]  [address] [place] [id number]  Phone: [phone]  Fax: [phone]

## 2024-04-29 ENCOUNTER — INITIAL CONSULT (OUTPATIENT)
Dept: VASCULAR SURGERY | Facility: CLINIC | Age: 69
End: 2024-04-29
Attending: SURGERY
Payer: MEDICARE

## 2024-04-29 ENCOUNTER — HOSPITAL ENCOUNTER (OUTPATIENT)
Dept: RADIOLOGY | Facility: HOSPITAL | Age: 69
Discharge: HOME | End: 2024-04-29
Attending: SURGERY
Payer: MEDICARE

## 2024-04-29 ENCOUNTER — HOSPITAL ENCOUNTER (OUTPATIENT)
Dept: CARDIOLOGY | Facility: CLINIC | Age: 69
Discharge: HOME | End: 2024-04-29
Attending: SURGERY
Payer: MEDICARE

## 2024-04-29 VITALS
DIASTOLIC BLOOD PRESSURE: 89 MMHG | BODY MASS INDEX: 27.46 KG/M2 | TEMPERATURE: 98 F | HEART RATE: 58 BPM | WEIGHT: 191.81 LBS | SYSTOLIC BLOOD PRESSURE: 145 MMHG | HEIGHT: 70 IN

## 2024-04-29 DIAGNOSIS — I72.4: Primary | ICD-10-CM

## 2024-04-29 DIAGNOSIS — I72.4 FEMORAL ARTERY ANEURYSM: Primary | ICD-10-CM

## 2024-04-29 DIAGNOSIS — I71.40 ABDOMINAL AORTIC ANEURYSM (AAA) WITHOUT RUPTURE, UNSPECIFIED PART: Primary | ICD-10-CM

## 2024-04-29 DIAGNOSIS — Z01.818 PRE-OP EVALUATION: ICD-10-CM

## 2024-04-29 DIAGNOSIS — I71.40 ABDOMINAL AORTIC ANEURYSM (AAA) WITHOUT RUPTURE, UNSPECIFIED PART: ICD-10-CM

## 2024-04-29 LAB
OHS QRS DURATION: 96 MS
OHS QTC CALCULATION: 429 MS

## 2024-04-29 PROCEDURE — 75635 CT ANGIO ABDOMINAL ARTERIES: CPT | Mod: 26,,, | Performed by: RADIOLOGY

## 2024-04-29 PROCEDURE — 99213 OFFICE O/P EST LOW 20 MIN: CPT | Mod: PBBFAC,25 | Performed by: SURGERY

## 2024-04-29 PROCEDURE — 93005 ELECTROCARDIOGRAM TRACING: CPT | Mod: PBBFAC | Performed by: INTERNAL MEDICINE

## 2024-04-29 PROCEDURE — 71046 X-RAY EXAM CHEST 2 VIEWS: CPT | Mod: TC

## 2024-04-29 PROCEDURE — 93010 ELECTROCARDIOGRAM REPORT: CPT | Mod: S$PBB,,, | Performed by: INTERNAL MEDICINE

## 2024-04-29 PROCEDURE — 99999 PR PBB SHADOW E&M-EST. PATIENT-LVL III: CPT | Mod: PBBFAC,,, | Performed by: SURGERY

## 2024-04-29 PROCEDURE — 99203 OFFICE O/P NEW LOW 30 MIN: CPT | Mod: S$PBB,,, | Performed by: SURGERY

## 2024-04-29 PROCEDURE — 71046 X-RAY EXAM CHEST 2 VIEWS: CPT | Mod: 26,,, | Performed by: RADIOLOGY

## 2024-04-29 PROCEDURE — 25500020 PHARM REV CODE 255: Performed by: SURGERY

## 2024-04-29 PROCEDURE — 75635 CT ANGIO ABDOMINAL ARTERIES: CPT | Mod: TC

## 2024-04-29 RX ORDER — CARVEDILOL 6.25 MG
TABLET ORAL
COMMUNITY
Start: 2024-04-15

## 2024-04-29 RX ORDER — RIVAROXABAN 20 MG/1
TABLET, FILM COATED ORAL
COMMUNITY
Start: 2024-04-15

## 2024-04-29 RX ADMIN — IOHEXOL 100 ML: 350 INJECTION, SOLUTION INTRAVENOUS at 01:04

## 2024-04-29 NOTE — PROGRESS NOTES
VASCULAR SURGERY NOTE    Patient ID: Tan Gilliam is a 69 y.o. male.    I. HISTORY     Chief Complaint: Femoral artery aneurysms     HPI: Tan Gilliam is a 69 y.o. male with past medical history of HTN, psoriasis, asthma, CVA in  (lost sensation in left face) who is here today for new patient initial appointment for evaluation of bilateral femoral artery aneurysms. He discovered the aneurysms from CT imaging in the ER 3/16/24 for abdominal pain which was diagnosed diverticulitis. He reports feeling pulsating sensations in both legs for the past 3 months. He denies any pain or discomfort. He has not noticed this happening before. He states his activity level is normal, can walk up stairs, does yard work, mows lawns. Works as a . He denies chest pain, abdominal pain, nausea, vomiting, change in bowel habits.     ALLERGIES: Flu vaccine     SOCIAL HISTORY: previous smoker (quit , several packs/day for 25 years)    FAMILY HISTORY: none for AAA     MEDICATIONS: Xarelto (prescribed for aneurysm), aspirin, coreg, amlodipine, lisiniopril, Humira pen      Past Medical History:   Diagnosis Date    Asthma     CVA (cerebral vascular accident)     Hypertension         Past Surgical History:   Procedure Laterality Date    COLONOSCOPY N/A 2019    Procedure: COLONOSCOPY;  Surgeon: Luis Bogran-Reyes, MD;  Location: [facility];  Service: Endoscopy;  Laterality: N/A;    HEMORRHOID SURGERY      NOSE SURGERY      TONSILLECTOMY         Social History     Occupational History    Not on file   Tobacco Use    Smoking status: Former     Current packs/day: 0.00     Types: Cigarettes, Cigars     Quit date:      Years since quittin.3    Smokeless tobacco: Never   Substance and Sexual Activity    Alcohol use: Yes     Alcohol/week: 6.0 standard drinks of alcohol     Types: 6 Cans of beer per week    Drug use: Never    Sexual activity: Not Currently         Review of Systems   Constitutional: Negative.   HENT:  Negative.     Eyes: Negative.    Cardiovascular: Negative.  Negative for chest pain and palpitations.   Respiratory: Negative.  Negative for shortness of breath and wheezing.    Musculoskeletal:  Positive for back pain and joint pain.   Neurological: Negative.    Psychiatric/Behavioral: Negative.           II. PHYSICAL EXAM     Physical Exam  Constitutional:       Appearance: Normal appearance. He is not ill-appearing or diaphoretic.   HENT:      Head: Normocephalic and atraumatic.   Eyes:      General: No scleral icterus.        Right eye: No discharge.         Left eye: No discharge.      Extraocular Movements: Extraocular movements intact.      Conjunctiva/sclera: Conjunctivae normal.   Cardiovascular:      Rate and Rhythm: Normal rate and regular rhythm.   Pulmonary:      Effort: Pulmonary effort is normal. No respiratory distress.   Musculoskeletal:         General: Normal range of motion.      Cervical back: Normal range of motion and neck supple.      Right lower leg: No edema.      Left lower leg: No edema.   Skin:     General: Skin is warm and dry.      Coloration: Skin is not jaundiced or pale.      Findings: No erythema or rash.   Neurological:      General: No focal deficit present.      Mental Status: He is alert and oriented to person, place, and time.   Psychiatric:         Mood and Affect: Mood normal.         Behavior: Behavior normal.       VASC:  RLE: 4+ palpable femoral pulse, aneurysm palpable, 2+ popliteal pulse  LLE: 4+ palpable femoral pulse, aneurysm palpable, 2+ popliteal pulse     III. ASSESSMENT & PLAN (MEDICAL DECISION MAKING)       Imaging Results: (I have personally reviewed all images and provided interpretation below)    CT Abd/pelvis 3/16/24: Bilateral common femoral artery aneurysms, 3.5 cm in the right and 4.1 cm on the left.       Assessment/Diagnosis and Plan:    1. Bilateral femoral artery aneurysm        69 y.o. male with past medical history of HTN, psoriasis, asthma, CVA in  2004 (lost sensation in left face) here with bilateral femoral artery aneurysms 3.5 cm right and 4.5cm left.     Plan:   - Will need to obtain CTA runoff bilateral lower extremities to assess run-off and for other aneurysms  - Discussed need for surgical repair, will plan to repair left femoral aneurysm first assuming no other aneurysm is found  - Plan for open repair 5/21/24  - Informed consent and blood consent obtained in clinic     Mohamud Shipley MD  [facility]     I have seen and examined the patient and obtained as his history.  I agree with the above note.    MOI Childers II, MD, [facility]  Vascular Surgery  [facility]

## 2024-05-01 LAB
CREAT SERPL-MCNC: 1.4 MG/DL (ref 0.5–1.4)
SAMPLE: NORMAL

## 2024-05-06 ENCOUNTER — HOSPITAL ENCOUNTER (OUTPATIENT)
Dept: VASCULAR SURGERY | Facility: CLINIC | Age: 69
Discharge: HOME | End: 2024-05-06
Attending: SURGERY
Payer: MEDICARE

## 2024-05-06 ENCOUNTER — OFFICE VISIT (OUTPATIENT)
Dept: VASCULAR SURGERY | Facility: CLINIC | Age: 69
End: 2024-05-06
Attending: SURGERY
Payer: MEDICARE

## 2024-05-06 VITALS
BODY MASS INDEX: 27.46 KG/M2 | HEIGHT: 70 IN | TEMPERATURE: 98 F | WEIGHT: 191.81 LBS | SYSTOLIC BLOOD PRESSURE: 130 MMHG | DIASTOLIC BLOOD PRESSURE: 86 MMHG | HEART RATE: 63 BPM

## 2024-05-06 DIAGNOSIS — I72.4 FEMORAL ARTERY ANEURYSM, BILATERAL: ICD-10-CM

## 2024-05-06 DIAGNOSIS — I73.9 PVD (PERIPHERAL VASCULAR DISEASE): Primary | ICD-10-CM

## 2024-05-06 DIAGNOSIS — I72.4 BILATERAL POPLITEAL ARTERY ANEURYSM: Primary | ICD-10-CM

## 2024-05-06 DIAGNOSIS — Z01.818 PRE-OP EXAM: Primary | ICD-10-CM

## 2024-05-06 DIAGNOSIS — I72.4 FEMORAL ARTERY ANEURYSM: ICD-10-CM

## 2024-05-06 DIAGNOSIS — I72.4 FEMORAL ARTERY ANEURYSM: Primary | ICD-10-CM

## 2024-05-06 PROCEDURE — 93923 UPR/LXTR ART STDY 3+ LVLS: CPT | Mod: 26,S$PBB,, | Performed by: SURGERY

## 2024-05-06 PROCEDURE — 99213 OFFICE O/P EST LOW 20 MIN: CPT | Mod: PBBFAC,25 | Performed by: SURGERY

## 2024-05-06 PROCEDURE — 99213 OFFICE O/P EST LOW 20 MIN: CPT | Mod: 25,S$PBB,, | Performed by: SURGERY

## 2024-05-06 PROCEDURE — 93970 EXTREMITY STUDY: CPT | Mod: 26,S$PBB,, | Performed by: SURGERY

## 2024-05-06 PROCEDURE — 99999 PR PBB SHADOW E&M-EST. PATIENT-LVL III: CPT | Mod: PBBFAC,,, | Performed by: SURGERY

## 2024-05-06 PROCEDURE — 93923 UPR/LXTR ART STDY 3+ LVLS: CPT | Mod: PBBFAC | Performed by: SURGERY

## 2024-05-06 PROCEDURE — 93970 EXTREMITY STUDY: CPT | Mod: PBBFAC | Performed by: SURGERY

## 2024-05-06 NOTE — H&P (VIEW-ONLY)
VASCULAR SURGERY NOTE    Patient ID: Tan Gilliam is a 69 y.o. male.    I. HISTORY     Chief Complaint: Femoral artery aneurysms     HPI: Tan Gilliam is a 69 y.o. 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 He discovered the aneurysms from CT imaging in the ER 3/16/24 for abdominal pain which was diagnosed diverticulitis. He reports feeling pulsating sensations in both legs for the past 3 months. He denies any pain or discomfort. He has not noticed this happening before. He states his activity level is normal, can walk up stairs, does yard work, mows lawns. Works as a . He denies chest pain, abdominal pain, nausea, vomiting, change in bowel habits.     ALLERGIES: Flu vaccine     SOCIAL HISTORY: previous smoker (quit 1991, several packs/day for 25 years)    FAMILY HISTORY:  Brother recently diagnosed with AAA    MEDICATIONS: Xarelto (prescribed for aneurysm), aspirin, coreg, amlodipine, lisiniopril, Humira pen    Interval History: 5/6/24  Presents for follow up.  Since this time he has had a CTA abdomen and pelvis with runoff.  Unfortunately poor quality.  CT showing bilateral popliteal aneurysms right greater than left. Right Popliteal Aneurysmal dilatation measuring up to  54 x 60 mm. Left Popliteal Aneurysmal dilatation of the popliteal artery measuring up to 38 x 42 mm.     Of note patient has discussing that he is having some tingling and pain in his calf and in his foot of his left lower extremity.  States it is mostly at night.  Worse with lying down, improved by walking.    Past Medical History:   Diagnosis Date    Asthma     CVA (cerebral vascular accident)     Hypertension         Past Surgical History:   Procedure Laterality Date    COLONOSCOPY N/A 4/25/2019    Procedure: COLONOSCOPY;  Surgeon: Luis Bogran-Reyes, MD;  Location:  [facility];  Service: Endoscopy;  Laterality: N/A;    HEMORRHOID SURGERY      NOSE SURGERY      TONSILLECTOMY         Social History     Occupational History    Not on file   Tobacco Use    Smoking status: Former     Current packs/day: 0.00     Types: Cigarettes, Cigars     Quit date:      Years since quittin.3    Smokeless tobacco: Never   Substance and Sexual Activity    Alcohol use: Yes     Alcohol/week: 6.0 standard drinks of alcohol     Types: 6 Cans of beer per week    Drug use: Never    Sexual activity: Not Currently         Review of Systems   Constitutional: Negative.   HENT: Negative.     Eyes: Negative.    Cardiovascular: Negative.  Negative for chest pain and palpitations.   Respiratory: Negative.  Negative for shortness of breath and wheezing.    Musculoskeletal:  Positive for back pain and joint pain.   Neurological: Negative.    Psychiatric/Behavioral: Negative.           II. PHYSICAL EXAM     Physical Exam  Constitutional:       Appearance: Normal appearance. He is not ill-appearing or diaphoretic.   HENT:      Head: Normocephalic and atraumatic.   Eyes:      General: No scleral icterus.        Right eye: No discharge.         Left eye: No discharge.      Extraocular Movements: Extraocular movements intact.      Conjunctiva/sclera: Conjunctivae normal.   Cardiovascular:      Rate and Rhythm: Normal rate and regular rhythm.   Pulmonary:      Effort: Pulmonary effort is normal. No respiratory distress.   Musculoskeletal:         General: Normal range of motion.      Cervical back: Normal range of motion and neck supple.   Skin:     General: Skin is warm and dry.   Neurological:      General: No focal deficit present.      Mental Status: He is alert and oriented to person, place, and time.   Psychiatric:         Mood and Affect: Mood normal.         Behavior: Behavior normal.       VASC:  RLE: 4+ palpable femoral pulse, aneurysm palpable, 2+ popliteal pulse; 1+ DP, Absent PT  LLE: 4+ palpable  femoral pulse, aneurysm palpable, 2+ popliteal pulse ; 2+ DP, Absent PT    III. ASSESSMENT & PLAN (MEDICAL DECISION MAKING)       Imaging Results: (I have personally reviewed all images and provided interpretation below)      CTA abdomen and pelvis with runoff.  Bilateral common femoral aneurysms.  Contrast runs out at about the level of his bilateral popliteal aneurysms.  This makes runoff not distinguishable.  Right Popliteal Aneurysmal dilatation measuring up to  54 x 60 mm. Left Popliteal Aneurysmal dilatation of the popliteal artery measuring up to 38 x 42 mm.     Assessment/Diagnosis and Plan:    1. Bilateral popliteal artery aneurysm    2. Femoral artery aneurysm, bilateral        69 y.o. 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  Is risk of thrombosis is higher with popliteal aneurysms versus common femoral and so would plan for repair of his right popliteal aneurysm first.     Plan:   - discussed with patient that given his multiple aneurysms this will require staged repair.  Discussed that given the size of the right popliteal aneurysm we will approach this 1st.  Discussed that he will need right lower extremity angiography at the same time as his repair to assess his run-off  -we will get vein mapping today  - Plan for right popliteal artery aneurysm repair with right lower extremity angiography  5/21/24 we will change case request  - Informed consent and blood consent obtained in clinic    Oren Grullon MD  [facility] Surgery PGY-3

## 2024-05-06 NOTE — PROGRESS NOTES
VASCULAR SURGERY NOTE    Patient ID: Tan Gilliam is a 69 y.o. male.    I. HISTORY     Chief Complaint: Femoral artery aneurysms     HPI: Tan Gilliam is a 69 y.o. 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 He discovered the aneurysms from CT imaging in the ER 3/16/24 for abdominal pain which was diagnosed diverticulitis. He reports feeling pulsating sensations in both legs for the past 3 months. He denies any pain or discomfort. He has not noticed this happening before. He states his activity level is normal, can walk up stairs, does yard work, mows lawns. Works as a . He denies chest pain, abdominal pain, nausea, vomiting, change in bowel habits.     ALLERGIES: Flu vaccine     SOCIAL HISTORY: previous smoker (quit 1991, several packs/day for 25 years)    FAMILY HISTORY:  Brother recently diagnosed with AAA    MEDICATIONS: Xarelto (prescribed for aneurysm), aspirin, coreg, amlodipine, lisiniopril, Humira pen    Interval History: 5/6/24  Presents for follow up.  Since this time he has had a CTA abdomen and pelvis with runoff.  Unfortunately poor quality.  CT showing bilateral popliteal aneurysms right greater than left. Right Popliteal Aneurysmal dilatation measuring up to  54 x 60 mm. Left Popliteal Aneurysmal dilatation of the popliteal artery measuring up to 38 x 42 mm.     Of note patient has discussing that he is having some tingling and pain in his calf and in his foot of his left lower extremity.  States it is mostly at night.  Worse with lying down, improved by walking.    Past Medical History:   Diagnosis Date    Asthma     CVA (cerebral vascular accident)     Hypertension         Past Surgical History:   Procedure Laterality Date    COLONOSCOPY N/A 4/25/2019    Procedure: COLONOSCOPY;  Surgeon: Luis Bogran-Reyes, MD;  Location:  [facility];  Service: Endoscopy;  Laterality: N/A;    HEMORRHOID SURGERY      NOSE SURGERY      TONSILLECTOMY         Social History     Occupational History    Not on file   Tobacco Use    Smoking status: Former     Current packs/day: 0.00     Types: Cigarettes, Cigars     Quit date:      Years since quittin.3    Smokeless tobacco: Never   Substance and Sexual Activity    Alcohol use: Yes     Alcohol/week: 6.0 standard drinks of alcohol     Types: 6 Cans of beer per week    Drug use: Never    Sexual activity: Not Currently         Review of Systems   Constitutional: Negative.   HENT: Negative.     Eyes: Negative.    Cardiovascular: Negative.  Negative for chest pain and palpitations.   Respiratory: Negative.  Negative for shortness of breath and wheezing.    Musculoskeletal:  Positive for back pain and joint pain.   Neurological: Negative.    Psychiatric/Behavioral: Negative.           II. PHYSICAL EXAM     Physical Exam  Constitutional:       Appearance: Normal appearance. He is not ill-appearing or diaphoretic.   HENT:      Head: Normocephalic and atraumatic.   Eyes:      General: No scleral icterus.        Right eye: No discharge.         Left eye: No discharge.      Extraocular Movements: Extraocular movements intact.      Conjunctiva/sclera: Conjunctivae normal.   Cardiovascular:      Rate and Rhythm: Normal rate and regular rhythm.   Pulmonary:      Effort: Pulmonary effort is normal. No respiratory distress.   Musculoskeletal:         General: Normal range of motion.      Cervical back: Normal range of motion and neck supple.   Skin:     General: Skin is warm and dry.   Neurological:      General: No focal deficit present.      Mental Status: He is alert and oriented to person, place, and time.   Psychiatric:         Mood and Affect: Mood normal.         Behavior: Behavior normal.       VASC:  RLE: 4+ palpable femoral pulse, aneurysm palpable, 2+ popliteal pulse; 1+ DP, Absent PT  LLE: 4+ palpable  femoral pulse, aneurysm palpable, 2+ popliteal pulse ; 2+ DP, Absent PT    III. ASSESSMENT & PLAN (MEDICAL DECISION MAKING)       Imaging Results: (I have personally reviewed all images and provided interpretation below)      CTA abdomen and pelvis with runoff.  Bilateral common femoral aneurysms.  Contrast runs out at about the level of his bilateral popliteal aneurysms.  This makes runoff not distinguishable.  Right Popliteal Aneurysmal dilatation measuring up to  54 x 60 mm. Left Popliteal Aneurysmal dilatation of the popliteal artery measuring up to 38 x 42 mm.     Assessment/Diagnosis and Plan:    1. Bilateral popliteal artery aneurysm    2. Femoral artery aneurysm, bilateral        69 y.o. 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  Is risk of thrombosis is higher with popliteal aneurysms versus common femoral and so would plan for repair of his right popliteal aneurysm first.     Plan:   - discussed with patient that given his multiple aneurysms this will require staged repair.  Discussed that given the size of the right popliteal aneurysm we will approach this 1st.  Discussed that he will need right lower extremity angiography at the same time as his repair to assess his run-off  -we will get vein mapping today  - Plan for right popliteal artery aneurysm repair with right lower extremity angiography  5/21/24 we will change case request  - Informed consent and blood consent obtained in clinic    Oren Grullon MD  [facility] Surgery PGY-3

## 2024-05-20 ENCOUNTER — TELEPHONE (OUTPATIENT)
Dept: VASCULAR SURGERY | Facility: CLINIC | Age: 69
End: 2024-05-20
Payer: MEDICARE

## 2024-05-20 ENCOUNTER — ANESTHESIA EVENT (OUTPATIENT)
Dept: SURGERY | Facility: HOSPITAL | Age: 69
DRG: 254 | End: 2024-05-20
Payer: MEDICARE

## 2024-05-20 NOTE — TELEPHONE ENCOUNTER
"Spoke with Vishnu(son), time of arrival 0500am 2nd floor [facility] for Mr Mane surgery on 5/21/2024 confirmed. Vishnu also states," my dad last dose of Xarelto was on May 18th per pre-op instructions."  "

## 2024-05-20 NOTE — ANESTHESIA PREPROCEDURE EVALUATION
[facility]  Anesthesia Pre-Operative Evaluation        Patient Name: Tan Gilliam  YOB: 1955  MRN: [id number]    SUBJECTIVE:     Pre-operative Evaluation for Procedure(s) (LRB):  CREATION, BYPASS, ARTERIAL, FEMORAL TO POPLITEAL, USING GRAFT (Right)  ANGIOGRAM - right lower extremity (Right)     05/21/2024    Tan Gilliam is a 69 y.o. 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     He now presents for the above procedure(s) with Vascular Surgery - Dr. Childers.    Previous Airway: None documented.    Patient Active Problem List   Diagnosis    Essential hypertension    Psoriasis    Arthralgia    Trigger finger, right middle finger    Paresthesia and pain of both upper extremities    Left sided sciatica    Mild intermittent asthma without complication    Symptomatic bradycardia    Femoral artery aneurysm    Pre-op exam    PVD (peripheral vascular disease)       Review of patient's allergies indicates:   Allergen Reactions    Influenza virus vaccines Other (See Comments)       Current Outpatient Medications   Medication Instructions    adalimumab-adbm (CYLTEZO(CF) PEN) 40 mg, Subcutaneous, Every 14 days    albuterol (PROVENTIL/VENTOLIN HFA) 90 mcg/actuation inhaler 2 puffs, Inhalation, Every 6 hours PRN, Rescue    amLODIPine (NORVASC) 5 mg, Oral    ammonium lactate (LAC-HYDRIN) 12 % lotion Topical (Top), 2 times daily    aspirin (ECOTRIN) 81 mg, Oral, Daily    COREG 6.25 mg tablet Coreg 6.25 mg tablet, [RxNorm: 952190]    DUPIXENT  mg, Subcutaneous, Every 14 days    lisinopriL (PRINIVIL,ZESTRIL) 40 MG tablet TAKE 1 TABLET BY MOUTH ONCE DAILY IN THE EVENING    naproxen (NAPROSYN) 500 MG tablet TAKE 1 TABLET BY MOUTH EVERY 12 HOURS WITH  FOOD OR MILK AS NEEDED for 30    TRELEGY ELLIPTA 200-62.5-25 mcg inhaler INHALE 1 PUFF ONCE DAILY    VENTOLIN HFA 90 mcg/actuation inhaler INHALE 2 PUFFS BY MOUTH EVERY 6 HOURS AS NEEDED FOR WHEEZING    XARELTO 20 mg Tab Xarelto 20 mg tablet, [RxNorm: 4956953]       Past Surgical History:   Procedure Laterality Date    COLONOSCOPY N/A 4/25/2019    Procedure: COLONOSCOPY;  Surgeon: Luis Bogran-Reyes, MD;  Location: [facility];  Service: Endoscopy;  Laterality: N/A;    HEMORRHOID SURGERY      NOSE SURGERY      TONSILLECTOMY         Social History     Substance and Sexual Activity   Drug Use Never     Alcohol Use: Not on file     Tobacco Use: Medium Risk (5/6/2024)    Patient History     Smoking Tobacco Use: Former     Smokeless Tobacco Use: Never     Passive Exposure: Not on file       OBJECTIVE:     Vital Signs Range (Last 24H):         Significant Labs    Heme Profile  Lab Results   Component Value Date    WBC 11.70 03/16/2024    HGB 14.5 03/16/2024    HCT 42.8 03/16/2024     03/16/2024       Coagulation Studies  Lab Results   Component Value Date    INR 1.1 03/16/2024    APTT 32.9 03/16/2024       BMP  Lab Results   Component Value Date     03/16/2024    K 4.0 03/16/2024     03/16/2024    CO2 25 03/16/2024    BUN 18 03/16/2024    CREATININE 1.16 03/16/2024    MG 2.3 09/24/2022       Liver Function Tests  Lab Results   Component Value Date    AST 43 03/16/2024    ALT 27 03/16/2024    ALKPHOS 98 03/16/2024    BILITOT 1.5 (H) 03/16/2024    PROT 7.0 03/16/2024    ALBUMIN 4.0 03/16/2024       Lipid Profile  Lab Results   Component Value Date    CHOL 165 06/01/2020    HDL 30 (L) 06/01/2020    TRIG 159 (H) 06/01/2020       Endocrine Profile  Lab Results   Component Value Date    TSH 0.83 09/24/2022       Diagnostic Studies    CTA Runoff Abdomen/Pelvis/Bilateral LE (04/29/2024)  Aneurysmal dilatation of the bilateral common iliac, common femoral, external iliac, and bilateral popliteal arteries.  Absent  flow throughout the calf vasculature bilaterally and popliteal artery on the left.     Thrombosed aneurysmal dilatation of the right coronary artery.  Further evaluation with CTA of the chest is recommended.     Prominence of the origin of the left renal artery, perhaps another small aneurysm.    Cardiac Studies    EKG:   Results for orders placed or performed during the hospital encounter of 04/29/24   SCHEDULED EKG 12-LEAD (to Muse)    Collection Time: 04/29/24 12:37 PM   Result Value Ref Range    QRS Duration 96 ms    [facility] QTC Calculation 429 ms    Narrative    Test Reason : Z01.818,    Vent. Rate : 059 BPM     Atrial Rate : 059 BPM     P-R Int : 200 ms          QRS Dur : 096 ms      QT Int : 434 ms       P-R-T Axes : 078 -03 043 degrees     QTc Int : 429 ms    Sinus bradycardia with sinus arrhythmia with occasional Premature  ventricular complexes  Otherwise normal ECG  When compared with ECG of 24-SEP-2022 13:11,  No significant change was found  Confirmed by SHAY BARTON MD ([id number]) on 4/29/2024 4:43:24 PM    Referred By: MOI LEOS II           Confirmed By:SHAY BARTON MD         Transthoracic Echo (09/25/2022):  1. The study quality is good.   2. Global left ventricular systolic function is borderline normal. The left ventricular ejection fraction is 50%.    3. Right ventricular systolic function is normal.   4. The left atrium is mildly enlarged.   5. Mild (1+) mitral regurgitation.   6. No pericardial effusion.       ASSESSMENT/PLAN:     Tan Gilliam is a 69 y.o. male with bilateral femoral and popliteal aneurysms presenting for right popliteal aneurysm repair.      Pre-op Assessment    I have reviewed the Patient Summary Reports.     I have reviewed the Nursing Notes. I have reviewed the NPO Status.   I have reviewed the Medications.     Review of Systems  Anesthesia Hx:               Denies Personal Hx of Anesthesia complications.                    Social:  Former Smoker        Hematology/Oncology:       -- Denies Anemia:                  Denies Current/Recent Cancer                Cardiovascular:     Hypertension Valvular problems/Murmurs, MR   Denies CAD.     Denies Dysrhythmias.    Denies CHF.       ECG has been reviewed.                          Pulmonary:    Asthma mild    Denies Sleep Apnea.                Renal/:   Denies Chronic Renal Disease.                Hepatic/GI:      Denies GERD. Denies Liver Disease.            Musculoskeletal:  Arthritis (psoriatic)      Joint Disease:  Arthritis, Psoriatic Arthritis          Neurological:   CVA    Denies Seizures.        Psoriatic Arthritis                           Endocrine:  Denies Diabetes.         Denies Obesity / BMI > 30  Psych:  Denies Psychiatric History.                  Physical Exam  General: Well nourished, Cooperative and Alert    Airway:  Mallampati: IV / III  Mouth Opening: Normal  TM Distance: Normal  Tongue: Normal  Neck ROM: Normal ROM    Dental:  Edentulous        Anesthesia Plan  Type of Anesthesia, risks & benefits discussed:    Anesthesia Type: Gen ETT  Intra-op Monitoring Plan: Standard ASA Monitors and Art Line  Post Op Pain Control Plan: multimodal analgesia and IV/PO Opioids PRN  Induction:  IV  Airway Plan: Direct, Post-Induction  Informed Consent: Informed consent signed with the Patient and all parties understand the risks and agree with anesthesia plan.  All questions answered.   ASA Score: 3  Day of Surgery Review of History & Physical: H&P Update referred to the surgeon/provider.  Anesthesia Plan Notes:   Anesthesia E-consent completed with [facility]  in room. Discussed plan for general anesthesia with arterial line placement.     Ready For Surgery From Anesthesia Perspective.     .

## 2024-05-21 ENCOUNTER — HOSPITAL ENCOUNTER (INPATIENT)
Facility: HOSPITAL | Age: 69
LOS: 2 days | Discharge: HOME | DRG: 254 | End: 2024-05-23
Attending: SURGERY | Admitting: SURGERY
Payer: MEDICARE

## 2024-05-21 ENCOUNTER — ANESTHESIA (OUTPATIENT)
Dept: SURGERY | Facility: HOSPITAL | Age: 69
DRG: 254 | End: 2024-05-21
Payer: MEDICARE

## 2024-05-21 DIAGNOSIS — I72.4 FEMORAL ARTERY ANEURYSM: ICD-10-CM

## 2024-05-21 DIAGNOSIS — I73.9 PVD (PERIPHERAL VASCULAR DISEASE): Primary | ICD-10-CM

## 2024-05-21 DIAGNOSIS — I49.9 ARRHYTHMIA: ICD-10-CM

## 2024-05-21 LAB
ABO + RH BLD: NORMAL
ANION GAP SERPL CALC-SCNC: 8 MMOL/L (ref 8–16)
BASOPHILS # BLD AUTO: 0.03 K/UL (ref 0–0.2)
BASOPHILS # BLD AUTO: 0.03 K/UL (ref 0–0.2)
BASOPHILS NFR BLD: 0.4 % (ref 0–1.9)
BASOPHILS NFR BLD: 0.4 % (ref 0–1.9)
BLD GP AB SCN CELLS X3 SERPL QL: NORMAL
BUN SERPL-MCNC: 11 MG/DL (ref 8–23)
BUN SERPL-MCNC: 11 MG/DL (ref 8–23)
BUN SERPL-MCNC: 13 MG/DL (ref 8–23)
CALCIUM SERPL-MCNC: 8.3 MG/DL (ref 8.7–10.5)
CALCIUM SERPL-MCNC: 8.3 MG/DL (ref 8.7–10.5)
CALCIUM SERPL-MCNC: 8.9 MG/DL (ref 8.7–10.5)
CHLORIDE SERPL-SCNC: 106 MMOL/L (ref 95–110)
CHLORIDE SERPL-SCNC: 108 MMOL/L (ref 95–110)
CHLORIDE SERPL-SCNC: 108 MMOL/L (ref 95–110)
CO2 SERPL-SCNC: 24 MMOL/L (ref 23–29)
CO2 SERPL-SCNC: 24 MMOL/L (ref 23–29)
CO2 SERPL-SCNC: 25 MMOL/L (ref 23–29)
CREAT SERPL-MCNC: 1 MG/DL (ref 0.5–1.4)
CREAT SERPL-MCNC: 1 MG/DL (ref 0.5–1.4)
CREAT SERPL-MCNC: 1.2 MG/DL (ref 0.5–1.4)
DIFFERENTIAL METHOD BLD: ABNORMAL
DIFFERENTIAL METHOD BLD: ABNORMAL
EOSINOPHIL # BLD AUTO: 0 K/UL (ref 0–0.5)
EOSINOPHIL # BLD AUTO: 0 K/UL (ref 0–0.5)
EOSINOPHIL NFR BLD: 0.1 % (ref 0–8)
EOSINOPHIL NFR BLD: 0.1 % (ref 0–8)
ERYTHROCYTE [DISTWIDTH] IN BLOOD BY AUTOMATED COUNT: 12.6 % (ref 11.5–14.5)
ERYTHROCYTE [DISTWIDTH] IN BLOOD BY AUTOMATED COUNT: 12.6 % (ref 11.5–14.5)
EST. GFR  (NO RACE VARIABLE): >60 ML/MIN/1.73 M^2
GLUCOSE SERPL-MCNC: 141 MG/DL (ref 70–110)
GLUCOSE SERPL-MCNC: 141 MG/DL (ref 70–110)
GLUCOSE SERPL-MCNC: 153 MG/DL (ref 70–110)
HCT VFR BLD AUTO: 37.9 % (ref 40–54)
HCT VFR BLD AUTO: 37.9 % (ref 40–54)
HGB BLD-MCNC: 12.9 G/DL (ref 14–18)
HGB BLD-MCNC: 12.9 G/DL (ref 14–18)
IMM GRANULOCYTES # BLD AUTO: 0.04 K/UL (ref 0–0.04)
IMM GRANULOCYTES # BLD AUTO: 0.04 K/UL (ref 0–0.04)
IMM GRANULOCYTES NFR BLD AUTO: 0.5 % (ref 0–0.5)
IMM GRANULOCYTES NFR BLD AUTO: 0.5 % (ref 0–0.5)
LYMPHOCYTES # BLD AUTO: 0.7 K/UL (ref 1–4.8)
LYMPHOCYTES # BLD AUTO: 0.7 K/UL (ref 1–4.8)
LYMPHOCYTES NFR BLD: 8.3 % (ref 18–48)
LYMPHOCYTES NFR BLD: 8.3 % (ref 18–48)
MAGNESIUM SERPL-MCNC: 2.1 MG/DL (ref 1.6–2.6)
MCH RBC QN AUTO: 29.8 PG (ref 27–31)
MCH RBC QN AUTO: 29.8 PG (ref 27–31)
MCHC RBC AUTO-ENTMCNC: 34 G/DL (ref 32–36)
MCHC RBC AUTO-ENTMCNC: 34 G/DL (ref 32–36)
MCV RBC AUTO: 88 FL (ref 82–98)
MCV RBC AUTO: 88 FL (ref 82–98)
MONOCYTES # BLD AUTO: 0.1 K/UL (ref 0.3–1)
MONOCYTES # BLD AUTO: 0.1 K/UL (ref 0.3–1)
MONOCYTES NFR BLD: 1.1 % (ref 4–15)
MONOCYTES NFR BLD: 1.1 % (ref 4–15)
NEUTROPHILS # BLD AUTO: 7.7 K/UL (ref 1.8–7.7)
NEUTROPHILS # BLD AUTO: 7.7 K/UL (ref 1.8–7.7)
NEUTROPHILS NFR BLD: 89.6 % (ref 38–73)
NEUTROPHILS NFR BLD: 89.6 % (ref 38–73)
NRBC BLD-RTO: 0 /100 WBC
NRBC BLD-RTO: 0 /100 WBC
PHOSPHATE SERPL-MCNC: 2.5 MG/DL (ref 2.7–4.5)
PLATELET # BLD AUTO: 218 K/UL (ref 150–450)
PLATELET # BLD AUTO: 218 K/UL (ref 150–450)
PMV BLD AUTO: 9.6 FL (ref 9.2–12.9)
PMV BLD AUTO: 9.6 FL (ref 9.2–12.9)
POC ACTIVATED CLOTTING TIME K: 134 SEC (ref 74–137)
POC ACTIVATED CLOTTING TIME K: 140 SEC (ref 74–137)
POC ACTIVATED CLOTTING TIME K: 250 SEC (ref 74–137)
POC ACTIVATED CLOTTING TIME K: 250 SEC (ref 74–137)
POTASSIUM SERPL-SCNC: 4.1 MMOL/L (ref 3.5–5.1)
POTASSIUM SERPL-SCNC: 4.1 MMOL/L (ref 3.5–5.1)
POTASSIUM SERPL-SCNC: 4.4 MMOL/L (ref 3.5–5.1)
RBC # BLD AUTO: 4.33 M/UL (ref 4.6–6.2)
RBC # BLD AUTO: 4.33 M/UL (ref 4.6–6.2)
SAMPLE: ABNORMAL
SAMPLE: NORMAL
SODIUM SERPL-SCNC: 139 MMOL/L (ref 136–145)
SODIUM SERPL-SCNC: 140 MMOL/L (ref 136–145)
SODIUM SERPL-SCNC: 140 MMOL/L (ref 136–145)
SPECIMEN OUTDATE: NORMAL
WBC # BLD AUTO: 8.56 K/UL (ref 3.9–12.7)
WBC # BLD AUTO: 8.56 K/UL (ref 3.9–12.7)

## 2024-05-21 PROCEDURE — 25000003 PHARM REV CODE 250: Performed by: STUDENT IN AN ORGANIZED HEALTH CARE EDUCATION/TRAINING PROGRAM

## 2024-05-21 PROCEDURE — 25000003 PHARM REV CODE 250

## 2024-05-21 PROCEDURE — 93010 ELECTROCARDIOGRAM REPORT: CPT | Mod: ,,, | Performed by: INTERNAL MEDICINE

## 2024-05-21 PROCEDURE — 36000709 HC OR TIME LEV III EA ADD 15 MIN: Performed by: SURGERY

## 2024-05-21 PROCEDURE — 63600175 PHARM REV CODE 636 W HCPCS

## 2024-05-21 PROCEDURE — 84100 ASSAY OF PHOSPHORUS: CPT

## 2024-05-21 PROCEDURE — C1769 GUIDE WIRE: HCPCS | Performed by: SURGERY

## 2024-05-21 PROCEDURE — 71000033 HC RECOVERY, INTIAL HOUR: Performed by: SURGERY

## 2024-05-21 PROCEDURE — 83735 ASSAY OF MAGNESIUM: CPT

## 2024-05-21 PROCEDURE — 85025 COMPLETE CBC W/AUTO DIFF WBC: CPT | Performed by: STUDENT IN AN ORGANIZED HEALTH CARE EDUCATION/TRAINING PROGRAM

## 2024-05-21 PROCEDURE — 20600001 HC STEP DOWN PRIVATE ROOM

## 2024-05-21 PROCEDURE — 63600175 PHARM REV CODE 636 W HCPCS: Performed by: STUDENT IN AN ORGANIZED HEALTH CARE EDUCATION/TRAINING PROGRAM

## 2024-05-21 PROCEDURE — 93005 ELECTROCARDIOGRAM TRACING: CPT

## 2024-05-21 PROCEDURE — 27000221 HC OXYGEN, UP TO 24 HOURS

## 2024-05-21 PROCEDURE — B41FYZZ FLUOROSCOPY OF RIGHT LOWER EXTREMITY ARTERIES USING OTHER CONTRAST: ICD-10-PCS | Performed by: SURGERY

## 2024-05-21 PROCEDURE — 99499 UNLISTED E&M SERVICE: CPT | Mod: ,,, | Performed by: ANESTHESIOLOGY

## 2024-05-21 PROCEDURE — 71000039 HC RECOVERY, EACH ADD'L HOUR: Performed by: SURGERY

## 2024-05-21 PROCEDURE — 37000008 HC ANESTHESIA 1ST 15 MINUTES: Performed by: SURGERY

## 2024-05-21 PROCEDURE — 36620 INSERTION CATHETER ARTERY: CPT | Mod: 59,,, | Performed by: ANESTHESIOLOGY

## 2024-05-21 PROCEDURE — 80048 BASIC METABOLIC PNL TOTAL CA: CPT | Performed by: STUDENT IN AN ORGANIZED HEALTH CARE EDUCATION/TRAINING PROGRAM

## 2024-05-21 PROCEDURE — 27201037 HC PRESSURE MONITORING SET UP

## 2024-05-21 PROCEDURE — 63600175 PHARM REV CODE 636 W HCPCS: Performed by: SURGERY

## 2024-05-21 PROCEDURE — 27201423 OPTIME MED/SURG SUP & DEVICES STERILE SUPPLY: Performed by: SURGERY

## 2024-05-21 PROCEDURE — 86850 RBC ANTIBODY SCREEN: CPT | Performed by: STUDENT IN AN ORGANIZED HEALTH CARE EDUCATION/TRAINING PROGRAM

## 2024-05-21 PROCEDURE — 36415 COLL VENOUS BLD VENIPUNCTURE: CPT | Performed by: STUDENT IN AN ORGANIZED HEALTH CARE EDUCATION/TRAINING PROGRAM

## 2024-05-21 PROCEDURE — 36000708 HC OR TIME LEV III 1ST 15 MIN: Performed by: SURGERY

## 2024-05-21 PROCEDURE — 37000009 HC ANESTHESIA EA ADD 15 MINS: Performed by: SURGERY

## 2024-05-21 PROCEDURE — 25500020 PHARM REV CODE 255: Performed by: SURGERY

## 2024-05-21 PROCEDURE — 35141 REPAIR DEFECT OF ARTERY: CPT | Mod: RT,,, | Performed by: SURGERY

## 2024-05-21 PROCEDURE — C1887 CATHETER, GUIDING: HCPCS | Performed by: SURGERY

## 2024-05-21 PROCEDURE — 041K0JL BYPASS RIGHT FEMORAL ARTERY TO POPLITEAL ARTERY WITH SYNTHETIC SUBSTITUTE, OPEN APPROACH: ICD-10-PCS | Performed by: SURGERY

## 2024-05-21 PROCEDURE — 25000003 PHARM REV CODE 250: Performed by: SURGERY

## 2024-05-21 PROCEDURE — 80048 BASIC METABOLIC PNL TOTAL CA: CPT | Mod: 91

## 2024-05-21 PROCEDURE — D9220A PRA ANESTHESIA: Mod: AA,GC,, | Performed by: ANESTHESIOLOGY

## 2024-05-21 PROCEDURE — L8670 VASCULAR GRAFT, SYNTHETIC: HCPCS | Performed by: SURGERY

## 2024-05-21 PROCEDURE — 94761 N-INVAS EAR/PLS OXIMETRY MLT: CPT

## 2024-05-21 DEVICE — PROPATEN VASCULAR GRAFT TW RR 8MMX40CM 30CM RINGS HEPARIN
Type: IMPLANTABLE DEVICE | Site: LEG | Status: FUNCTIONAL
Brand: GORE PROPATEN VASCULAR GRAFT

## 2024-05-21 RX ORDER — MAGNESIUM SULFATE HEPTAHYDRATE 40 MG/ML
2 INJECTION, SOLUTION INTRAVENOUS
Status: DISCONTINUED | OUTPATIENT
Start: 2024-05-21 | End: 2024-05-22

## 2024-05-21 RX ORDER — SODIUM CHLORIDE 9 MG/ML
INJECTION, SOLUTION INTRAVENOUS CONTINUOUS
Status: DISCONTINUED | OUTPATIENT
Start: 2024-05-21 | End: 2024-05-21

## 2024-05-21 RX ORDER — ONDANSETRON HYDROCHLORIDE 2 MG/ML
INJECTION, SOLUTION INTRAVENOUS
Status: DISCONTINUED | OUTPATIENT
Start: 2024-05-21 | End: 2024-05-21

## 2024-05-21 RX ORDER — HEPARIN SODIUM 1000 [USP'U]/ML
INJECTION, SOLUTION INTRAVENOUS; SUBCUTANEOUS
Status: DISCONTINUED | OUTPATIENT
Start: 2024-05-21 | End: 2024-05-21

## 2024-05-21 RX ORDER — FENTANYL CITRATE 50 UG/ML
INJECTION, SOLUTION INTRAMUSCULAR; INTRAVENOUS
Status: DISCONTINUED | OUTPATIENT
Start: 2024-05-21 | End: 2024-05-21

## 2024-05-21 RX ORDER — PHENYLEPHRINE HCL IN 0.9% NACL 1 MG/10 ML
SYRINGE (ML) INTRAVENOUS
Status: DISCONTINUED | OUTPATIENT
Start: 2024-05-21 | End: 2024-05-21

## 2024-05-21 RX ORDER — HEPARIN SODIUM 1000 [USP'U]/ML
INJECTION, SOLUTION INTRAVENOUS; SUBCUTANEOUS
Status: DISCONTINUED | OUTPATIENT
Start: 2024-05-21 | End: 2024-05-21 | Stop reason: HOSPADM

## 2024-05-21 RX ORDER — HYDROCODONE BITARTRATE AND ACETAMINOPHEN 5; 325 MG/1; MG/1
1 TABLET ORAL EVERY 4 HOURS PRN
Status: DISCONTINUED | OUTPATIENT
Start: 2024-05-21 | End: 2024-05-23 | Stop reason: HOSPADM

## 2024-05-21 RX ORDER — AMLODIPINE BESYLATE 5 MG/1
5 TABLET ORAL DAILY
Status: DISCONTINUED | OUTPATIENT
Start: 2024-05-21 | End: 2024-05-23 | Stop reason: HOSPADM

## 2024-05-21 RX ORDER — DEXAMETHASONE SODIUM PHOSPHATE 4 MG/ML
INJECTION, SOLUTION INTRA-ARTICULAR; INTRALESIONAL; INTRAMUSCULAR; INTRAVENOUS; SOFT TISSUE
Status: DISCONTINUED | OUTPATIENT
Start: 2024-05-21 | End: 2024-05-21

## 2024-05-21 RX ORDER — POTASSIUM CHLORIDE 7.45 MG/ML
80 INJECTION INTRAVENOUS
Status: DISCONTINUED | OUTPATIENT
Start: 2024-05-21 | End: 2024-05-22

## 2024-05-21 RX ORDER — LIDOCAINE HYDROCHLORIDE 10 MG/ML
INJECTION, SOLUTION EPIDURAL; INFILTRATION; INTRACAUDAL; PERINEURAL
Status: DISPENSED
Start: 2024-05-21 | End: 2024-05-21

## 2024-05-21 RX ORDER — ROCURONIUM BROMIDE 10 MG/ML
INJECTION, SOLUTION INTRAVENOUS
Status: DISCONTINUED | OUTPATIENT
Start: 2024-05-21 | End: 2024-05-21

## 2024-05-21 RX ORDER — GABAPENTIN 300 MG/1
300 CAPSULE ORAL 3 TIMES DAILY
Status: DISCONTINUED | OUTPATIENT
Start: 2024-05-21 | End: 2024-05-23 | Stop reason: HOSPADM

## 2024-05-21 RX ORDER — FENTANYL CITRATE 50 UG/ML
25 INJECTION, SOLUTION INTRAMUSCULAR; INTRAVENOUS EVERY 5 MIN PRN
Status: DISCONTINUED | OUTPATIENT
Start: 2024-05-21 | End: 2024-05-22 | Stop reason: HOSPADM

## 2024-05-21 RX ORDER — DIPHENHYDRAMINE HYDROCHLORIDE 50 MG/ML
25 INJECTION INTRAMUSCULAR; INTRAVENOUS EVERY 6 HOURS PRN
Status: DISCONTINUED | OUTPATIENT
Start: 2024-05-21 | End: 2024-05-22 | Stop reason: HOSPADM

## 2024-05-21 RX ORDER — HYDROMORPHONE HYDROCHLORIDE 1 MG/ML
0.5 INJECTION, SOLUTION INTRAMUSCULAR; INTRAVENOUS; SUBCUTANEOUS
Status: DISCONTINUED | OUTPATIENT
Start: 2024-05-21 | End: 2024-05-22 | Stop reason: HOSPADM

## 2024-05-21 RX ORDER — ALBUTEROL SULFATE 90 UG/1
2 AEROSOL, METERED RESPIRATORY (INHALATION) EVERY 6 HOURS PRN
Status: DISCONTINUED | OUTPATIENT
Start: 2024-05-21 | End: 2024-05-23 | Stop reason: HOSPADM

## 2024-05-21 RX ORDER — KETAMINE HCL IN 0.9 % NACL 50 MG/5 ML
SYRINGE (ML) INTRAVENOUS
Status: DISCONTINUED | OUTPATIENT
Start: 2024-05-21 | End: 2024-05-21

## 2024-05-21 RX ORDER — EPHEDRINE SULFATE 50 MG/ML
INJECTION, SOLUTION INTRAVENOUS
Status: DISCONTINUED | OUTPATIENT
Start: 2024-05-21 | End: 2024-05-21

## 2024-05-21 RX ORDER — MIDAZOLAM HYDROCHLORIDE 1 MG/ML
INJECTION INTRAMUSCULAR; INTRAVENOUS
Status: DISCONTINUED | OUTPATIENT
Start: 2024-05-21 | End: 2024-05-21

## 2024-05-21 RX ORDER — PROPOFOL 10 MG/ML
VIAL (ML) INTRAVENOUS
Status: DISCONTINUED | OUTPATIENT
Start: 2024-05-21 | End: 2024-05-21

## 2024-05-21 RX ORDER — HALOPERIDOL 5 MG/ML
0.5 INJECTION INTRAMUSCULAR EVERY 10 MIN PRN
Status: DISCONTINUED | OUTPATIENT
Start: 2024-05-21 | End: 2024-05-22 | Stop reason: HOSPADM

## 2024-05-21 RX ORDER — POTASSIUM CHLORIDE 7.45 MG/ML
60 INJECTION INTRAVENOUS
Status: DISCONTINUED | OUTPATIENT
Start: 2024-05-21 | End: 2024-05-22

## 2024-05-21 RX ORDER — SODIUM CHLORIDE 0.9 % (FLUSH) 0.9 %
10 SYRINGE (ML) INJECTION
Status: DISCONTINUED | OUTPATIENT
Start: 2024-05-21 | End: 2024-05-22 | Stop reason: HOSPADM

## 2024-05-21 RX ORDER — OXYCODONE HYDROCHLORIDE 10 MG/1
10 TABLET ORAL EVERY 4 HOURS PRN
Status: DISCONTINUED | OUTPATIENT
Start: 2024-05-21 | End: 2024-05-23 | Stop reason: HOSPADM

## 2024-05-21 RX ORDER — ASPIRIN 81 MG/1
81 TABLET ORAL DAILY
Status: DISCONTINUED | OUTPATIENT
Start: 2024-05-21 | End: 2024-05-23 | Stop reason: HOSPADM

## 2024-05-21 RX ORDER — ONDANSETRON 8 MG/1
8 TABLET, ORALLY DISINTEGRATING ORAL EVERY 8 HOURS PRN
Status: DISCONTINUED | OUTPATIENT
Start: 2024-05-21 | End: 2024-05-23 | Stop reason: HOSPADM

## 2024-05-21 RX ORDER — PROTAMINE SULFATE 10 MG/ML
INJECTION, SOLUTION INTRAVENOUS
Status: DISCONTINUED | OUTPATIENT
Start: 2024-05-21 | End: 2024-05-21

## 2024-05-21 RX ORDER — IODIXANOL 320 MG/ML
INJECTION, SOLUTION INTRAVASCULAR
Status: DISCONTINUED | OUTPATIENT
Start: 2024-05-21 | End: 2024-05-21 | Stop reason: HOSPADM

## 2024-05-21 RX ORDER — POTASSIUM CHLORIDE 7.45 MG/ML
40 INJECTION INTRAVENOUS
Status: DISCONTINUED | OUTPATIENT
Start: 2024-05-21 | End: 2024-05-22

## 2024-05-21 RX ORDER — BACITRACIN ZINC 500 UNIT/G
OINTMENT (GRAM) TOPICAL
Status: DISCONTINUED | OUTPATIENT
Start: 2024-05-21 | End: 2024-05-21 | Stop reason: HOSPADM

## 2024-05-21 RX ORDER — MAGNESIUM SULFATE HEPTAHYDRATE 40 MG/ML
4 INJECTION, SOLUTION INTRAVENOUS
Status: DISCONTINUED | OUTPATIENT
Start: 2024-05-21 | End: 2024-05-22

## 2024-05-21 RX ORDER — FLUTICASONE FUROATE AND VILANTEROL 200; 25 UG/1; UG/1
1 POWDER RESPIRATORY (INHALATION) DAILY
Status: DISCONTINUED | OUTPATIENT
Start: 2024-05-21 | End: 2024-05-23 | Stop reason: HOSPADM

## 2024-05-21 RX ORDER — LIDOCAINE HYDROCHLORIDE 20 MG/ML
INJECTION, SOLUTION EPIDURAL; INFILTRATION; INTRACAUDAL; PERINEURAL
Status: DISCONTINUED | OUTPATIENT
Start: 2024-05-21 | End: 2024-05-21

## 2024-05-21 RX ORDER — SODIUM CHLORIDE, SODIUM LACTATE, POTASSIUM CHLORIDE, CALCIUM CHLORIDE 600; 310; 30; 20 MG/100ML; MG/100ML; MG/100ML; MG/100ML
INJECTION, SOLUTION INTRAVENOUS CONTINUOUS
Status: DISCONTINUED | OUTPATIENT
Start: 2024-05-21 | End: 2024-05-23 | Stop reason: HOSPADM

## 2024-05-21 RX ORDER — ACETAMINOPHEN 500 MG
1000 TABLET ORAL
Status: COMPLETED | OUTPATIENT
Start: 2024-05-21 | End: 2024-05-21

## 2024-05-21 RX ORDER — ATORVASTATIN CALCIUM 40 MG/1
40 TABLET, FILM COATED ORAL DAILY
Status: DISCONTINUED | OUTPATIENT
Start: 2024-05-21 | End: 2024-05-23 | Stop reason: HOSPADM

## 2024-05-21 RX ORDER — BISMUTH SUBSALICYLATE 525 MG/30ML
30 LIQUID ORAL EVERY 6 HOURS PRN
Status: DISCONTINUED | OUTPATIENT
Start: 2024-05-21 | End: 2024-05-23 | Stop reason: HOSPADM

## 2024-05-21 RX ORDER — MUPIROCIN 20 MG/G
OINTMENT TOPICAL 2 TIMES DAILY
Status: DISCONTINUED | OUTPATIENT
Start: 2024-05-21 | End: 2024-05-23 | Stop reason: HOSPADM

## 2024-05-21 RX ADMIN — Medication 100 MCG: at 07:05

## 2024-05-21 RX ADMIN — PROTAMINE SULFATE 55 MG: 10 INJECTION, SOLUTION INTRAVENOUS at 11:05

## 2024-05-21 RX ADMIN — SODIUM CHLORIDE, POTASSIUM CHLORIDE, SODIUM LACTATE AND CALCIUM CHLORIDE: 600; 310; 30; 20 INJECTION, SOLUTION INTRAVENOUS at 01:05

## 2024-05-21 RX ADMIN — DEXAMETHASONE SODIUM PHOSPHATE 8 MG: 4 INJECTION INTRA-ARTICULAR; INTRALESIONAL; INTRAMUSCULAR; INTRAVENOUS; SOFT TISSUE at 07:05

## 2024-05-21 RX ADMIN — Medication 150 MCG: at 09:05

## 2024-05-21 RX ADMIN — FENTANYL CITRATE 25 MCG: 50 INJECTION INTRAMUSCULAR; INTRAVENOUS at 10:05

## 2024-05-21 RX ADMIN — DEXTROSE MONOHYDRATE 2 G: 2.5 INJECTION INTRAVENOUS at 07:05

## 2024-05-21 RX ADMIN — DEXTROSE MONOHYDRATE 2 G: 2.5 INJECTION INTRAVENOUS at 11:05

## 2024-05-21 RX ADMIN — SUGAMMADEX 180 MG: 100 INJECTION, SOLUTION INTRAVENOUS at 12:05

## 2024-05-21 RX ADMIN — MIDAZOLAM 2 MG: 1 INJECTION INTRAMUSCULAR; INTRAVENOUS at 07:05

## 2024-05-21 RX ADMIN — ROCURONIUM BROMIDE 70 MG: 10 INJECTION, SOLUTION INTRAVENOUS at 07:05

## 2024-05-21 RX ADMIN — AMLODIPINE BESYLATE 5 MG: 5 TABLET ORAL at 01:05

## 2024-05-21 RX ADMIN — EPHEDRINE SULFATE 5 MG: 50 INJECTION INTRAVENOUS at 09:05

## 2024-05-21 RX ADMIN — SODIUM CHLORIDE: 0.9 INJECTION, SOLUTION INTRAVENOUS at 06:05

## 2024-05-21 RX ADMIN — FENTANYL CITRATE 25 MCG: 50 INJECTION INTRAMUSCULAR; INTRAVENOUS at 12:05

## 2024-05-21 RX ADMIN — ROCURONIUM BROMIDE 20 MG: 10 INJECTION, SOLUTION INTRAVENOUS at 09:05

## 2024-05-21 RX ADMIN — Medication 100 MCG: at 08:05

## 2024-05-21 RX ADMIN — PROPOFOL 30 MG: 10 INJECTION, EMULSION INTRAVENOUS at 10:05

## 2024-05-21 RX ADMIN — ROCURONIUM BROMIDE 10 MG: 10 INJECTION, SOLUTION INTRAVENOUS at 11:05

## 2024-05-21 RX ADMIN — FENTANYL CITRATE 200 MCG: 50 INJECTION INTRAMUSCULAR; INTRAVENOUS at 07:05

## 2024-05-21 RX ADMIN — ROCURONIUM BROMIDE 10 MG: 10 INJECTION, SOLUTION INTRAVENOUS at 10:05

## 2024-05-21 RX ADMIN — Medication 150 MCG: at 08:05

## 2024-05-21 RX ADMIN — SODIUM CHLORIDE, SODIUM GLUCONATE, SODIUM ACETATE, POTASSIUM CHLORIDE AND MAGNESIUM CHLORIDE: 526; 502; 368; 37; 30 INJECTION, SOLUTION INTRAVENOUS at 07:05

## 2024-05-21 RX ADMIN — ONDANSETRON 4 MG: 2 INJECTION INTRAMUSCULAR; INTRAVENOUS at 11:05

## 2024-05-21 RX ADMIN — HYDROCODONE BITARTRATE AND ACETAMINOPHEN 1 TABLET: 5; 325 TABLET ORAL at 01:05

## 2024-05-21 RX ADMIN — ATORVASTATIN CALCIUM 40 MG: 40 TABLET, FILM COATED ORAL at 01:05

## 2024-05-21 RX ADMIN — LIDOCAINE HYDROCHLORIDE 100 MG: 20 INJECTION, SOLUTION EPIDURAL; INFILTRATION; INTRACAUDAL at 12:05

## 2024-05-21 RX ADMIN — Medication 20 MG: at 09:05

## 2024-05-21 RX ADMIN — PROPOFOL 20 MG: 10 INJECTION, EMULSION INTRAVENOUS at 07:05

## 2024-05-21 RX ADMIN — Medication 15 MG: at 10:05

## 2024-05-21 RX ADMIN — LIDOCAINE HYDROCHLORIDE 90 MG: 20 INJECTION, SOLUTION EPIDURAL; INFILTRATION; INTRACAUDAL at 08:05

## 2024-05-21 RX ADMIN — BISMUTH SUBSALICYLATE 30 ML: 525 LIQUID ORAL at 09:05

## 2024-05-21 RX ADMIN — ASPIRIN 81 MG: 81 TABLET, COATED ORAL at 01:05

## 2024-05-21 RX ADMIN — PROTAMINE SULFATE 5 MG: 10 INJECTION, SOLUTION INTRAVENOUS at 11:05

## 2024-05-21 RX ADMIN — HEPARIN SODIUM 10000 UNITS: 1000 INJECTION, SOLUTION INTRAVENOUS; SUBCUTANEOUS at 10:05

## 2024-05-21 RX ADMIN — HEPARIN SODIUM 2000 UNITS: 1000 INJECTION, SOLUTION INTRAVENOUS; SUBCUTANEOUS at 11:05

## 2024-05-21 RX ADMIN — ROCURONIUM BROMIDE 10 MG: 10 INJECTION, SOLUTION INTRAVENOUS at 08:05

## 2024-05-21 RX ADMIN — MUPIROCIN: 20 OINTMENT TOPICAL at 08:05

## 2024-05-21 RX ADMIN — PROPOFOL 100 MG: 10 INJECTION, EMULSION INTRAVENOUS at 07:05

## 2024-05-21 RX ADMIN — GABAPENTIN 300 MG: 300 CAPSULE ORAL at 08:05

## 2024-05-21 RX ADMIN — ACETAMINOPHEN 1000 MG: 500 TABLET ORAL at 06:05

## 2024-05-21 RX ADMIN — EPHEDRINE SULFATE 5 MG: 50 INJECTION INTRAVENOUS at 08:05

## 2024-05-21 RX ADMIN — ROCURONIUM BROMIDE 15 MG: 10 INJECTION, SOLUTION INTRAVENOUS at 09:05

## 2024-05-21 RX ADMIN — CEFAZOLIN 2 G: 2 INJECTION, POWDER, FOR SOLUTION INTRAMUSCULAR; INTRAVENOUS at 07:05

## 2024-05-21 RX ADMIN — SODIUM CHLORIDE, SODIUM GLUCONATE, SODIUM ACETATE, POTASSIUM CHLORIDE AND MAGNESIUM CHLORIDE: 526; 502; 368; 37; 30 INJECTION, SOLUTION INTRAVENOUS at 09:05

## 2024-05-21 RX ADMIN — ROCURONIUM BROMIDE 20 MG: 10 INJECTION, SOLUTION INTRAVENOUS at 08:05

## 2024-05-21 RX ADMIN — LIDOCAINE HYDROCHLORIDE 100 MG: 20 INJECTION, SOLUTION EPIDURAL; INFILTRATION; INTRACAUDAL at 07:05

## 2024-05-21 RX ADMIN — GABAPENTIN 300 MG: 300 CAPSULE ORAL at 04:05

## 2024-05-21 RX ADMIN — Medication 15 MG: at 11:05

## 2024-05-21 RX ADMIN — PROPOFOL 20 MG: 10 INJECTION, EMULSION INTRAVENOUS at 12:05

## 2024-05-21 RX ADMIN — FENTANYL CITRATE 50 MCG: 50 INJECTION INTRAMUSCULAR; INTRAVENOUS at 12:05

## 2024-05-21 RX ADMIN — SODIUM CHLORIDE, POTASSIUM CHLORIDE, SODIUM LACTATE AND CALCIUM CHLORIDE: 600; 310; 30; 20 INJECTION, SOLUTION INTRAVENOUS at 11:05

## 2024-05-21 RX ADMIN — PROPOFOL 30 MG: 10 INJECTION, EMULSION INTRAVENOUS at 08:05

## 2024-05-21 NOTE — HPI
Tan Gilliam is a 69 y.o. 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 The patient presents to the SICU s/p right femoral to popliteal bypass with Dr. Childers on 5/21.     On admission, they are in stable condition. Goals to maintain blood pressure at a MAP >60 and SBP < 160.

## 2024-05-21 NOTE — BRIEF OP NOTE
[facility] - Surgery ([facility])  Brief Operative Note    SUMMARY     Surgery Date: 5/21/2024     Surgeons and Role:     * MOI Childers II, MD - Primary     * Oren Grullon MD - Resident - Assisting     * Joe Michael MD - Fellow        Pre-op Diagnosis:  Femoral artery aneurysm [I72.4]    Post-op Diagnosis:  Post-Op Diagnosis Codes:     * Femoral artery aneurysm [I72.4]    Procedure(s) (LRB):  CREATION, BYPASS, ARTERIAL, FEMORAL TO POPLITEAL, USING GRAFT (Right)  - 8mm [place] ePTFE  ANGIOGRAM - right lower extremity (Right)    Anesthesia: General    Implants:  Implant Name Type Inv. Item Serial No.  Lot No. LRB No. Used Action   GRAFT THIN WALL 8 X 40CM - [id number]  GRAFT THIN WALL 8 X 40CM [id number] [employer]  Right 1 Implanted       Operative Findings: single runoff to foot via peroneal artery, bypass from mid SFA to BK Pop, ligated intervening artery.     Estimated Blood Loss: 50cc         Specimens:   Specimen (24h ago, onward)      None            [id number]

## 2024-05-21 NOTE — ANESTHESIA PROCEDURE NOTES
Intubation    Date/Time: 5/21/2024 7:13 AM    Performed by: Emmett Beck MD  Authorized by: Sondra Escobar MD    Intubation:     Induction:  Intravenous    Intubated:  Postinduction    Mask Ventilation:  Moderately difficult with oral airway (difficult to obtain good seal with edentulous mouth)    Attempts:  1    Attempted By:  Resident anesthesiologist    Method of Intubation:  Direct    Blade:  Casanova 2    Laryngeal View Grade: Grade I - full view of cords      Difficult Airway Encountered?: No      Complications:  None    Airway Device:  Oral endotracheal tube    Airway Device Size:  7.5    Style/Cuff Inflation:  Cuffed (inflated to minimal occlusive pressure)    Tube secured:  22    Secured at:  The lips    Placement Verified By:  Capnometry    Complicating Factors:  None    Findings Post-Intubation:  BS equal bilateral and atraumatic/condition of teeth unchanged

## 2024-05-21 NOTE — PROGRESS NOTES
Patient was prepared for surgery with the help of the video remote  and translation from son. The patient seemed a little confused with some of the questions stated by  and gave unclear answers until his son clarified with him-then he was able to  answer appropriately.     Med list will need to be reviewed with patent's doctor. List was update for known meds- he did not know about other meds.

## 2024-05-21 NOTE — ASSESSMENT & PLAN NOTE
"Tan Gilliam is a 69 y.o. 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 The patient presents to the SICU s/p right femoral to popliteal bypass with Dr. Childers on 5/21.       Neuro/Psych:   -- Sedation: none  -- Pain:   -- Scheduled Tylenol 1g q8h, gabapentin 300mg TID   -- Oxy PRN  -- Hx of CVA: Xarelto 20mg             Cardiac:   -- S/P femoral to popliteal bypass with Dr. Childers on 5/21  -- BP Goal: SBP <160  -- Anti-HTNs: amlodipine 5mg daily  -- Rhythm: NSR  -- Statin: Atorvastatin 40 mg QD  -- Hx of HTN: holding home lisinopril 40mg, coreg 6.25mg      Pulmonary:   -- Goal SpO2 >92%  -- ABGs PRN  -- Asthma: home Trelegy, albuterol  -- Former smoker (50+ pack yr hx)     Renal:  -- Trend BUN/Cr   -- Maintain Li, record strict Is/Os    No results for input(s): "BUN", "CREATININE" in the last 168 hours.      FEN / GI:   -- Daily CMP, PRN K/Mag/Phos per protocol   -- Replace electrolytes as needed  -- Nutrition: regular  -- Bowel Regimen: Miralax, docusate      ID:   -- Afebrile  -- WBC stable  -- Abx: Complete perioperative cefazolin 2g Q8H x 5 doses    No results for input(s): "WBC" in the last 168 hours.      Heme/Onc:   -- CBC daily  -- ASA 81mg daily    No results for input(s): "HGB", "PLT", "APTT", "INR" in the last 168 hours.      Endocrine/Rheum:   -- Psoriatic arthritis (on Humira)       PPx:   Feeding: regular  Analgesia/Sedation: see above   Thromboembolic Prevention: SDCs  HOB >30: n/a  Stress Ulcer: n/a  Invasive Lines/Drains/Airway:   Left radial arterial line  Jen    Deescalation:      Dispo/Code Status/Palliative:     - Continue SICU Care    - Full Code    "

## 2024-05-21 NOTE — ANESTHESIA PROCEDURE NOTES
Left Radial Arterial Line    Diagnosis: Popliteal artery aneurysm  Doctor requesting consult: MOI Childers II, MD    Patient location during procedure: done in OR  Timeout: 5/21/2024 7:16 AM  Procedure end time: 5/21/2024 7:19 AM    Staffing  Authorizing Provider: Sondra Escobar MD  Performing Provider: Emmett Beck MD    Staffing  Performed by: Emmett Beck MD  Authorized by: Sondra Escobar MD    Anesthesiologist was present at the time of the procedure.    Preanesthetic Checklist  Completed: patient identified, IV checked, risks and benefits discussed, surgical consent, monitors and equipment checked, pre-op evaluation, timeout performed and anesthesia consent givenLeft Radial Arterial Line  Skin Prep: chlorhexidine gluconate  Local Infiltration: none  Orientation: left  Location: radial    Catheter Size: 20 G  Catheter placement by Anatomical landmarks. Heme positive aspiration all ports. Insertion Attempts: 1  Assessment  Dressing: secured with tape and tegaderm  Patient: Tolerated well              This is a 19 yr M, no pmh with c/o anxiety. Pt reports he is anxious and smokes marijuana, his mother does not like that. She is arguing with him about money all the time. He works and he gives rent money but this month he is late with the insurance. He reports works at [employer], sleeps alright, eats, drinks, able to go to work. Currently on antidepressant but doesn't not know the name, and does not know what is the doctor's name who prescribe the medication. Explicitly dentis si, hi, ah,vh.

## 2024-05-21 NOTE — TRANSFER OF CARE
"Anesthesia Transfer of Care Note    Patient: Tan Gilliam    Procedure(s) Performed: Procedure(s) (LRB):  CREATION, BYPASS, ARTERIAL, FEMORAL TO POPLITEAL, USING GRAFT (Right)  ANGIOGRAM - right lower extremity (Right)    Patient location: PACU    Anesthesia Type: general    Transport from OR: Transported from OR on 6-10 L/min O2 by face mask with adequate spontaneous ventilation    Post pain: adequate analgesia    Post assessment: no apparent anesthetic complications and tolerated procedure well    Post vital signs: stable    Level of consciousness: sedated and responds to stimulation    Nausea/Vomiting: no nausea/vomiting    Complications: none    Transfer of care protocol was followed      Last vitals: Visit Vitals  /71   Pulse 78   Temp 36.6 °C (97.8 °F) (Oral)   Resp (!) 22   Ht 5' 10" (1.778 m)   Wt 87.1 kg (192 lb)   SpO2 98%   BMI 27.55 kg/m²     "

## 2024-05-21 NOTE — SUBJECTIVE & OBJECTIVE
Follow-up For: Procedure(s) (LRB):  CREATION, BYPASS, ARTERIAL, FEMORAL TO POPLITEAL, USING GRAFT (Right)  ANGIOGRAM - right lower extremity (Right)    Post-Operative Day: Day of Surgery     Past Medical History:   Diagnosis Date    Asthma     CVA (cerebral vascular accident)     Hypertension        Past Surgical History:   Procedure Laterality Date    COLONOSCOPY N/A 2019    Procedure: COLONOSCOPY;  Surgeon: Luis Bogran-Reyes, MD;  Location: [facility];  Service: Endoscopy;  Laterality: N/A;    HEMORRHOID SURGERY      NOSE SURGERY      TONSILLECTOMY         Review of patient's allergies indicates:   Allergen Reactions    Influenza virus vaccines Other (See Comments)       Family History       Problem Relation (Age of Onset)    Asthma Mother    Heart disease Mother    Hypertension Sister, Sister    No Known Problems Father          Tobacco Use    Smoking status: Former     Current packs/day: 0.00     Types: Cigarettes, Cigars     Quit date:      Years since quittin.4    Smokeless tobacco: Never   Substance and Sexual Activity    Alcohol use: Yes     Alcohol/week: 6.0 standard drinks of alcohol     Types: 6 Cans of beer per week    Drug use: Never    Sexual activity: Not Currently      Review of Systems   Unable to perform ROS: Other (sedated s/p bypass)     Objective:     Vital Signs (Most Recent):  Temp: 97.8 °F (36.6 °C) (24)  Pulse: 63 (24)  Resp: 20 (24)  BP: 135/80 (24)  SpO2: 98 % (24) Vital Signs (24h Range):  Temp:  [97.8 °F (36.6 °C)] 97.8 °F (36.6 °C)  Pulse:  [63] 63  Resp:  [20] 20  SpO2:  [98 %] 98 %  BP: (135)/(80) 135/80     Weight: 87.1 kg (192 lb)  Body mass index is 27.55 kg/m².      Intake/Output Summary (Last 24 hours) at 2024 1107  Last data filed at 2024 1100  Gross per 24 hour   Intake 2060 ml   Output 550 ml   Net 1510 ml          Physical Exam  Vitals and nursing note reviewed.   Constitutional:       General: He  "is not in acute distress.     Appearance: Normal appearance. He is normal weight.   HENT:      Mouth/Throat:      Mouth: Mucous membranes are moist.      Pharynx: Oropharynx is clear.   Eyes:      Pupils: Pupils are equal, round, and reactive to light.   Cardiovascular:      Rate and Rhythm: Normal rate and regular rhythm.   Pulmonary:      Effort: Pulmonary effort is normal.      Breath sounds: Normal breath sounds.   Abdominal:      General: Abdomen is flat. Bowel sounds are normal.      Palpations: Abdomen is soft.   Genitourinary:     Comments: thompson  Skin:     General: Skin is warm and dry.      Capillary Refill: Capillary refill takes less than 2 seconds.            Vents:       Lines/Drains/Airways       Drain  Duration                  Urethral Catheter 05/21/24 0715 Straight-tip 16 Fr. <1 day              Airway  Duration                  Airway - Non-Surgical 05/21/24 0713 <1 day              Arterial Line  Duration             Arterial Line 05/21/24 0717 Left Radial <1 day              Peripheral Intravenous Line  Duration                  Peripheral IV - Single Lumen 05/21/24 0558 18 G Anterior;Left Hand <1 day         Peripheral IV - Single Lumen 05/21/24 0715 18 G Right Forearm <1 day                    Significant Labs:    CBC/Anemia Profile:  No results for input(s): "WBC", "HGB", "HCT", "PLT", "MCV", "RDW", "IRON", "FERRITIN", "RETIC", "FOLATE", "[id number]", "OCCULTBLOOD" in the last 48 hours.     Chemistries:  No results for input(s): "NA", "K", "CL", "CO2", "BUN", "CREATININE", "CALCIUM", "ALBUMIN", "PROT", "BILITOT", "ALKPHOS", "ALT", "AST", "GLUCOSE", "MG", "PHOS" in the last 48 hours.    All pertinent labs within the past 24 hours have been reviewed.    Significant Imaging: I have reviewed all pertinent imaging results/findings within the past 24 hours.  "

## 2024-05-21 NOTE — H&P
[facility] - Surgery ([facility])  Critical Care - Surgery  History & Physical    Patient Name: Tan Gilliam  MRN: [id number]  Admission Date: 2024  Code Status: Full Code  Attending Physician: MOI Childers II, MD   Primary Care Provider: Cory Che MD   Principal Problem: Femoral artery aneurysm    Subjective:     HPI:  Tan Gilliam is a 69 y.o. 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 The patient presents to the SICU s/p right femoral to popliteal bypass with Dr. Childers on .     On admission, they are in stable condition. Goals to maintain blood pressure at a MAP >60 and SBP < 160.       Hospital/ICU Course:  No notes on file    Follow-up For: Procedure(s) (LRB):  CREATION, BYPASS, ARTERIAL, FEMORAL TO POPLITEAL, USING GRAFT (Right)  ANGIOGRAM - right lower extremity (Right)    Post-Operative Day: Day of Surgery     Past Medical History:   Diagnosis Date    Asthma     CVA (cerebral vascular accident)     Hypertension        Past Surgical History:   Procedure Laterality Date    COLONOSCOPY N/A 2019    Procedure: COLONOSCOPY;  Surgeon: Luis Bogran-Reyes, MD;  Location: [facility];  Service: Endoscopy;  Laterality: N/A;    HEMORRHOID SURGERY      NOSE SURGERY      TONSILLECTOMY         Review of patient's allergies indicates:   Allergen Reactions    Influenza virus vaccines Other (See Comments)       Family History       Problem Relation (Age of Onset)    Asthma Mother    Heart disease Mother    Hypertension Sister, Sister    No Known Problems Father          Tobacco Use    Smoking status: Former     Current packs/day: 0.00     Types: Cigarettes, Cigars     Quit date:      Years since quittin.4    Smokeless tobacco: Never   Substance and Sexual  Activity    Alcohol use: Yes     Alcohol/week: 6.0 standard drinks of alcohol     Types: 6 Cans of beer per week    Drug use: Never    Sexual activity: Not Currently      Review of Systems   Unable to perform ROS: Other (sedated s/p bypass)     Objective:     Vital Signs (Most Recent):  Temp: 97.8 °F (36.6 °C) (05/21/24 0628)  Pulse: 63 (05/21/24 0628)  Resp: 20 (05/21/24 0628)  BP: 135/80 (05/21/24 0628)  SpO2: 98 % (05/21/24 0628) Vital Signs (24h Range):  Temp:  [97.8 °F (36.6 °C)] 97.8 °F (36.6 °C)  Pulse:  [63] 63  Resp:  [20] 20  SpO2:  [98 %] 98 %  BP: (135)/(80) 135/80     Weight: 87.1 kg (192 lb)  Body mass index is 27.55 kg/m².      Intake/Output Summary (Last 24 hours) at 5/21/2024 1107  Last data filed at 5/21/2024 1100  Gross per 24 hour   Intake 2060 ml   Output 550 ml   Net 1510 ml          Physical Exam  Vitals and nursing note reviewed.   Constitutional:       General: He is not in acute distress.     Appearance: Normal appearance. He is normal weight.   HENT:      Mouth/Throat:      Mouth: Mucous membranes are moist.      Pharynx: Oropharynx is clear.   Eyes:      Pupils: Pupils are equal, round, and reactive to light.   Cardiovascular:      Rate and Rhythm: Normal rate and regular rhythm.   Pulmonary:      Effort: Pulmonary effort is normal.      Breath sounds: Normal breath sounds.   Abdominal:      General: Abdomen is flat. Bowel sounds are normal.      Palpations: Abdomen is soft.   Genitourinary:     Comments: thompson  Skin:     General: Skin is warm and dry.      Capillary Refill: Capillary refill takes less than 2 seconds.            Vents:       Lines/Drains/Airways       Drain  Duration                  Urethral Catheter 05/21/24 0715 Straight-tip 16 Fr. <1 day              Airway  Duration                  Airway - Non-Surgical 05/21/24 0713 <1 day              Arterial Line  Duration             Arterial Line 05/21/24 0717 Left Radial <1 day              Peripheral Intravenous Line   "Duration                  Peripheral IV - Single Lumen 05/21/24 0558 18 G Anterior;Left Hand <1 day         Peripheral IV - Single Lumen 05/21/24 0715 18 G Right Forearm <1 day                    Significant Labs:    CBC/Anemia Profile:  No results for input(s): "WBC", "HGB", "HCT", "PLT", "MCV", "RDW", "IRON", "FERRITIN", "RETIC", "FOLATE", "[id number]", "OCCULTBLOOD" in the last 48 hours.     Chemistries:  No results for input(s): "NA", "K", "CL", "CO2", "BUN", "CREATININE", "CALCIUM", "ALBUMIN", "PROT", "BILITOT", "ALKPHOS", "ALT", "AST", "GLUCOSE", "MG", "PHOS" in the last 48 hours.    All pertinent labs within the past 24 hours have been reviewed.    Significant Imaging: I have reviewed all pertinent imaging results/findings within the past 24 hours.  Assessment/Plan:     Cardiac/Vascular  * Femoral artery aneurysm  Tan Gilliam is a 69 y.o. 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 The patient presents to the SICU s/p right femoral to popliteal bypass with Dr. Childers on 5/21.       Neuro/Psych:   -- Sedation: none  -- Pain:   -- Scheduled Tylenol 1g q8h, gabapentin 300mg TID   -- Oxy PRN  -- Hx of CVA: Xarelto 20mg             Cardiac:   -- S/P femoral to popliteal bypass with Dr. Childers on 5/21  -- BP Goal: SBP <160  -- Anti-HTNs: amlodipine 5mg daily  -- Rhythm: NSR  -- Statin: Atorvastatin 40 mg QD  -- Hx of HTN: holding home lisinopril 40mg, coreg 6.25mg      Pulmonary:   -- Goal SpO2 >92%  -- ABGs PRN  -- Asthma: home Trelegy, albuterol  -- Former smoker (50+ pack yr hx)     Renal:  -- Trend BUN/Cr   -- Maintain Li, record strict Is/Os    No results for input(s): "BUN", "CREATININE" in the last 168 hours.      FEN / GI:   -- Daily CMP, PRN K/Mag/Phos per " "protocol   -- Replace electrolytes as needed  -- Nutrition: regular  -- Bowel Regimen: Miralax, docusate      ID:   -- Afebrile  -- WBC stable  -- Abx: Complete perioperative cefazolin 2g Q8H x 5 doses    No results for input(s): "WBC" in the last 168 hours.      Heme/Onc:   -- CBC daily  -- ASA 81mg daily    No results for input(s): "HGB", "PLT", "APTT", "INR" in the last 168 hours.      Endocrine/Rheum:   -- Psoriatic arthritis (on Humira)       PPx:   Feeding: regular  Analgesia/Sedation: see above   Thromboembolic Prevention: SDCs  HOB >30: n/a  Stress Ulcer: n/a  Invasive Lines/Drains/Airway:   Left radial arterial line  Li    Deescalation:      Dispo/Code Status/Palliative:     - Continue SICU Care    - Full Code           Critical care was time spent personally by me on the following activities: development of treatment plan with patient or surrogate and bedside caregivers, discussions with consultants, evaluation of patient's response to treatment, examination of patient, ordering and performing treatments and interventions, ordering and review of laboratory studies, ordering and review of radiographic studies, pulse oximetry, re-evaluation of patient's condition.  This critical care time did not overlap with that of any other provider or involve time for any procedures.     Hina Chu ([place] Name: MD Kaitlynn  Critical Care - Surgery  [facility] - Surgery (2nd Fl)  "

## 2024-05-21 NOTE — INTERVAL H&P NOTE
The patient has been examined and the H&P has been reviewed:    I concur with the findings and no changes have occurred since H&P was written.    Surgery risks, benefits and alternative options discussed and understood by patient/family.    RLE marked. Palpable R and L DP pulses marked      There are no hospital problems to display for this patient.

## 2024-05-21 NOTE — ANESTHESIA POSTPROCEDURE EVALUATION
Anesthesia Post Evaluation    Patient: Tan Gilliam    Procedure(s) Performed: Procedure(s) (LRB):  CREATION, BYPASS, ARTERIAL, FEMORAL TO POPLITEAL, USING GRAFT (Right)  ANGIOGRAM - right lower extremity (Right)    Final Anesthesia Type: general      Patient location during evaluation: PACU  Patient participation: Yes- Able to Participate  Level of consciousness: awake and alert  Post-procedure vital signs: reviewed and stable  Pain management: adequate  Airway patency: patent    PONV status at discharge: No PONV  Anesthetic complications: no      Cardiovascular status: blood pressure returned to baseline  Respiratory status: unassisted  Hydration status: euvolemic  Follow-up not needed.              Vitals Value Taken Time   /81 05/21/24 1402   Temp 36.8 °C (98.2 °F) 05/21/24 1315   Pulse 73 05/21/24 1422   Resp 22 05/21/24 1422   SpO2 96 % 05/21/24 1422   Vitals shown include unfiled device data.      No case tracking events are documented in the log.      Pain/Karo Score: Pain Rating Prior to Med Admin: 6 (5/21/2024  1:24 PM)  Pain Rating Post Med Admin: 4 (5/21/2024  1:57 PM)

## 2024-05-22 LAB
ANION GAP SERPL CALC-SCNC: 10 MMOL/L (ref 8–16)
BASOPHILS # BLD AUTO: 0.04 K/UL (ref 0–0.2)
BASOPHILS NFR BLD: 0.3 % (ref 0–1.9)
BUN SERPL-MCNC: 17 MG/DL (ref 8–23)
CALCIUM SERPL-MCNC: 9.1 MG/DL (ref 8.7–10.5)
CHLORIDE SERPL-SCNC: 108 MMOL/L (ref 95–110)
CO2 SERPL-SCNC: 24 MMOL/L (ref 23–29)
CREAT SERPL-MCNC: 1.3 MG/DL (ref 0.5–1.4)
DIFFERENTIAL METHOD BLD: ABNORMAL
EOSINOPHIL # BLD AUTO: 0.1 K/UL (ref 0–0.5)
EOSINOPHIL NFR BLD: 0.3 % (ref 0–8)
ERYTHROCYTE [DISTWIDTH] IN BLOOD BY AUTOMATED COUNT: 12.8 % (ref 11.5–14.5)
EST. GFR  (NO RACE VARIABLE): 58.7 ML/MIN/1.73 M^2
GLUCOSE SERPL-MCNC: 144 MG/DL (ref 70–110)
HCT VFR BLD AUTO: 41.4 % (ref 40–54)
HGB BLD-MCNC: 13.5 G/DL (ref 14–18)
IMM GRANULOCYTES # BLD AUTO: 0.05 K/UL (ref 0–0.04)
IMM GRANULOCYTES NFR BLD AUTO: 0.3 % (ref 0–0.5)
LYMPHOCYTES # BLD AUTO: 3.1 K/UL (ref 1–4.8)
LYMPHOCYTES NFR BLD: 20.9 % (ref 18–48)
MAGNESIUM SERPL-MCNC: 2.3 MG/DL (ref 1.6–2.6)
MCH RBC QN AUTO: 29.7 PG (ref 27–31)
MCHC RBC AUTO-ENTMCNC: 32.6 G/DL (ref 32–36)
MCV RBC AUTO: 91 FL (ref 82–98)
MONOCYTES # BLD AUTO: 1.3 K/UL (ref 0.3–1)
MONOCYTES NFR BLD: 8.7 % (ref 4–15)
NEUTROPHILS # BLD AUTO: 10.2 K/UL (ref 1.8–7.7)
NEUTROPHILS NFR BLD: 69.5 % (ref 38–73)
NRBC BLD-RTO: 0 /100 WBC
OHS QRS DURATION: 98 MS
OHS QTC CALCULATION: 450 MS
PHOSPHATE SERPL-MCNC: 2.4 MG/DL (ref 2.7–4.5)
PLATELET # BLD AUTO: 235 K/UL (ref 150–450)
PMV BLD AUTO: 9.7 FL (ref 9.2–12.9)
POTASSIUM SERPL-SCNC: 4.5 MMOL/L (ref 3.5–5.1)
RBC # BLD AUTO: 4.54 M/UL (ref 4.6–6.2)
SODIUM SERPL-SCNC: 142 MMOL/L (ref 136–145)
WBC # BLD AUTO: 14.61 K/UL (ref 3.9–12.7)

## 2024-05-22 PROCEDURE — 25000003 PHARM REV CODE 250: Performed by: STUDENT IN AN ORGANIZED HEALTH CARE EDUCATION/TRAINING PROGRAM

## 2024-05-22 PROCEDURE — 85025 COMPLETE CBC W/AUTO DIFF WBC: CPT | Performed by: STUDENT IN AN ORGANIZED HEALTH CARE EDUCATION/TRAINING PROGRAM

## 2024-05-22 PROCEDURE — 63600175 PHARM REV CODE 636 W HCPCS: Performed by: STUDENT IN AN ORGANIZED HEALTH CARE EDUCATION/TRAINING PROGRAM

## 2024-05-22 PROCEDURE — 83735 ASSAY OF MAGNESIUM: CPT | Performed by: STUDENT IN AN ORGANIZED HEALTH CARE EDUCATION/TRAINING PROGRAM

## 2024-05-22 PROCEDURE — 25000003 PHARM REV CODE 250

## 2024-05-22 PROCEDURE — 20600001 HC STEP DOWN PRIVATE ROOM

## 2024-05-22 PROCEDURE — 99499 UNLISTED E&M SERVICE: CPT | Mod: GC,,, | Performed by: ANESTHESIOLOGY

## 2024-05-22 PROCEDURE — 84100 ASSAY OF PHOSPHORUS: CPT | Performed by: STUDENT IN AN ORGANIZED HEALTH CARE EDUCATION/TRAINING PROGRAM

## 2024-05-22 PROCEDURE — 36415 COLL VENOUS BLD VENIPUNCTURE: CPT | Performed by: STUDENT IN AN ORGANIZED HEALTH CARE EDUCATION/TRAINING PROGRAM

## 2024-05-22 PROCEDURE — 80048 BASIC METABOLIC PNL TOTAL CA: CPT | Performed by: STUDENT IN AN ORGANIZED HEALTH CARE EDUCATION/TRAINING PROGRAM

## 2024-05-22 PROCEDURE — 25000242 PHARM REV CODE 250 ALT 637 W/ HCPCS

## 2024-05-22 PROCEDURE — 94761 N-INVAS EAR/PLS OXIMETRY MLT: CPT

## 2024-05-22 RX ADMIN — SODIUM CHLORIDE, POTASSIUM CHLORIDE, SODIUM LACTATE AND CALCIUM CHLORIDE: 600; 310; 30; 20 INJECTION, SOLUTION INTRAVENOUS at 09:05

## 2024-05-22 RX ADMIN — OXYCODONE HYDROCHLORIDE 10 MG: 10 TABLET ORAL at 07:05

## 2024-05-22 RX ADMIN — CEFAZOLIN 2 G: 2 INJECTION, POWDER, FOR SOLUTION INTRAMUSCULAR; INTRAVENOUS at 11:05

## 2024-05-22 RX ADMIN — GABAPENTIN 300 MG: 300 CAPSULE ORAL at 03:05

## 2024-05-22 RX ADMIN — SODIUM PHOSPHATE, MONOBASIC, MONOHYDRATE AND SODIUM PHOSPHATE, DIBASIC, ANHYDROUS 15 MMOL: 142; 276 INJECTION, SOLUTION INTRAVENOUS at 12:05

## 2024-05-22 RX ADMIN — HYDROCODONE BITARTRATE AND ACETAMINOPHEN 1 TABLET: 5; 325 TABLET ORAL at 01:05

## 2024-05-22 RX ADMIN — ASPIRIN 81 MG: 81 TABLET, COATED ORAL at 09:05

## 2024-05-22 RX ADMIN — ATORVASTATIN CALCIUM 40 MG: 40 TABLET, FILM COATED ORAL at 09:05

## 2024-05-22 RX ADMIN — CEFAZOLIN 2 G: 2 INJECTION, POWDER, FOR SOLUTION INTRAMUSCULAR; INTRAVENOUS at 03:05

## 2024-05-22 RX ADMIN — AMLODIPINE BESYLATE 5 MG: 5 TABLET ORAL at 09:05

## 2024-05-22 RX ADMIN — GABAPENTIN 300 MG: 300 CAPSULE ORAL at 08:05

## 2024-05-22 RX ADMIN — MUPIROCIN: 20 OINTMENT TOPICAL at 08:05

## 2024-05-22 RX ADMIN — FLUTICASONE FUROATE AND VILANTEROL TRIFENATATE 1 PUFF: 200; 25 POWDER RESPIRATORY (INHALATION) at 09:05

## 2024-05-22 RX ADMIN — GABAPENTIN 300 MG: 300 CAPSULE ORAL at 09:05

## 2024-05-22 NOTE — PROGRESS NOTES
[facility] ([facility])  Vascular Surgery  Progress Note    Patient Name: Tan Gilliam  MRN: [id number]  Admission Date: 5/21/2024  Primary Care Provider: Cory Che MD    Subjective:     Interval History: No acute issues overnight. Afebrile. VSS  Pain controlled well  no nausea / vomiting  Making adequate urine  DP/PT 2+       Post-Op Info:  Procedure(s) (LRB):  CREATION, BYPASS, ARTERIAL, FEMORAL TO POPLITEAL, USING GRAFT (Right)  ANGIOGRAM - right lower extremity (Right)   1 Day Post-Op     Medications:  Continuous Infusions:   lactated ringers   Intravenous Continuous 125 mL/hr at 05/22/24 0950 New Bag at 05/22/24 0950     Scheduled Meds:   amLODIPine  5 mg Oral Daily    aspirin  81 mg Oral Daily    atorvastatin  40 mg Oral Daily    ceFAZolin (Ancef) IV (PEDS and ADULTS)  2 g Intravenous Q8H    fluticasone furoate-vilanteroL  1 puff Inhalation Daily    gabapentin  300 mg Oral TID    mupirocin   Nasal BID     PRN Meds:  Current Facility-Administered Medications:     albuterol, 2 puff, Inhalation, Q6H PRN    bismuth subsalicylate, 30 mL, Oral, Q6H PRN    calcium gluconate IVPB, 1 g, Intravenous, PRN    calcium gluconate IVPB, 1 g, Intravenous, PRN    calcium gluconate IVPB, 1 g, Intravenous, PRN    diphenhydrAMINE, 25 mg, Intravenous, Q6H PRN    fentaNYL, 25 mcg, Intravenous, Q5 Min PRN    haloperidol lactate, 0.5 mg, Intravenous, Q10 Min PRN    HYDROcodone-acetaminophen, 1 tablet, Oral, Q4H PRN    HYDROmorphone, 0.5 mg, Intravenous, Q15 Min PRN    magnesium sulfate IVPB, 2 g, Intravenous, PRN    magnesium sulfate IVPB, 4 g, Intravenous, PRN    ondansetron, 8 mg, Oral, Q8H PRN    oxyCODONE, 10 mg, Oral, Q4H PRN    potassium chloride, 40 mEq, Intravenous, PRN **AND** potassium chloride, 60 mEq, Intravenous, PRN **AND** potassium chloride, 80 mEq, Intravenous, PRN    sodium chloride 0.9%, 10 mL, Intravenous, PRN    sodium phosphate 15 mmol in dextrose 5 % (D5W) 250 mL IVPB, 15 mmol, Intravenous, PRN     sodium phosphate 20.01 mmol in dextrose 5 % (D5W) 250 mL IVPB, 20.01 mmol, Intravenous, PRN    sodium phosphate 30 mmol in dextrose 5 % (D5W) 250 mL IVPB, 30 mmol, Intravenous, PRN     Objective:     Vital Signs (Most Recent):  Temp: 98.2 °F (36.8 °C) (05/22/24 0707)  Pulse: 64 (05/22/24 0942)  Resp: 18 (05/22/24 0942)  BP: 118/65 (05/22/24 0942)  SpO2: 97 % (05/22/24 0942) Vital Signs (24h Range):  Temp:  [98.1 °F (36.7 °C)-98.8 °F (37.1 °C)] 98.2 °F (36.8 °C)  Pulse:  [59-79] 64  Resp:  [16-26] 18  SpO2:  [91 %-99 %] 97 %  BP: ()/(52-84) 118/65  Arterial Line BP: ()/(39-79) 117/51     Date 05/22/24 0700 - 05/23/24 0659   Shift 6019-6485 5246-0613 7489-5138 24 Hour Total   INTAKE   Shift Total(mL/kg)       OUTPUT   Urine(mL/kg/hr) 525   525   Shift Total(mL/kg) 525(6)   525(6)   Weight (kg) 87.1 87.1 87.1 87.1        Physical Exam  Vitals and nursing note reviewed.   Constitutional:       General: He is not in acute distress.     Appearance: He is not toxic-appearing.   HENT:      Head: Normocephalic and atraumatic.   Cardiovascular:      Rate and Rhythm: Normal rate and regular rhythm.      Pulses:           Dorsalis pedis pulses are 2+ on the right side and 2+ on the left side.        Posterior tibial pulses are 2+ on the right side and 2+ on the left side.   Pulmonary:      Effort: Pulmonary effort is normal. No respiratory distress.   Abdominal:      General: Abdomen is flat.      Palpations: Abdomen is soft.   Skin:     General: Skin is warm and dry.      Comments: RLE incisions cdi with minimal strike through on dressings   Neurological:      General: No focal deficit present.      Mental Status: He is alert and oriented to person, place, and time.          Significant Labs:  CBC:   Recent Labs   Lab 05/21/24  1243   WBC 8.56  8.56   RBC 4.33*  4.33*   HGB 12.9*  12.9*   HCT 37.9*  37.9*     218   MCV 88  88   MCH 29.8  29.8   MCHC 34.0  34.0     CMP:   Recent Labs   Lab  05/21/24  2248   *   CALCIUM 8.9      K 4.4   CO2 25      BUN 13   CREATININE 1.2     All pertinent labs from the last 24 hours have been reviewed.    Significant Diagnostics:  I have reviewed all pertinent imaging results/findings within the past 24 hours.  Assessment/Plan:     * Femoral artery aneurysm  Tan Gilliam is a 69 y.o. s/p CREATION, BYPASS, ARTERIAL, FEMORAL TO POPLITEAL, USING GRAFT (Right), ANGIOGRAM - right lower extremity (Right) 1 Day Post-Op    - Regular diet  - mIVF  - Multi-modal pain control  - ASA/Statin  - Remove thompson/arterial line  - Q4 hour NV checks  - Bowel regimen  - Aggressive pulmonary toilet   - OOBTC/Ambulate  - DVT prophylaxis   - Replace electrolytes as needed    Dispo: okay for step down from the ICU to floor           Oren Grullon MD  Vascular Surgery  [facility] - [facility] ([facility])

## 2024-05-22 NOTE — ASSESSMENT & PLAN NOTE
Tan Gilliam is a 69 y.o. 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 The patient presents to the SICU s/p right femoral to popliteal bypass with Dr. Childers on 5/21.       Neuro/Psych:   -- Sedation: none  -- Pain:   -- Scheduled Tylenol 1g q8h, gabapentin 300mg TID   -- Oxy PRN  -- Hx of CVA: Xarelto 20mg             Cardiac:   -- S/P femoral to popliteal bypass with Dr. Childers on 5/21  -- BP Goal: SBP <160  -- Anti-HTNs: amlodipine 5mg daily  -- Rhythm: NSR  -- Statin: Atorvastatin 40 mg QD  -- Hx of HTN: holding home lisinopril 40mg, coreg 6.25mg      Pulmonary:   -- Goal SpO2 >92%  -- ABGs PRN  -- Asthma: home Trelegy, albuterol  -- Former smoker (50+ pack yr hx)     Renal:  -- Trend BUN/Cr   -- Maintain Li, record strict Is/Os    Recent Labs   Lab 05/21/24  1243 05/21/24  2248   BUN 11  11 13   CREATININE 1.0  1.0 1.2         FEN / GI:   -- Daily CMP, PRN K/Mag/Phos per protocol   -- Replace electrolytes as needed  -- Nutrition: regular  -- Bowel Regimen: Miralax, docusate      ID:   -- Afebrile  -- WBC stable  -- Abx: Complete perioperative cefazolin 2g Q8H x 5 doses    Recent Labs   Lab 05/21/24  1243   WBC 8.56  8.56         Heme/Onc:   -- CBC daily  -- ASA 81mg daily    Recent Labs   Lab 05/21/24  1243   HGB 12.9*  12.9*     218         Endocrine/Rheum:   -- Psoriatic arthritis (on Humira)       PPx:   Feeding: regular  Analgesia/Sedation: see above   Thromboembolic Prevention: SDCs  HOB >30: n/a  Stress Ulcer: n/a  Invasive Lines/Drains/Airway:   Left radial arterial line  Jen    Deescalation:      Dispo/Code Status/Palliative:     - Continue SICU Care    - Full Code

## 2024-05-22 NOTE — SUBJECTIVE & OBJECTIVE
Interval History/Significant Events: Chemistries, EKG for frequent PVCs. Pain controlled well. DP/PT 2+. Stable for step down to floor per Vascular.    Follow-up For: Procedure(s) (LRB):  CREATION, BYPASS, ARTERIAL, FEMORAL TO POPLITEAL, USING GRAFT (Right)  ANGIOGRAM - right lower extremity (Right)    Post-Operative Day: 1 Day Post-Op    Objective:     Vital Signs (Most Recent):  Temp: 98.1 °F (36.7 °C) (05/22/24 0300)  Pulse: 67 (05/22/24 0500)  Resp: (!) 22 (05/22/24 0500)  BP: (!) 98/57 (05/22/24 0300)  SpO2: 95 % (05/22/24 0500) Vital Signs (24h Range):  Temp:  [97.8 °F (36.6 °C)-98.8 °F (37.1 °C)] 98.1 °F (36.7 °C)  Pulse:  [61-79] 67  Resp:  [16-26] 22  SpO2:  [91 %-99 %] 95 %  BP: ()/(52-84) 98/57  Arterial Line BP: ()/(39-79) 109/55     Weight: 87.1 kg (192 lb)  Body mass index is 27.55 kg/m².      Intake/Output Summary (Last 24 hours) at 5/22/2024 0558  Last data filed at 5/22/2024 0500  Gross per 24 hour   Intake 2670 ml   Output 2380 ml   Net 290 ml          Physical Exam  Vitals and nursing note reviewed.   Constitutional:       General: He is not in acute distress.     Appearance: Normal appearance. He is normal weight.   HENT:      Mouth/Throat:      Mouth: Mucous membranes are moist.      Pharynx: Oropharynx is clear.   Eyes:      Pupils: Pupils are equal, round, and reactive to light.   Cardiovascular:      Rate and Rhythm: Normal rate and regular rhythm.   Pulmonary:      Effort: Pulmonary effort is normal.      Breath sounds: Normal breath sounds.   Abdominal:      General: Abdomen is flat. Bowel sounds are normal.      Palpations: Abdomen is soft.   Genitourinary:     Comments: thompson  Skin:     General: Skin is warm and dry.      Capillary Refill: Capillary refill takes less than 2 seconds.            Vents:       Lines/Drains/Airways       Drain  Duration                  Urethral Catheter 05/21/24 0715 Straight-tip 16 Fr. <1 day              Arterial Line  Duration              Arterial Line 05/21/24 0717 Left Radial <1 day              Peripheral Intravenous Line  Duration                  Peripheral IV - Single Lumen 05/21/24 0558 18 G Anterior;Left Hand 1 day         Peripheral IV - Single Lumen 05/21/24 0715 18 G Right Forearm <1 day                    Significant Labs:    CBC/Anemia Profile:  Recent Labs   Lab 05/21/24  1243   WBC 8.56  8.56   HGB 12.9*  12.9*   HCT 37.9*  37.9*     218   MCV 88  88   RDW 12.6  12.6        Chemistries:  Recent Labs   Lab 05/21/24  1243 05/21/24  2248     140 139   K 4.1  4.1 4.4     108 106   CO2 24  24 25   BUN 11  11 13   CREATININE 1.0  1.0 1.2   CALCIUM 8.3*  8.3* 8.9   MG  --  2.1   PHOS  --  2.5*       All pertinent labs within the past 24 hours have been reviewed.    Significant Imaging:  I have reviewed all pertinent imaging results/findings within the past 24 hours.

## 2024-05-22 NOTE — ASSESSMENT & PLAN NOTE
Tan Gilliam is a 69 y.o. s/p CREATION, BYPASS, ARTERIAL, FEMORAL TO POPLITEAL, USING GRAFT (Right), ANGIOGRAM - right lower extremity (Right) 1 Day Post-Op    - Regular diet  - mIVF  - Multi-modal pain control  - ASA/Statin  - Remove thompson/arterial line  - Q4 hour NV checks  - Bowel regimen  - Aggressive pulmonary toilet   - OOBTC/Ambulate  - DVT prophylaxis   - Replace electrolytes as needed    Dispo: okay for step down from the ICU to floor

## 2024-05-22 NOTE — NURSING
Nurses Note -- 4 Eyes      5/22/2024   1:08 PM      Skin assessed during: Transfer      [] No Altered Skin Integrity Present    []Prevention Measures Documented      [] Yes- Altered Skin Integrity Present or Discovered   [x] LDA Added if Not in Epic (Describe Wound)   [] New Altered Skin Integrity was Present on Admit and Documented in LDA   [] Wound Image Taken    Wound Care Consulted? No    Attending Nurse:  Jan Paul RN/Staff Member:  REYNALDO Fernandez

## 2024-05-22 NOTE — PROGRESS NOTES
[facility] ([facility])  Critical Care - Surgery  Progress Note    Patient Name: Tan Gilliam  MRN: [id number]  Admission Date: 5/21/2024  Hospital Length of Stay: 1 days  Code Status: Full Code  Attending Provider: MOI Childers II, MD  Primary Care Provider: Cory Che MD   Principal Problem: Femoral artery aneurysm    Subjective:     Hospital/ICU Course:  No notes on file    Interval History/Significant Events: Chemistries, EKG for frequent PVCs. Pain controlled well. DP/PT 2+. Stable for step down to floor per Vascular.    Follow-up For: Procedure(s) (LRB):  CREATION, BYPASS, ARTERIAL, FEMORAL TO POPLITEAL, USING GRAFT (Right)  ANGIOGRAM - right lower extremity (Right)    Post-Operative Day: 1 Day Post-Op    Objective:     Vital Signs (Most Recent):  Temp: 98.1 °F (36.7 °C) (05/22/24 0300)  Pulse: 67 (05/22/24 0500)  Resp: (!) 22 (05/22/24 0500)  BP: (!) 98/57 (05/22/24 0300)  SpO2: 95 % (05/22/24 0500) Vital Signs (24h Range):  Temp:  [97.8 °F (36.6 °C)-98.8 °F (37.1 °C)] 98.1 °F (36.7 °C)  Pulse:  [61-79] 67  Resp:  [16-26] 22  SpO2:  [91 %-99 %] 95 %  BP: ()/(52-84) 98/57  Arterial Line BP: ()/(39-79) 109/55     Weight: 87.1 kg (192 lb)  Body mass index is 27.55 kg/m².      Intake/Output Summary (Last 24 hours) at 5/22/2024 0558  Last data filed at 5/22/2024 0500  Gross per 24 hour   Intake 2670 ml   Output 2380 ml   Net 290 ml          Physical Exam  Vitals and nursing note reviewed.   Constitutional:       General: He is not in acute distress.     Appearance: Normal appearance. He is normal weight.   HENT:      Mouth/Throat:      Mouth: Mucous membranes are moist.      Pharynx: Oropharynx is clear.   Eyes:      Pupils: Pupils are equal, round, and reactive to light.   Cardiovascular:      Rate and Rhythm: Normal rate and regular rhythm.   Pulmonary:      Effort: Pulmonary effort is normal.      Breath sounds: Normal breath sounds.   Abdominal:      General: Abdomen is flat. Bowel  sounds are normal.      Palpations: Abdomen is soft.   Genitourinary:     Comments: thompson  Skin:     General: Skin is warm and dry.      Capillary Refill: Capillary refill takes less than 2 seconds.            Vents:       Lines/Drains/Airways       Drain  Duration                  Urethral Catheter 05/21/24 0715 Straight-tip 16 Fr. <1 day              Arterial Line  Duration             Arterial Line 05/21/24 0717 Left Radial <1 day              Peripheral Intravenous Line  Duration                  Peripheral IV - Single Lumen 05/21/24 0558 18 G Anterior;Left Hand 1 day         Peripheral IV - Single Lumen 05/21/24 0715 18 G Right Forearm <1 day                    Significant Labs:    CBC/Anemia Profile:  Recent Labs   Lab 05/21/24  1243   WBC 8.56  8.56   HGB 12.9*  12.9*   HCT 37.9*  37.9*     218   MCV 88  88   RDW 12.6  12.6        Chemistries:  Recent Labs   Lab 05/21/24  1243 05/21/24  2248     140 139   K 4.1  4.1 4.4     108 106   CO2 24  24 25   BUN 11  11 13   CREATININE 1.0  1.0 1.2   CALCIUM 8.3*  8.3* 8.9   MG  --  2.1   PHOS  --  2.5*       All pertinent labs within the past 24 hours have been reviewed.    Significant Imaging:  I have reviewed all pertinent imaging results/findings within the past 24 hours.  Assessment/Plan:     Cardiac/Vascular  * Femoral artery aneurysm  Tan Gilliam is a 69 y.o. 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 The patient presents to the SICU s/p right femoral to popliteal bypass with Dr. Childers on 5/21.       Neuro/Psych:   -- Sedation: none  -- Pain:   -- Scheduled Tylenol 1g q8h, gabapentin 300mg TID   -- Oxy PRN  -- Hx of CVA: Xarelto 20mg             Cardiac:   -- S/P femoral to popliteal  bypass with Dr. Childers on 5/21  -- BP Goal: SBP <160  -- Anti-HTNs: amlodipine 5mg daily  -- Rhythm: NSR  -- Statin: Atorvastatin 40 mg QD  -- Hx of HTN: holding home lisinopril 40mg, coreg 6.25mg      Pulmonary:   -- Goal SpO2 >92%  -- ABGs PRN  -- Asthma: home Trelegy, albuterol  -- Former smoker (50+ pack yr hx)     Renal:  -- Trend BUN/Cr   -- Maintain Li, record strict Is/Os    Recent Labs   Lab 05/21/24  1243 05/21/24  2248   BUN 11  11 13   CREATININE 1.0  1.0 1.2         FEN / GI:   -- Daily CMP, PRN K/Mag/Phos per protocol   -- Replace electrolytes as needed  -- Nutrition: regular  -- Bowel Regimen: Miralax, docusate      ID:   -- Afebrile  -- WBC stable  -- Abx: Complete perioperative cefazolin 2g Q8H x 5 doses    Recent Labs   Lab 05/21/24  1243   WBC 8.56  8.56         Heme/Onc:   -- CBC daily  -- ASA 81mg daily    Recent Labs   Lab 05/21/24  1243   HGB 12.9*  12.9*     218         Endocrine/Rheum:   -- Psoriatic arthritis (on Humira)       PPx:   Feeding: regular  Analgesia/Sedation: see above   Thromboembolic Prevention: SDCs  HOB >30: n/a  Stress Ulcer: n/a  Invasive Lines/Drains/Airway:   Left radial arterial line  Jen    Deescalation:      Dispo/Code Status/Palliative:     - Continue SICU Care    - Full Code           Critical care was time spent personally by me on the following activities: development of treatment plan with patient or surrogate and bedside caregivers, discussions with consultants, evaluation of patient's response to treatment, examination of patient, ordering and performing treatments and interventions, ordering and review of laboratory studies, ordering and review of radiographic studies, pulse oximetry, re-evaluation of patient's condition.  This critical care time did not overlap with that of any other provider or involve time for any procedures.     Karmen Daily, DO  Critical Care - Surgery  [facility] (2nd Fl)

## 2024-05-22 NOTE — PLAN OF CARE
Problem: Adult Inpatient Plan of Care  Goal: Plan of Care Review  Outcome: Progressing  Goal: Absence of Hospital-Acquired Illness or Injury  Outcome: Progressing  Goal: Optimal Comfort and Wellbeing  Outcome: Progressing     Problem: Wound  Goal: Skin Health and Integrity  Outcome: Progressing     Problem: Infection  Goal: Absence of Infection Signs and Symptoms  Outcome: Progressing

## 2024-05-22 NOTE — NURSING TRANSFER
Nursing Transfer Note      5/22/2024   9:26 AM    Nurse giving handoff:maksim ngo   Nurse receiving handoff:maksim andrews     Reason patient is being transferred: post op     Transfer To: 1023    Transfer via bed    Transfer with cardiac monitoring    Transported by transport     Transfer Vital Signs:  Blood Pressure:118/65  Heart Rate:69  O2:98  Respirations:21      Telemetry: box 0250 ; HR: 69; sinus with PVCs   Order for Tele Monitor? Yes    Medicines sent: iv fluids    Any special needs or follow-up needed: routine  Chart send with patient: Yes    Notified: son    Patient reassessed at: 9:40

## 2024-05-22 NOTE — SUBJECTIVE & OBJECTIVE
Medications:  Continuous Infusions:   lactated ringers   Intravenous Continuous 125 mL/hr at 05/22/24 0950 New Bag at 05/22/24 0950     Scheduled Meds:   amLODIPine  5 mg Oral Daily    aspirin  81 mg Oral Daily    atorvastatin  40 mg Oral Daily    ceFAZolin (Ancef) IV (PEDS and ADULTS)  2 g Intravenous Q8H    fluticasone furoate-vilanteroL  1 puff Inhalation Daily    gabapentin  300 mg Oral TID    mupirocin   Nasal BID     PRN Meds:  Current Facility-Administered Medications:     albuterol, 2 puff, Inhalation, Q6H PRN    bismuth subsalicylate, 30 mL, Oral, Q6H PRN    calcium gluconate IVPB, 1 g, Intravenous, PRN    calcium gluconate IVPB, 1 g, Intravenous, PRN    calcium gluconate IVPB, 1 g, Intravenous, PRN    diphenhydrAMINE, 25 mg, Intravenous, Q6H PRN    fentaNYL, 25 mcg, Intravenous, Q5 Min PRN    haloperidol lactate, 0.5 mg, Intravenous, Q10 Min PRN    HYDROcodone-acetaminophen, 1 tablet, Oral, Q4H PRN    HYDROmorphone, 0.5 mg, Intravenous, Q15 Min PRN    magnesium sulfate IVPB, 2 g, Intravenous, PRN    magnesium sulfate IVPB, 4 g, Intravenous, PRN    ondansetron, 8 mg, Oral, Q8H PRN    oxyCODONE, 10 mg, Oral, Q4H PRN    potassium chloride, 40 mEq, Intravenous, PRN **AND** potassium chloride, 60 mEq, Intravenous, PRN **AND** potassium chloride, 80 mEq, Intravenous, PRN    sodium chloride 0.9%, 10 mL, Intravenous, PRN    sodium phosphate 15 mmol in dextrose 5 % (D5W) 250 mL IVPB, 15 mmol, Intravenous, PRN    sodium phosphate 20.01 mmol in dextrose 5 % (D5W) 250 mL IVPB, 20.01 mmol, Intravenous, PRN    sodium phosphate 30 mmol in dextrose 5 % (D5W) 250 mL IVPB, 30 mmol, Intravenous, PRN     Objective:     Vital Signs (Most Recent):  Temp: 98.2 °F (36.8 °C) (05/22/24 0707)  Pulse: 64 (05/22/24 0942)  Resp: 18 (05/22/24 0942)  BP: 118/65 (05/22/24 0942)  SpO2: 97 % (05/22/24 0942) Vital Signs (24h Range):  Temp:  [98.1 °F (36.7 °C)-98.8 °F (37.1 °C)] 98.2 °F (36.8 °C)  Pulse:  [59-79] 64  Resp:  [16-26]  18  SpO2:  [91 %-99 %] 97 %  BP: ()/(52-84) 118/65  Arterial Line BP: ()/(39-79) 117/51     Date 05/22/24 0700 - 05/23/24 0659   Shift 5829-6658 7317-9684 8533-6383 24 Hour Total   INTAKE   Shift Total(mL/kg)       OUTPUT   Urine(mL/kg/hr) 525   525   Shift Total(mL/kg) 525(6)   525(6)   Weight (kg) 87.1 87.1 87.1 87.1        Physical Exam  Vitals and nursing note reviewed.   Constitutional:       General: He is not in acute distress.     Appearance: He is not toxic-appearing.   HENT:      Head: Normocephalic and atraumatic.   Cardiovascular:      Rate and Rhythm: Normal rate and regular rhythm.      Pulses:           Dorsalis pedis pulses are 2+ on the right side and 2+ on the left side.        Posterior tibial pulses are 2+ on the right side and 2+ on the left side.   Pulmonary:      Effort: Pulmonary effort is normal. No respiratory distress.   Abdominal:      General: Abdomen is flat.      Palpations: Abdomen is soft.   Skin:     General: Skin is warm and dry.      Comments: RLE incisions cdi with minimal strike through on dressings   Neurological:      General: No focal deficit present.      Mental Status: He is alert and oriented to person, place, and time.          Significant Labs:  CBC:   Recent Labs   Lab 05/21/24  1243   WBC 8.56  8.56   RBC 4.33*  4.33*   HGB 12.9*  12.9*   HCT 37.9*  37.9*     218   MCV 88  88   MCH 29.8  29.8   MCHC 34.0  34.0     CMP:   Recent Labs   Lab 05/21/24  2248   *   CALCIUM 8.9      K 4.4   CO2 25      BUN 13   CREATININE 1.2     All pertinent labs from the last 24 hours have been reviewed.    Significant Diagnostics:  I have reviewed all pertinent imaging results/findings within the past 24 hours.

## 2024-05-23 VITALS
HEIGHT: 70 IN | HEART RATE: 70 BPM | SYSTOLIC BLOOD PRESSURE: 118 MMHG | TEMPERATURE: 99 F | BODY MASS INDEX: 27.49 KG/M2 | OXYGEN SATURATION: 95 % | DIASTOLIC BLOOD PRESSURE: 68 MMHG | RESPIRATION RATE: 18 BRPM | WEIGHT: 192 LBS

## 2024-05-23 LAB
ANION GAP SERPL CALC-SCNC: 7 MMOL/L (ref 8–16)
BASOPHILS # BLD AUTO: 0.06 K/UL (ref 0–0.2)
BASOPHILS NFR BLD: 0.5 % (ref 0–1.9)
BUN SERPL-MCNC: 15 MG/DL (ref 8–23)
CALCIUM SERPL-MCNC: 8.3 MG/DL (ref 8.7–10.5)
CHLORIDE SERPL-SCNC: 106 MMOL/L (ref 95–110)
CO2 SERPL-SCNC: 26 MMOL/L (ref 23–29)
CREAT SERPL-MCNC: 1.2 MG/DL (ref 0.5–1.4)
DIFFERENTIAL METHOD BLD: ABNORMAL
EOSINOPHIL # BLD AUTO: 0.1 K/UL (ref 0–0.5)
EOSINOPHIL NFR BLD: 0.6 % (ref 0–8)
ERYTHROCYTE [DISTWIDTH] IN BLOOD BY AUTOMATED COUNT: 12.9 % (ref 11.5–14.5)
EST. GFR  (NO RACE VARIABLE): >60 ML/MIN/1.73 M^2
GLUCOSE SERPL-MCNC: 93 MG/DL (ref 70–110)
HCT VFR BLD AUTO: 37.3 % (ref 40–54)
HGB BLD-MCNC: 12 G/DL (ref 14–18)
IMM GRANULOCYTES # BLD AUTO: 0.05 K/UL (ref 0–0.04)
IMM GRANULOCYTES NFR BLD AUTO: 0.4 % (ref 0–0.5)
LYMPHOCYTES # BLD AUTO: 2.9 K/UL (ref 1–4.8)
LYMPHOCYTES NFR BLD: 25.5 % (ref 18–48)
MAGNESIUM SERPL-MCNC: 1.7 MG/DL (ref 1.6–2.6)
MCH RBC QN AUTO: 29.6 PG (ref 27–31)
MCHC RBC AUTO-ENTMCNC: 32.2 G/DL (ref 32–36)
MCV RBC AUTO: 92 FL (ref 82–98)
MONOCYTES # BLD AUTO: 1.2 K/UL (ref 0.3–1)
MONOCYTES NFR BLD: 11 % (ref 4–15)
NEUTROPHILS # BLD AUTO: 7 K/UL (ref 1.8–7.7)
NEUTROPHILS NFR BLD: 62 % (ref 38–73)
NRBC BLD-RTO: 0 /100 WBC
PHOSPHATE SERPL-MCNC: 2.5 MG/DL (ref 2.7–4.5)
PLATELET # BLD AUTO: 179 K/UL (ref 150–450)
PMV BLD AUTO: 10.1 FL (ref 9.2–12.9)
POTASSIUM SERPL-SCNC: 4.2 MMOL/L (ref 3.5–5.1)
RBC # BLD AUTO: 4.06 M/UL (ref 4.6–6.2)
SODIUM SERPL-SCNC: 139 MMOL/L (ref 136–145)
WBC # BLD AUTO: 11.22 K/UL (ref 3.9–12.7)

## 2024-05-23 PROCEDURE — 25000003 PHARM REV CODE 250: Performed by: STUDENT IN AN ORGANIZED HEALTH CARE EDUCATION/TRAINING PROGRAM

## 2024-05-23 PROCEDURE — 94761 N-INVAS EAR/PLS OXIMETRY MLT: CPT

## 2024-05-23 PROCEDURE — 36415 COLL VENOUS BLD VENIPUNCTURE: CPT | Performed by: STUDENT IN AN ORGANIZED HEALTH CARE EDUCATION/TRAINING PROGRAM

## 2024-05-23 PROCEDURE — 25000003 PHARM REV CODE 250

## 2024-05-23 PROCEDURE — 80048 BASIC METABOLIC PNL TOTAL CA: CPT | Performed by: STUDENT IN AN ORGANIZED HEALTH CARE EDUCATION/TRAINING PROGRAM

## 2024-05-23 PROCEDURE — 94640 AIRWAY INHALATION TREATMENT: CPT

## 2024-05-23 PROCEDURE — 97161 PT EVAL LOW COMPLEX 20 MIN: CPT

## 2024-05-23 PROCEDURE — 85025 COMPLETE CBC W/AUTO DIFF WBC: CPT | Performed by: STUDENT IN AN ORGANIZED HEALTH CARE EDUCATION/TRAINING PROGRAM

## 2024-05-23 PROCEDURE — 97116 GAIT TRAINING THERAPY: CPT

## 2024-05-23 PROCEDURE — 84100 ASSAY OF PHOSPHORUS: CPT | Performed by: STUDENT IN AN ORGANIZED HEALTH CARE EDUCATION/TRAINING PROGRAM

## 2024-05-23 PROCEDURE — 63600175 PHARM REV CODE 636 W HCPCS: Performed by: STUDENT IN AN ORGANIZED HEALTH CARE EDUCATION/TRAINING PROGRAM

## 2024-05-23 PROCEDURE — 83735 ASSAY OF MAGNESIUM: CPT | Performed by: STUDENT IN AN ORGANIZED HEALTH CARE EDUCATION/TRAINING PROGRAM

## 2024-05-23 RX ORDER — HYDROCODONE BITARTRATE AND ACETAMINOPHEN 5; 325 MG/1; MG/1
1 TABLET ORAL EVERY 4 HOURS PRN
Qty: 28 TABLET | Refills: 0 | Status: SHIPPED | OUTPATIENT
Start: 2024-05-23

## 2024-05-23 RX ADMIN — GABAPENTIN 300 MG: 300 CAPSULE ORAL at 09:05

## 2024-05-23 RX ADMIN — ASPIRIN 81 MG: 81 TABLET, COATED ORAL at 09:05

## 2024-05-23 RX ADMIN — SODIUM CHLORIDE, POTASSIUM CHLORIDE, SODIUM LACTATE AND CALCIUM CHLORIDE: 600; 310; 30; 20 INJECTION, SOLUTION INTRAVENOUS at 12:05

## 2024-05-23 RX ADMIN — DIBASIC SODIUM PHOSPHATE, MONOBASIC POTASSIUM PHOSPHATE AND MONOBASIC SODIUM PHOSPHATE 2 TABLET: 852; 155; 130 TABLET ORAL at 09:05

## 2024-05-23 RX ADMIN — OXYCODONE HYDROCHLORIDE 10 MG: 10 TABLET ORAL at 01:05

## 2024-05-23 RX ADMIN — MUPIROCIN: 20 OINTMENT TOPICAL at 09:05

## 2024-05-23 RX ADMIN — FLUTICASONE FUROATE AND VILANTEROL TRIFENATATE 1 PUFF: 200; 25 POWDER RESPIRATORY (INHALATION) at 08:05

## 2024-05-23 RX ADMIN — ATORVASTATIN CALCIUM 40 MG: 40 TABLET, FILM COATED ORAL at 09:05

## 2024-05-23 RX ADMIN — AMLODIPINE BESYLATE 5 MG: 5 TABLET ORAL at 09:05

## 2024-05-23 NOTE — NURSING
Patient discharged home. Discharge paperwork reviewed with patient and son who verbally verified understanding. Left PIV removed and patient tolerated without complication. Telemetry box removed and returned to nursing station; monitor technician notified prior to removal. Patient awaiting patient transport.

## 2024-05-23 NOTE — PT/OT/SLP EVAL
"Physical Therapy Evaluation, Treatment and Discharge Note    Patient Name:  Tan Gilliam   MRN:  [id number]  Admitting Diagnosis:  Femoral artery aneurysm   Recent Surgery: Procedure(s) (LRB):  CREATION, BYPASS, ARTERIAL, FEMORAL TO POPLITEAL, USING GRAFT (Right)  ANGIOGRAM - right lower extremity (Right) 2 Days Post-Op  Admit Date: 5/21/2024  Length of Stay: 2 days    Recommendations:     Discharge Recommendations:  No Therapy Indicated   Discharge Equipment Recommendations: walker, rolling   Justification for Equipment: The mobility limitation cannot be sufficiently resolved by the use of a cane. Patient's functional mobility deficit can be sufficiently resolved with the use of a Rolling Walker. Patient's mobility limitation significantly impairs their ability to participate in one of more activities of daily living. The use of a Rolling Walker will significantly improve the patient's ability to participate in MRADLS and the patient will use it on regular basis in the home.   Barriers to discharge: None    Assessment:     Tan Gilliam is a 69 y.o. male admitted with a medical diagnosis of Femoral artery aneurysm. .  At this time, patient is functioning at their prior level of function and does not require further acute PT services.     Highest level of mobility achieved this visit: Pt ambulates 30ft w/ RW and CGA    Treatment Tolerated: Well    Plan:     During this hospitalization, patient does not require further acute PT services.  Please re-consult if situation changes.      Subjective     RN Jan notified prior to session. Pt's family present upon PT entrance into room.    Chief Complaint: pain  Patient/Family Comments/goals: "It hurts most when I walk"  Pain/Comfort:  Pain Rating 1: 8/10  Location - Side 1: Right  Location - Orientation 1: generalized  Location 1: leg  Pain Addressed 1: Distraction, Reposition, Cessation of Activity    Social History:  Residence: lives with their family 1-story house " "with threshold [place].  Support available: family  Equipment Used: none  Equipment Owned (not using): None  Prior level of function: independent  Work: Employed part time labor   Drive: yes.     Objective:     Additional staff present: none    Patient found up in chair with  (no active lines) upon PT entry to room.    General Precautions: Standard, fall   Orthopedic Precautions:N/A   Braces: N/A   Body mass index is 27.55 kg/m².  Oxygen Device: Room Air  Vitals: /68 (BP Location: Left arm, Patient Position: Lying)   Pulse 70   Temp 98.5 °F (36.9 °C) (Oral)   Resp 18   Ht 5' 10" (1.778 m)   Wt 87.1 kg (192 lb)   SpO2 95%   BMI 27.55 kg/m²     Exam:  Cognition:   Alert and Cooperative  Command following: Follows multistep verbal commands  Fluency: clear/fluent  Skin Integrity: Intact dressing present RLE  Edema: None noted  Sensation: Intact to light touch  Hearing: Intact  Vision:  Intact  Coordination: grossly intact  Range of Motion:  RLE: WFL (mildly limited knee extension)  LLE: WFL  Strength Exam:  Bilateral Lower Extremity Strength: grossly WFL    Outcome Measures:  AM-PAC 6 CLICK MOBILITY  Turning over in bed (including adjusting bedclothes, sheets and blankets)?: 4  Sitting down on and standing up from a chair with arms (e.g., wheelchair, bedside commode, etc.): 3  Moving from lying on back to sitting on the side of the bed?: 4  Moving to and from a bed to a chair (including a wheelchair)?: 3  Need to walk in hospital room?: 3  Climbing 3-5 steps with a railing?: 2  Basic Mobility Total Score: 19     Functional Mobility:    Bed Mobility:   Pt found/returned to bedside chair    Transfers:   Sit > Stand Transfer: contact guard assistance with rolling walker   Stand > Sit Transfer: contact guard assistance with rolling walker   x1 trials from bedside chair    Standing Balance:  Assistance Level Required: Contact Guard Assistance  Patient used: rolling walker   Time: 10 min  Postural deviations noted: " no deviations noted    Gait:   Patient ambulated: 30ft   Patient required: contact guard  Patient used:  rolling walker   Gait Pattern observed: swing-to gait  Gait Deviation(s): decreased step length, decreased weight shift, decreased duran, and increased time in stance phase on unaffected leg  Impairments due to: pain and decreased endurance  Gait belt utilized    Education:  Time provided for education, counseling and discussion of health disposition in regards to patient's current status  All questions answered within PT scope of practice and to patient's satisfaction  Pt educated on proper body mechanics, safety techniques, and energy conservation with PT facilitation and cueing throughout session    Patient left up in chair with call button in reach and family present.    History:     Past Medical History:   Diagnosis Date    Asthma     CVA (cerebral vascular accident)     Hypertension        Past Surgical History:   Procedure Laterality Date    ANGIOGRAPHY Right 5/21/2024    Procedure: ANGIOGRAM - right lower extremity;  Surgeon: MOI Childers II, MD;  Location: [address];  Service: Vascular;  Laterality: Right;    COLONOSCOPY N/A 4/25/2019    Procedure: COLONOSCOPY;  Surgeon: Luis Bogran-Reyes, MD;  Location: [facility];  Service: Endoscopy;  Laterality: N/A;    CREATION OF FEMOROPOPLITEAL ARTERIAL BYPASS USING GRAFT Right 5/21/2024    Procedure: CREATION, BYPASS, ARTERIAL, FEMORAL TO POPLITEAL, USING GRAFT;  Surgeon: MOI Childers II, MD;  Location: [address];  Service: Vascular;  Laterality: Right;    HEMORRHOID SURGERY      NOSE SURGERY      TONSILLECTOMY         Time Tracking:     PT Received On: 05/23/24  PT Start Time: 1420     PT Stop Time: 1438  PT Total Time (min): 18 min     Billable Minutes: Evaluation 1 procedure and Gait Training 9 min    Juancarlos Esqueda PT, DPT  5/23/2024

## 2024-05-23 NOTE — DISCHARGE SUMMARY
[facility]  General Surgery  Discharge Summary      Patient Name: Tan Gilliam  MRN: [id number]  Admission Date: 5/21/2024  Hospital Length of Stay: 2 days  Discharge Date and Time:  05/23/2024 9:00 AM  Attending Physician: MOI Childers II, MD   Discharging Provider: Ankush Marcos MD  Primary Care Provider: Cory Che MD     HPI: Tan Gilliam is a 69 y.o. 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 He discovered the aneurysms from CT imaging in the ER 3/16/24 for abdominal pain which was diagnosed diverticulitis. He reports feeling pulsating sensations in both legs for the past 3 months. He denies any pain or discomfort. He has not noticed this happening before. He states his activity level is normal, can walk up stairs, does yard work, mows lawns. Works as a . He denies chest pain, abdominal pain, nausea, vomiting, change in bowel habits.     Procedure(s) (LRB):  CREATION, BYPASS, ARTERIAL, FEMORAL TO POPLITEAL, USING GRAFT (Right)  ANGIOGRAM - right lower extremity (Right)     Hospital Course: Underwent above procedure without complication on 5/21 and admitted to ICU postoperatively for q1hr NC checks. He was stepped down to the floor on POD1. On POD2, his pain is well controlled, he is ambulating with assistance of a rolling walker, and would like to go home. Stable for dc home.    Significant Diagnostic Studies: N/A    Pending Diagnostic Studies:       None          Final Active Diagnoses:    Diagnosis Date Noted POA    PRINCIPAL PROBLEM:  Femoral artery aneurysm [I72.4] 05/06/2024 Yes      Problems Resolved During this Admission:      Discharged Condition: good    Disposition: Home or Self Care    Follow Up:   Follow-up Information       MOI Childers II, MD Follow up in 2 week(s).    Specialty: Vascular  "Surgery  Contact information:  [address] [facility] [address]  [phone]                           Patient Instructions:      WALKER FOR HOME USE     Order Specific Question Answer Comments   Type of Walker: Adult (5'4"-6'6")    With wheels? Yes    Height: 5' 10" (1.778 m)    Weight: 87.1 kg (192 lb)    Length of need (1-99 months): 99    Please check all that apply: Patient's condition impairs ambulation.      VAS US Ankle Brachial Indices Resting   Standing Status: Future Standing Exp. Date: 05/23/25     Order Specific Question Answer Comments   Release to patient Immediate      VAS US Arterial Leg Right   Standing Status: Future Standing Exp. Date: 05/23/25     Order Specific Question Answer Comments   Release to patient Immediate      Diet Adult Regular     Notify your health care provider if you experience any of the following:  temperature >100.4     Notify your health care provider if you experience any of the following:  persistent nausea and vomiting or diarrhea     Notify your health care provider if you experience any of the following:  severe uncontrolled pain     Notify your health care provider if you experience any of the following:  redness, tenderness, or signs of infection (pain, swelling, redness, odor or green/yellow discharge around incision site)     Notify your health care provider if you experience any of the following:  difficulty breathing or increased cough     Notify your health care provider if you experience any of the following:  severe persistent headache     Notify your health care provider if you experience any of the following:  worsening rash     Notify your health care provider if you experience any of the following:  persistent dizziness, light-headedness, or visual disturbances     Notify your health care provider if you experience any of the following:  increased confusion or weakness     Change dressing (specify)   Order Comments: Dressing change: 1 times per " day using guaze and tape.     Activity as tolerated     Medications:  Reconciled Home Medications:      Medication List        START taking these medications      HYDROcodone-acetaminophen 5-325 mg per tablet  Commonly known as: NORCO  Take 1 tablet by mouth every 4 (four) hours as needed for Pain.            CONTINUE taking these medications      adalimumab-adbm 40 mg/0.8 mL Pnkt  Commonly known as: CYLTEZO(CF) PEN  Inject 40 mg into the skin every 14 (fourteen) days.     amLODIPine 5 MG tablet  Commonly known as: NORVASC  Take 5 mg by mouth.     ammonium lactate 12 % lotion  Commonly known as: LAC-HYDRIN  Apply topically 2 (two) times daily.     aspirin 81 MG EC tablet  Commonly known as: ECOTRIN  Take 81 mg by mouth once daily.     COREG 6.25 mg tablet  Generic drug: carvediloL  Coreg 6.25 mg tablet, [RxNorm: 142716]     DUPIXENT  mg/2 mL Pnij  Generic drug: dupilumab  Inject 2 mLs (300 mg total) into the skin every 14 (fourteen) days.     lisinopriL 40 MG tablet  Commonly known as: PRINIVIL,ZESTRIL  TAKE 1 TABLET BY MOUTH ONCE DAILY IN THE EVENING     naproxen 500 MG tablet  Commonly known as: NAPROSYN  TAKE 1 TABLET BY MOUTH EVERY 12 HOURS WITH FOOD OR MILK AS NEEDED for 30     TRELEGY ELLIPTA 200-62.5-25 mcg inhaler  Generic drug: fluticasone-umeclidin-vilanter  INHALE 1 PUFF ONCE DAILY     * albuterol 90 mcg/actuation inhaler  Commonly known as: PROVENTIL/VENTOLIN HFA  Inhale 2 puffs into the lungs every 6 (six) hours as needed for Wheezing. Rescue     * VENTOLIN HFA 90 mcg/actuation inhaler  Generic drug: albuterol  INHALE 2 PUFFS BY MOUTH EVERY 6 HOURS AS NEEDED FOR WHEEZING     XARELTO 20 mg Tab  Generic drug: rivaroxaban  Xarelto 20 mg tablet, [RxNorm: 4540275]           * This list has 2 medication(s) that are the same as other medications prescribed for you. Read the directions carefully, and ask your doctor or other care provider to review them with you.                  Ankush Marcos,  MD  General Surgery  [facility]

## 2024-05-23 NOTE — DISCHARGE INSTRUCTIONS
Medications  Xarelto- You can restart your home Xarelto TOMORROW, 5/24  You have been prescribed pain medicine, but you should also take tylenol and ibuprofen for pain to limit the amount of narcotics you are taking      GROIN WOUND CARE:  It is very important that the wound in your groin stays dry and clean because this area collects moisture and bacteria which greatly increases the risk for infection  and wound complications.    EVERY DAY:  - Starting two days after your surgery, you may shower  - Following your shower, pat the wound dry  - Paint the wound with a small amount of betadine  - Cover the wound with 4x4 gauze and tape  - Change the dressing in the same fashion every day, sooner if the gauze gets saturated with fluid    CONTACT US IMMEDIATELY  - Is getting more painful with time not less  - Becomes red  - Has pus coming out  - Falls apart   - Or your are having fevers

## 2024-05-23 NOTE — PLAN OF CARE
CHW met with patient/family at bedside. Patient experience rounding completed and reviewed the following.     Do you know your discharge plan? Yes or No,    If yes, what is the plan? (Home, Home Health, Rehab, SNF, LTAC, or Other)    Home    Have you discussed your needs and preferences with your SW/CM? Yes or No    Yes    If you are discharging home, do you have help at home? Yes or No   Yes    Do you think you will need help additional at home at discharge? Yes or No    No    Do you currently have difficulty keeping up with bills, affording medicine or buying food? Yes or No      No    Assigned SW/CM notified of any patient/family needs or concerns. Appropriate resources provided to address patient's needs.    Slick Cardenas CHW  Case Management  [phone]

## 2024-05-23 NOTE — SUBJECTIVE & OBJECTIVE
Medications:  Continuous Infusions:   lactated ringers   Intravenous Continuous 125 mL/hr at 05/23/24 0014 New Bag at 05/23/24 0014     Scheduled Meds:   amLODIPine  5 mg Oral Daily    aspirin  81 mg Oral Daily    atorvastatin  40 mg Oral Daily    fluticasone furoate-vilanteroL  1 puff Inhalation Daily    gabapentin  300 mg Oral TID    k phos di & mono-sod phos mono  2 tablet Oral Once    mupirocin   Nasal BID     PRN Meds:  Current Facility-Administered Medications:     albuterol, 2 puff, Inhalation, Q6H PRN    bismuth subsalicylate, 30 mL, Oral, Q6H PRN    HYDROcodone-acetaminophen, 1 tablet, Oral, Q4H PRN    ondansetron, 8 mg, Oral, Q8H PRN    oxyCODONE, 10 mg, Oral, Q4H PRN     Objective:     Vital Signs (Most Recent):  Temp: 98 °F (36.7 °C) (05/23/24 0329)  Pulse: 76 (05/23/24 0800)  Resp: 20 (05/23/24 0800)  BP: 137/82 (05/23/24 0800)  SpO2: (!) 92 % (05/23/24 0800) Vital Signs (24h Range):  Temp:  [97.8 °F (36.6 °C)-98.2 °F (36.8 °C)] 98 °F (36.7 °C)  Pulse:  [64-76] 76  Resp:  [16-25] 20  SpO2:  [91 %-97 %] 92 %  BP: (106-137)/(65-82) 137/82          Physical Exam  Vitals and nursing note reviewed.   Constitutional:       General: He is not in acute distress.     Appearance: He is not toxic-appearing.   HENT:      Head: Normocephalic and atraumatic.   Cardiovascular:      Rate and Rhythm: Normal rate and regular rhythm.      Pulses:           Dorsalis pedis pulses are 2+ on the right side and 2+ on the left side.        Posterior tibial pulses are 2+ on the right side and 2+ on the left side.   Pulmonary:      Effort: Pulmonary effort is normal. No respiratory distress.   Abdominal:      General: Abdomen is flat.      Palpations: Abdomen is soft.   Skin:     General: Skin is warm and dry.      Comments: RLE incisions cdi with minimal strike through on dressings   Neurological:      General: No focal deficit present.      Mental Status: He is alert and oriented to person, place, and time.           Significant Labs:  Recent Lab Results         05/23/24  0407   05/22/24  1753        Anion Gap 7   10       Baso # 0.06   0.04       Basophil % 0.5   0.3       BUN 15   17       Calcium 8.3   9.1       Chloride 106   108       CO2 26   24       Creatinine 1.2   1.3       Differential Method Automated   Automated       eGFR >60.0   58.7       Eos # 0.1   0.1       Eos % 0.6   0.3       Glucose 93   144       Gran # (ANC) 7.0   10.2       Gran % 62.0   69.5       Hematocrit 37.3   41.4       Hemoglobin 12.0   13.5       Immature Grans (Abs) 0.05  Comment: Mild elevation in immature granulocytes is non specific and   can be seen in a variety of conditions including stress response,   acute inflammation, trauma and pregnancy. Correlation with other   laboratory and clinical findings is essential.     0.05  Comment: Mild elevation in immature granulocytes is non specific and   can be seen in a variety of conditions including stress response,   acute inflammation, trauma and pregnancy. Correlation with other   laboratory and clinical findings is essential.         Immature Granulocytes 0.4   0.3       Lymph # 2.9   3.1       Lymph % 25.5   20.9       Magnesium  1.7   2.3       MCH 29.6   29.7       MCHC 32.2   32.6       MCV 92   91       Mono # 1.2   1.3       Mono % 11.0   8.7       MPV 10.1   9.7       nRBC 0   0       Phosphorus Level 2.5   2.4       Platelet Count 179   235       Potassium 4.2   4.5       RBC 4.06   4.54       RDW 12.9   12.8       Sodium 139   142       WBC 11.22   14.61               Significant Diagnostics:  I have reviewed all pertinent imaging results/findings within the past 24 hours.

## 2024-05-23 NOTE — PROGRESS NOTES
[facility]  Vascular Surgery  Progress Note    Patient Name: Tan Gilliam  MRN: [id number]  Admission Date: 5/21/2024  Primary Care Provider: Cory Che MD    Subjective:     Interval History: NAEON. AF, HDS. Ambulated in room multiple times yesterday. Pain well controlled. Needs walker to ambulate. Would like to go home    Post-Op Info:  Procedure(s) (LRB):  CREATION, BYPASS, ARTERIAL, FEMORAL TO POPLITEAL, USING GRAFT (Right)  ANGIOGRAM - right lower extremity (Right)   2 Days Post-Op     Medications:  Continuous Infusions:   lactated ringers   Intravenous Continuous 125 mL/hr at 05/23/24 0014 New Bag at 05/23/24 0014     Scheduled Meds:   amLODIPine  5 mg Oral Daily    aspirin  81 mg Oral Daily    atorvastatin  40 mg Oral Daily    fluticasone furoate-vilanteroL  1 puff Inhalation Daily    gabapentin  300 mg Oral TID    k phos di & mono-sod phos mono  2 tablet Oral Once    mupirocin   Nasal BID     PRN Meds:  Current Facility-Administered Medications:     albuterol, 2 puff, Inhalation, Q6H PRN    bismuth subsalicylate, 30 mL, Oral, Q6H PRN    HYDROcodone-acetaminophen, 1 tablet, Oral, Q4H PRN    ondansetron, 8 mg, Oral, Q8H PRN    oxyCODONE, 10 mg, Oral, Q4H PRN     Objective:     Vital Signs (Most Recent):  Temp: 98 °F (36.7 °C) (05/23/24 0329)  Pulse: 76 (05/23/24 0800)  Resp: 20 (05/23/24 0800)  BP: 137/82 (05/23/24 0800)  SpO2: (!) 92 % (05/23/24 0800) Vital Signs (24h Range):  Temp:  [97.8 °F (36.6 °C)-98.2 °F (36.8 °C)] 98 °F (36.7 °C)  Pulse:  [64-76] 76  Resp:  [16-25] 20  SpO2:  [91 %-97 %] 92 %  BP: (106-137)/(65-82) 137/82          Physical Exam  Vitals and nursing note reviewed.   Constitutional:       General: He is not in acute distress.     Appearance: He is not toxic-appearing.   HENT:      Head: Normocephalic and atraumatic.   Cardiovascular:      Rate and Rhythm: Normal rate and regular rhythm.      Pulses:           Dorsalis pedis pulses are 2+ on the right side and 2+ on the left  side.        Posterior tibial pulses are 2+ on the right side and 2+ on the left side.   Pulmonary:      Effort: Pulmonary effort is normal. No respiratory distress.   Abdominal:      General: Abdomen is flat.      Palpations: Abdomen is soft.   Skin:     General: Skin is warm and dry.      Comments: RLE incisions cdi with minimal strike through on dressings   Neurological:      General: No focal deficit present.      Mental Status: He is alert and oriented to person, place, and time.          Significant Labs:  Recent Lab Results         05/23/24  0407   05/22/24  1753        Anion Gap 7   10       Baso # 0.06   0.04       Basophil % 0.5   0.3       BUN 15   17       Calcium 8.3   9.1       Chloride 106   108       CO2 26   24       Creatinine 1.2   1.3       Differential Method Automated   Automated       eGFR >60.0   58.7       Eos # 0.1   0.1       Eos % 0.6   0.3       Glucose 93   144       Gran # (ANC) 7.0   10.2       Gran % 62.0   69.5       Hematocrit 37.3   41.4       Hemoglobin 12.0   13.5       Immature Grans (Abs) 0.05  Comment: Mild elevation in immature granulocytes is non specific and   can be seen in a variety of conditions including stress response,   acute inflammation, trauma and pregnancy. Correlation with other   laboratory and clinical findings is essential.     0.05  Comment: Mild elevation in immature granulocytes is non specific and   can be seen in a variety of conditions including stress response,   acute inflammation, trauma and pregnancy. Correlation with other   laboratory and clinical findings is essential.         Immature Granulocytes 0.4   0.3       Lymph # 2.9   3.1       Lymph % 25.5   20.9       Magnesium  1.7   2.3       MCH 29.6   29.7       MCHC 32.2   32.6       MCV 92   91       Mono # 1.2   1.3       Mono % 11.0   8.7       MPV 10.1   9.7       nRBC 0   0       Phosphorus Level 2.5   2.4       Platelet Count 179   235       Potassium 4.2   4.5       RBC 4.06   4.54        RDW 12.9   12.8       Sodium 139   142       WBC 11.22   14.61               Significant Diagnostics:  I have reviewed all pertinent imaging results/findings within the past 24 hours.  Assessment/Plan:     * Femoral artery aneurysm  Tan Gilliam is a 69 y.o. s/p CREATION, BYPASS, ARTERIAL, FEMORAL TO POPLITEAL, USING GRAFT (Right), ANGIOGRAM - right lower extremity (Right) 2 Days Post-Op    - Regular diet  - mIVF  - Multi-modal pain control  - ASA/Statin  - Remove thompson/arterial line  - Q4 hour NV checks  - Bowel regimen  - Aggressive pulmonary toilet   - OOBTC/Ambulate  -PT consulted for need for rolling walker   The mobility limitation cannot be sufficiently resolved by the use of a cane. Patient's functional mobility deficit can be sufficiently resolved with the use of a (Rolling Walker or Walker). Patient's mobility limitation significantly impairs their ability to participate in one of more activities of daily living. The use of a (RW or Walker) will significantly improve the patient's ability to participate in MRADLS and the patient will use it on regular basis in the home.   - DVT prophylaxis   - Replace electrolytes as needed    Dispo: LIMA Marcos MD  Vascular Surgery  [facility]

## 2024-05-24 NOTE — OP NOTE
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 Right is larger and will plan for repair of his right popliteal artery aneurysm. Unfortunately his CTA imaging was inadequate.  There was so much contrast washout from his large aneurysms that the contrast could not be visualized below-the-knee.  This was not due to occlusion, but simply due to contrast washout.  Therefore we will plan for a right lower extremity angiogram to adequately visualize the arteries below-the-knee.  Based on that angiogram we will decide whether open repair with bypass versus endovascular repair with stenting is appropriate.       Procedure in Detail:   The patient was brought to the operating room supine position.  The right lower extremity was prepped and draped circumferentially in normal sterile fashion.  A time-out was performed to confirm appropriate patient identification, procedure, laterality.      We began by making a longitudinal incision over the right mid thigh on the medial aspect over the superficial femoral artery.  This is deepened the combination of electrocautery, blunt dissection, sharp dissection.  We eventually dissected out the SFA circumferentially.  The artery was healthy without any aneurysmal dilation or significant disease.  We then accessed the artery with a micropuncture access kit.  A angiogram was performed through the micropuncture sheath however there was so much contrast washout that we could not adequately see the runoff.  We then exchanged out the  micropuncture for a 5 Greenlandic sheath over a stiff angled Glidewire.  We then advanced a 5 Greenlandic glide catheter over a stiff angled Glidewire into the below-knee popliteal artery.  We then performed a angiogram of the right lower extremity from the below-knee popliteal artery through the catheter.  Angiography showed widely patent below-knee popliteal artery with single-vessel runoff via the peroneal artery with excellent arborization to both the dorsalis pedis and posterior tibial arteries.  There was no apparent stenosis in the peroneal artery.  Based on these findings we did not feel that endovascular repair was appropriate.  Therefore we proceeded with open repair.    We then made a longitudinal incision just below the medial aspect of the patient's right knee.  The incision was deepened with a combination of electrocautery, blunt dissection, sharp dissection.  The gastroc was retracted posteriorly and the soleal attachments to the tibia were divided with electrocautery.  We were able to expose the below-knee popliteal artery and dissected it free of the popliteal veins dissecting circumferentially and then dissecting out both proximally and distally.  Once we had adequate exposure of the below-knee popliteal artery we then created a tunnel from the proximal to the distal incision using a short Franklin tunneler.  We brought a 8 mm ringed PTFE graft through the tunnel.  We then administered IV heparin for systemic anticoagulation.  The SFA was clamped proximally and distally within the incision.  We made a longitudinal arteriotomy in the SFA.  The graft was beveled to fit the arteriotomy and was sewn onto the artery with running 5 0 Prolene suture.  After the anastomosis was completed the clamps were released and there was brisk flow through the graft.  The graft was retrograde flushed with heparinized saline and the graft was clamped proximally with a Lele Hydragrip clamp.  We then turned our attention to the  distal incision where clamps were applied proximally and distally to the below-knee popliteal artery.  A longitudinal arteriotomy was made in the below-knee popliteal artery with an 11 blade and extended with Malik scissors.  We then beveled the 8 mm PTFE graft to fit the arteriotomy and sewed the graft onto the artery with running 5 0 Prolene suture.  Prior to completion of the anastomosis the below-knee popliteal artery clamps were briefly released and there was adequate backbleeding with no apparent thrombus.  The clamps were reapplied and the graft clamp was briefly released as well with brisk flow through the graft.  We then flushed the lumen with heparinized saline and reapplied outflow clamps and completed the anastomosis.  Finally after the anastomosis was completed we released the proximal clamp on the below-knee popliteal artery and the clamp on the graft to retrograde flush the bypass.  We then ligated the superficial femoral artery beyond the origin of the bypass graft with a 0 silk tie.  We then ligated the below-knee popliteal artery proximal to the distal anastomosis with a 0 silk tie.  Now that the aneurysm had been completely excluded we checked distal signals.  The patient had triphasic Doppler signals in the dorsalis pedis.  Satisfied with our result we then reversed heparin with protamine.  Both incisions were thoroughly irrigated with saline.  Thrombin Gelfoam was added to assist with hemostasis and the incisions were again irrigated with saline.  Hemostasis was excellent.  The incisions were closed in the deep layers with interrupted 2-0 Vicryl sutures.  The deep dermis was reapproximated with 3-0 Vicryl sutures.  The skin was closed with interrupted vertical mattress 3-0 nylon sutures and staples.  The incisions were dressed with 4 x 4 gauze and Medipore tape.  The patient was extubated and tolerated the procedure well was transported to the PACU in stable condition.    Estimated Blood loss:  100ml    Complications: none    MOI Childers II, MD, RPVI  Vascular Surgery  [facility]

## 2024-05-27 NOTE — PLAN OF CARE
[facility]  Discharge Final Note    Primary Care Provider: Cory Che MD  Expected Discharge Date: 5/23/2024    Patient medically ready for discharge to home.     Transportation by home.     Is family/patient aware of discharge: yes     Hospital follow up scheduled: yes       Final Discharge Note (most recent)       Final Note - 05/27/24 1319          Final Note    Assessment Type Final Discharge Note     Anticipated Discharge Disposition Home or Self Care     Hospital Resources/Appts/Education Provided Provided patient/caregiver with written discharge plan information                     Important Message from Medicare  Important Message from Medicare regarding Discharge Appeal Rights: Given to patient/caregiver, Explained to patient/caregiver, Signed/date by patient/caregiver   Date IMM was signed: 05/23/24  Time IMM was signed: 1016  Referral Info (most recent)       Referral Info    No documentation.                 Contact Info       MOI Childers II, MD   Specialty: Vascular Surgery    [address] [place] [address]   Phone: [phone]       Next Steps: Follow up in 2 week(s)          Future Appointments   Date Time Provider Department Center   6/3/2024  9:00 AM VASCULAR, LAB [facility]   6/3/2024  9:30 AM MOI Childers II, MD [facility] V[facility]CS[facility]   9/4/2024 10:00 AM TG US1 TG ULTRA [facility]   9/17/2024  8:00 AM John Benavides MD [facility]     Slick Espinoza RN  Case Management  Ext: [phone]  05/27/2024

## 2024-05-30 ENCOUNTER — TELEPHONE (OUTPATIENT)
Dept: VASCULAR SURGERY | Facility: CLINIC | Age: 69
End: 2024-05-30
Payer: MEDICARE

## 2024-05-30 NOTE — TELEPHONE ENCOUNTER
----- Message from Lexii Peterson sent at 5/29/2024 11:32 AM CDT -----  Regarding: Symptoms  Contact: [phone]  Tan Gilliam calling regarding Patient Advice (message) for # pt son is calling to speak with nurse regarding pt symptoms he is having a lot of pain around the surgical site he did have surgery on 5/21 he can [facility] please call to advise

## 2024-05-30 NOTE — TELEPHONE ENCOUNTER
"Per Dr FROILAN Childers returned call spoke with Vishnu, " Mr Gilliam will have pain, his post -op appointment is Monday. If the pain gets unbearable bring him to the emergency room." Vishnu states," my dad said he is okay right now, he will wait until Monday but if things get worst I will bring him to the emergency room at [facility]."  "

## 2024-06-03 ENCOUNTER — HOSPITAL ENCOUNTER (OUTPATIENT)
Dept: VASCULAR SURGERY | Facility: CLINIC | Age: 69
Discharge: HOME | End: 2024-06-03
Payer: COMMERCIAL

## 2024-06-03 ENCOUNTER — OFFICE VISIT (OUTPATIENT)
Dept: VASCULAR SURGERY | Facility: CLINIC | Age: 69
End: 2024-06-03
Attending: SURGERY
Payer: MEDICARE

## 2024-06-03 VITALS
SYSTOLIC BLOOD PRESSURE: 148 MMHG | DIASTOLIC BLOOD PRESSURE: 79 MMHG | BODY MASS INDEX: 26.82 KG/M2 | HEIGHT: 70 IN | HEART RATE: 76 BPM | WEIGHT: 187.38 LBS | TEMPERATURE: 98 F

## 2024-06-03 DIAGNOSIS — I73.9 PVD (PERIPHERAL VASCULAR DISEASE): ICD-10-CM

## 2024-06-03 DIAGNOSIS — I72.4 BILATERAL POPLITEAL ARTERY ANEURYSM: Primary | ICD-10-CM

## 2024-06-03 PROCEDURE — 99999 PR PBB SHADOW E&M-EST. PATIENT-LVL III: CPT | Mod: PBBFAC,,, | Performed by: SURGERY

## 2024-06-03 PROCEDURE — 93925 LOWER EXTREMITY STUDY: CPT | Mod: PBBFAC | Performed by: SURGERY

## 2024-06-03 PROCEDURE — 99024 POSTOP FOLLOW-UP VISIT: CPT | Mod: POP,,, | Performed by: SURGERY

## 2024-06-03 PROCEDURE — 99213 OFFICE O/P EST LOW 20 MIN: CPT | Mod: PBBFAC,25 | Performed by: SURGERY

## 2024-06-06 DIAGNOSIS — I72.4 BILATERAL POPLITEAL ARTERY ANEURYSM: Primary | ICD-10-CM

## 2024-06-06 NOTE — PROGRESS NOTES
VASCULAR SURGERY CLINIC NOTE    Patient returns for clinic visit after left SFA to below-knee popliteal artery bypass with PTFE.  He denies any issues in the immediate postop after his bypass but last week began experiencing tightness behind his knee as well as in his calf and achilles tendon.  He denies any issues at his incision.    PHYSICAL EXAM  Incision CDI, no erythema, no drainage, no swelling  2+ DP pulse  Tenderness and tightness over the posterior knee    IMAGING:  RLE arterial duplex:  Widely patent femoral to below-knee popliteal artery bypass with no evidence of stenosis.  His popliteal aneurysm has decreased in size to less than 2 cm.    A/P:  69-year-old male status post femoral to popliteal artery bypass for right popliteal artery aneurysm.  He still has bilateral common femoral and left popliteal artery aneurysms which will eventually require surgical repair.  His recovery has slowed after he began to experience tightness behind his left calf and Achilles tendon and knee.  I suspect he may have a hematoma, but his skin is not threatened and I would not evacuation drainage of this unless he has nerve compression or skin/muscle compromise.      Will obtain CT non-con of RLE  -continue home Xarelto and ASA daily  -RTC 2 weeks for re-evaluation    MOI Childers II, MD, [facility]  Vascular Surgery  [facility]

## 2024-06-13 ENCOUNTER — TELEPHONE (OUTPATIENT)
Dept: VASCULAR SURGERY | Facility: CLINIC | Age: 69
End: 2024-06-13
Payer: COMMERCIAL

## 2024-06-13 DIAGNOSIS — I72.4 BILATERAL POPLITEAL ARTERY ANEURYSM: Primary | ICD-10-CM

## 2024-06-13 NOTE — TELEPHONE ENCOUNTER
"Spoke with Vishnu(son),states," my dad Mr Gilliam has swelling behind is right knee and medial just above his knee, also he having painful spasms and shooting pain from his ankle up his leg. No matter if he take pain medication the pain is still there. I was not able to get the CT scan that Dr Childers ordered scheduled due to the change in my dad insurance. What should I do?" Dr Pro the on call physician notified. Per Dr Pro  " try to get the Ct scan done tomorrow here at [facility] or have the patient come to the emergency room."  Vishnu states, " I will bring my dad to the emergency room." Dr Pro notified.  "

## 2024-06-15 ENCOUNTER — HOSPITAL ENCOUNTER (EMERGENCY)
Facility: HOSPITAL | Age: 69
Discharge: HOME | End: 2024-06-15
Attending: EMERGENCY MEDICINE
Payer: COMMERCIAL

## 2024-06-15 VITALS
DIASTOLIC BLOOD PRESSURE: 81 MMHG | HEIGHT: 69 IN | SYSTOLIC BLOOD PRESSURE: 159 MMHG | WEIGHT: 198 LBS | RESPIRATION RATE: 18 BRPM | BODY MASS INDEX: 29.33 KG/M2 | OXYGEN SATURATION: 96 % | TEMPERATURE: 98 F | HEART RATE: 64 BPM

## 2024-06-15 DIAGNOSIS — G89.18 POST-OPERATIVE PAIN: Primary | ICD-10-CM

## 2024-06-15 LAB
ALBUMIN SERPL BCP-MCNC: 3.6 G/DL (ref 3.5–5.2)
ALP SERPL-CCNC: 93 U/L (ref 55–135)
ALT SERPL W/O P-5'-P-CCNC: 12 U/L (ref 10–44)
ANION GAP SERPL CALC-SCNC: 10 MMOL/L (ref 8–16)
AST SERPL-CCNC: 14 U/L (ref 10–40)
BASOPHILS # BLD AUTO: 0.07 K/UL (ref 0–0.2)
BASOPHILS NFR BLD: 0.9 % (ref 0–1.9)
BILIRUB SERPL-MCNC: 1 MG/DL (ref 0.1–1)
BUN SERPL-MCNC: 17 MG/DL (ref 8–23)
CALCIUM SERPL-MCNC: 9.6 MG/DL (ref 8.7–10.5)
CHLORIDE SERPL-SCNC: 105 MMOL/L (ref 95–110)
CO2 SERPL-SCNC: 25 MMOL/L (ref 23–29)
CREAT SERPL-MCNC: 1.1 MG/DL (ref 0.5–1.4)
DIFFERENTIAL METHOD BLD: ABNORMAL
EOSINOPHIL # BLD AUTO: 0.2 K/UL (ref 0–0.5)
EOSINOPHIL NFR BLD: 2.7 % (ref 0–8)
ERYTHROCYTE [DISTWIDTH] IN BLOOD BY AUTOMATED COUNT: 13.2 % (ref 11.5–14.5)
EST. GFR  (NO RACE VARIABLE): >60 ML/MIN/1.73 M^2
GLUCOSE SERPL-MCNC: 82 MG/DL (ref 70–110)
HCT VFR BLD AUTO: 41.7 % (ref 40–54)
HCV AB SERPL QL IA: NORMAL
HGB BLD-MCNC: 13.4 G/DL (ref 14–18)
HIV 1+2 AB+HIV1 P24 AG SERPL QL IA: NORMAL
IMM GRANULOCYTES # BLD AUTO: 0.04 K/UL (ref 0–0.04)
IMM GRANULOCYTES NFR BLD AUTO: 0.5 % (ref 0–0.5)
LYMPHOCYTES # BLD AUTO: 2.5 K/UL (ref 1–4.8)
LYMPHOCYTES NFR BLD: 31.8 % (ref 18–48)
MCH RBC QN AUTO: 29.5 PG (ref 27–31)
MCHC RBC AUTO-ENTMCNC: 32.1 G/DL (ref 32–36)
MCV RBC AUTO: 92 FL (ref 82–98)
MONOCYTES # BLD AUTO: 1 K/UL (ref 0.3–1)
MONOCYTES NFR BLD: 12.7 % (ref 4–15)
NEUTROPHILS # BLD AUTO: 4 K/UL (ref 1.8–7.7)
NEUTROPHILS NFR BLD: 51.4 % (ref 38–73)
NRBC BLD-RTO: 0 /100 WBC
PLATELET # BLD AUTO: 306 K/UL (ref 150–450)
PMV BLD AUTO: 10.5 FL (ref 9.2–12.9)
POTASSIUM SERPL-SCNC: 4.2 MMOL/L (ref 3.5–5.1)
PROT SERPL-MCNC: 7.3 G/DL (ref 6–8.4)
RBC # BLD AUTO: 4.54 M/UL (ref 4.6–6.2)
SODIUM SERPL-SCNC: 140 MMOL/L (ref 136–145)
WBC # BLD AUTO: 7.86 K/UL (ref 3.9–12.7)

## 2024-06-15 PROCEDURE — 99285 EMERGENCY DEPT VISIT HI MDM: CPT | Mod: 25

## 2024-06-15 PROCEDURE — 85025 COMPLETE CBC W/AUTO DIFF WBC: CPT

## 2024-06-15 PROCEDURE — 25000003 PHARM REV CODE 250

## 2024-06-15 PROCEDURE — 86803 HEPATITIS C AB TEST: CPT | Performed by: PHYSICIAN ASSISTANT

## 2024-06-15 PROCEDURE — 80053 COMPREHEN METABOLIC PANEL: CPT

## 2024-06-15 PROCEDURE — 87389 HIV-1 AG W/HIV-1&-2 AB AG IA: CPT | Performed by: PHYSICIAN ASSISTANT

## 2024-06-15 RX ORDER — METHOCARBAMOL 500 MG/1
500 TABLET, FILM COATED ORAL 3 TIMES DAILY PRN
Qty: 30 TABLET | Refills: 0 | Status: SHIPPED | OUTPATIENT
Start: 2024-06-15 | End: 2024-06-25

## 2024-06-15 RX ORDER — OXYCODONE AND ACETAMINOPHEN 5; 325 MG/1; MG/1
1 TABLET ORAL
Status: COMPLETED | OUTPATIENT
Start: 2024-06-15 | End: 2024-06-15

## 2024-06-15 RX ORDER — METHOCARBAMOL 500 MG/1
500 TABLET, FILM COATED ORAL
Status: COMPLETED | OUTPATIENT
Start: 2024-06-15 | End: 2024-06-15

## 2024-06-15 RX ADMIN — OXYCODONE HYDROCHLORIDE AND ACETAMINOPHEN 1 TABLET: 5; 325 TABLET ORAL at 11:06

## 2024-06-15 RX ADMIN — METHOCARBAMOL 500 MG: 500 TABLET ORAL at 11:06

## 2024-06-15 NOTE — ED PROVIDER NOTES
Source of History:  Patient and chart    Chief complaint:  Leg Pain (Right leg pain near site of fem/pop, ambulatory but reports pain)      HPI:  Tan Gilliam is a 69 y.o. male with a medical history as below presents to the emergency department with a chief complaint of right leg pain.  Pt had received a popliteal artery bypass surgery three weeks ago.  Since that time he has experienced spastic muscle pain and posterior aching in his right leg from the ankle through the calf, behind the knee and thigh.  The patient denies any fevers, chills, or any other symptoms.  He has been compliant with his blood thinners.  He spoke with his surgical team he recommended he come to the ED for a CT of the leg.      Review of patient's allergies indicates:   Allergen Reactions    Influenza virus vaccines Other (See Comments)       No current facility-administered medications on file prior to encounter.     Current Outpatient Medications on File Prior to Encounter   Medication Sig Dispense Refill    amLODIPine (NORVASC) 5 MG tablet Take 5 mg by mouth.      aspirin (ECOTRIN) 81 MG EC tablet Take 81 mg by mouth once daily.      COREG 6.25 mg tablet Coreg 6.25 mg tablet, [RxNorm: 285954]      dupilumab (DUPIXENT PEN) 300 mg/2 mL PnIj Inject 2 mLs (300 mg total) into the skin every 14 (fourteen) days. 4 mL 11    HYDROcodone-acetaminophen (NORCO) 5-325 mg per tablet Take 1 tablet by mouth every 4 (four) hours as needed for Pain. 28 tablet 0    lisinopriL (PRINIVIL,ZESTRIL) 40 MG tablet TAKE 1 TABLET BY MOUTH ONCE DAILY IN THE EVENING 90 tablet 0    TRELEGY ELLIPTA 200-62.5-25 mcg inhaler INHALE 1 PUFF ONCE DAILY      XARELTO 20 mg Tab Xarelto 20 mg tablet, [RxNorm: 1070797]      adalimumab-adbm (CYLTEZO,CF, PEN) 40 mg/0.8 mL PnKt Inject 40 mg into the skin every 14 (fourteen) days. 2 kit 2    albuterol (PROVENTIL/VENTOLIN HFA) 90 mcg/actuation inhaler Inhale 2 puffs into the lungs every 6 (six) hours as needed for Wheezing. Rescue       ammonium lactate (LAC-HYDRIN) 12 % lotion Apply topically 2 (two) times daily. 1 each 10    naproxen (NAPROSYN) 500 MG tablet TAKE 1 TABLET BY MOUTH EVERY 12 HOURS WITH FOOD OR MILK AS NEEDED for 30      VENTOLIN HFA 90 mcg/actuation inhaler INHALE 2 PUFFS BY MOUTH EVERY 6 HOURS AS NEEDED FOR WHEEZING 18 g 0       PMH:  As per HPI and below:  Past Medical History:   Diagnosis Date    Asthma     CVA (cerebral vascular accident)     Hypertension      Past Surgical History:   Procedure Laterality Date    ANGIOGRAPHY Right 2024    Procedure: ANGIOGRAM - right lower extremity;  Surgeon: MOI Childers II, MD;  Location: [facility] OR [address] [place] [address];  Service: Vascular;  Laterality: Right;    COLONOSCOPY N/A 2019    Procedure: COLONOSCOPY;  Surgeon: Luis Bogran-Reyes, MD;  Location: [facility];  Service: Endoscopy;  Laterality: N/A;    CREATION OF FEMOROPOPLITEAL ARTERIAL BYPASS USING GRAFT Right 2024    Procedure: CREATION, BYPASS, ARTERIAL, FEMORAL TO POPLITEAL, USING GRAFT;  Surgeon: MOI Childers II, MD;  Location: [facility] OR [address] [place] [address];  Service: Vascular;  Laterality: Right;    HEMORRHOID SURGERY      NOSE SURGERY      TONSILLECTOMY         Social History     Socioeconomic History    Marital status:    Tobacco Use    Smoking status: Former     Current packs/day: 0.00     Types: Cigarettes, Cigars     Quit date:      Years since quittin.4    Smokeless tobacco: Never   Substance and Sexual Activity    Alcohol use: Yes     Alcohol/week: 6.0 standard drinks of alcohol     Types: 6 Cans of beer per week    Drug use: Never    Sexual activity: Not Currently     Social Determinants of Health     Financial Resource Strain: Low Risk  (2024)    Overall Financial Resource Strain (CARDIA)     Difficulty of Paying Living Expenses: Not hard at all   Food Insecurity: No Food Insecurity (2024)    Hunger Vital Sign     Worried About Running Out of Food in the Last Year: Never true     Ran Out of Food  in the Last Year: Never true   Transportation Needs: No Transportation Needs (5/22/2024)    TRANSPORTATION NEEDS     Transportation : No   Stress: No Stress Concern Present (5/22/2024)    [employer] - Occupational Stress Questionnaire     Feeling of Stress : Not at all   Housing Stability: Low Risk  (5/22/2024)    Housing Stability Vital Sign     Unable to Pay for Housing in the Last Year: No     Homeless in the Last Year: No       Family History   Problem Relation Name Age of Onset    Heart disease Mother      Asthma Mother      No Known Problems Father      Hypertension Sister      Hypertension Sister         Physical Exam:      Vitals:    06/15/24 1302   BP: (!) 159/81   Pulse: 64   Resp:    Temp: 98.2 °F (36.8 °C)     Physical Exam  Gen: hemodynamically stable in no acute distress  Mental Status: alert and oriented x 3.  Appropriate conversant.  Skin: Warm, dry. No rashes seen.  Eyes: No conjunctival injection.  Pulm: CTAB. No increased work of breathing.  No significant tachypnea.  No conversational dyspnea.    CV: Regular rate.   Abd: Soft.  Not distended.  Nontender.   MSK: right leg has two well healed incision scars.  Noticeable edema of the right leg.  TTP of the posterior aspect of the leg.  Neurovascularly intact   Neuro: Awake. Speech normal. No focal neuro deficit observed.      Procedures  Laboratory Studies:  Labs Reviewed   CBC W/ AUTO DIFFERENTIAL - Abnormal; Notable for the following components:       Result Value    RBC 4.54 (*)     Hemoglobin 13.4 (*)     All other components within normal limits   HIV 1 / 2 ANTIBODY    Narrative:     Release to patient->Immediate   HEPATITIS C ANTIBODY    Narrative:     Release to patient->Immediate   COMPREHENSIVE METABOLIC PANEL           Chart reviewed. Open approach right sided popliteal artery aneurysm was performed by Dr. Childers on 5/23/24.      Imaging Results              CT Leg (Tibia-Fibula) Without Contrast Right  (Final result)  Result time 06/15/24 16:06:48      Final result by Gabby Fine MD (06/15/24 16:06:48)                   Impression:      Limited evaluation of the popliteal bypass graph without the utilization of IV contrast, no definite complications seen.    Popliteal artery aneurysm, partially visualized on this tibia fibula  CT, the visualized portion do not appear changed from 04/29/2024.      Electronically signed by: Gabby Fine MD  Date:    06/15/2024  Time:    16:06               Narrative:    EXAMINATION:  CT LEG (TIBIA-FIBULA) WITHOUT CONTRAST RIGHT    CLINICAL HISTORY:  post op pain;    TECHNIQUE:  0.625 mm axial images of the right lower extremity, coronal and sagittal images reformatted.  Coronal and sagittal images reformatted.    COMPARISON:  CTA 04/29/2024    FINDINGS:  Limited evaluation of the graft and popliteal artery aneurysm due to the lack of administration of contrast.  The distal portion of the graft is seen.  No definite abnormality seen.  The aneurysm is partially seen 5.7 x 5.2 cm.    No abnormal subcutaneous soft tissue fluid collection.    Minimal stranding adjacent to the graft is a normal postop finding.    The knee joint appears normal.    No fracture, no osseous lesions.                                       US Lower Extremity Veins Right (Final result)  Result time 06/15/24 14:27:42      Final result by Olu Damon MD (06/15/24 14:27:42)                   Impression:      No evidence of deep venous thrombosis in the right lower extremity.      Electronically signed by: Olu Damon  Date:    06/15/2024  Time:    14:27               Narrative:    EXAMINATION:  US LOWER EXTREMITY VEINS RIGHT    CLINICAL HISTORY:  pain and swelling;    TECHNIQUE:  Duplex and color flow Doppler evaluation and graded compression of the right lower extremity veins was performed.    COMPARISON:  None    FINDINGS:  Right thigh veins: The common femoral, femoral, popliteal,  upper greater saphenous, and deep femoral veins are patent and free of thrombus. The veins are normally compressible and have normal phasic flow and augmentation response.    Right calf veins: The visualized calf veins are patent.    Contralateral CFV: The contralateral (left) common femoral vein is patent and free of thrombus.    Miscellaneous: None                                      Medications Given:  Medications   oxyCODONE-acetaminophen 5-325 mg per tablet 1 tablet (1 tablet Oral Given 6/15/24 1119)   methocarbamoL tablet 500 mg (500 mg Oral Given 6/15/24 1119)       Discussed with:  Vascular surgery Dr. Cerna.  His recommendations were to order the CT scan to look for signs of hematoma and discharge if negative.  I will comply with his recommendations.    MDM:    69 y.o. male with right leg pain    Differential includes but is not limited to hematoma, vascular compromise, DVT    Physical exam is reassuring.  No sign of infection, significant complication of the surgery.  Patient was experiencing some posterior leg pain.  DVT study is negative for signs of DVT.  CT scan shows no problem with the graft or any significant hematoma that would indicate a cause for the patient's physical discomfort.    Blood work is reassuring.  Patient has no leukocytosis or significant anemia.  CMP is within normal limits.    It is likely the patient is suffering from postoperative pain.  Discharge the patient with return precautions and a script for Robaxin.      Medical Decision Making  Amount and/or Complexity of Data Reviewed  Labs: ordered.  Radiology: ordered.    Risk  Prescription drug management.         Diagnostic Impression:    1. Post-operative pain         ED Disposition Condition    Discharge Stable          ED Prescriptions       Medication Sig Dispense Start Date End Date Auth. Provider    methocarbamoL (ROBAXIN) 500 MG Tab Take 1 tablet (500 mg total) by mouth 3 (three) times daily as needed. 30 tablet  6/15/2024 6/25/2024 Jerry Hopper MD          Follow-up Information       Follow up With Specialties Details Why Contact Info    vascular surgical team  In 2 weeks If symptoms worsen             Patient and/or family understands the plan and is in agreement, verbalized understanding, questions answered    V Mendez Hopper MD  Resident  Emergency Medicine         Jerry Hopper MD  Resident  06/15/24 1971

## 2024-06-15 NOTE — DISCHARGE INSTRUCTIONS
Please return to the emergency department if you develop any worsening pain, inability to walk, fever, chills, or any other symptoms that concern you.  Please follow up with your surgical team regarding your postop recovery.

## 2024-09-16 ENCOUNTER — HOSPITAL ENCOUNTER (OUTPATIENT)
Dept: VASCULAR SURGERY | Facility: CLINIC | Age: 69
Discharge: HOME | End: 2024-09-16
Attending: SURGERY
Payer: MEDICARE

## 2024-09-16 ENCOUNTER — OFFICE VISIT (OUTPATIENT)
Dept: VASCULAR SURGERY | Facility: CLINIC | Age: 69
End: 2024-09-16
Attending: SURGERY
Payer: MEDICARE

## 2024-09-16 VITALS
DIASTOLIC BLOOD PRESSURE: 85 MMHG | SYSTOLIC BLOOD PRESSURE: 138 MMHG | TEMPERATURE: 98 F | HEART RATE: 59 BPM | WEIGHT: 194 LBS | BODY MASS INDEX: 28.73 KG/M2 | HEIGHT: 69 IN

## 2024-09-16 DIAGNOSIS — I72.4 FEMORAL ARTERY ANEURYSM, BILATERAL: Primary | ICD-10-CM

## 2024-09-16 DIAGNOSIS — I72.4 BILATERAL POPLITEAL ARTERY ANEURYSM: ICD-10-CM

## 2024-09-16 DIAGNOSIS — I73.9 PVD (PERIPHERAL VASCULAR DISEASE): Primary | ICD-10-CM

## 2024-09-16 DIAGNOSIS — Z01.818 PRE-OP EVALUATION: Primary | ICD-10-CM

## 2024-09-16 DIAGNOSIS — I72.4 BILATERAL POPLITEAL ARTERY ANEURYSM: Primary | ICD-10-CM

## 2024-09-16 PROCEDURE — 99999 PR PBB SHADOW E&M-EST. PATIENT-LVL IV: CPT | Mod: PBBFAC,,, | Performed by: SURGERY

## 2024-09-16 PROCEDURE — 99214 OFFICE O/P EST MOD 30 MIN: CPT | Mod: PBBFAC,25 | Performed by: SURGERY

## 2024-09-16 PROCEDURE — 93926 LOWER EXTREMITY STUDY: CPT | Mod: PBBFAC | Performed by: SURGERY

## 2024-09-16 NOTE — PROGRESS NOTES
VASCULAR SURGERY NOTE    Patient ID: Tan Gilliam is a 69 y.o. male.    I. HISTORY     Chief Complaint: Femoral artery aneurysms     HPI: Tan Gilliam is a 69 y.o. 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 He discovered the aneurysms from CT imaging in the ER 3/16/24 for abdominal pain which was diagnosed diverticulitis. He reports feeling pulsating sensations in both legs for the past 3 months. He denies any pain or discomfort. He has not noticed this happening before. He states his activity level is normal, can walk up stairs, does yard work, mows lawns. Works as a . He denies chest pain, abdominal pain, nausea, vomiting, change in bowel habits.     ALLERGIES: Flu vaccine     SOCIAL HISTORY: previous smoker (quit 1991, several packs/day for 25 years)    FAMILY HISTORY:  Brother recently diagnosed with AAA    MEDICATIONS: Xarelto (prescribed for aneurysm), aspirin, coreg, amlodipine, lisiniopril, Humira pen    Interval History: 5/6/24  Presents for follow up.  Since this time he has had a CTA abdomen and pelvis with runoff.  Unfortunately poor quality.  CT showing bilateral popliteal aneurysms right greater than left. Right Popliteal Aneurysmal dilatation measuring up to  54 x 60 mm. Left Popliteal Aneurysmal dilatation of the popliteal artery measuring up to 38 x 42 mm.     Of note patient has discussing that he is having some tingling and pain in his calf and in his foot of his left lower extremity.  States it is mostly at night.  Worse with lying down, improved by walking.      Interval history 9/16/24:  Patient returns for clinic visit s/p right SFA to below-knee popliteal artery bypass with PTFE in May 2024. Patient says that he is feeling better since his last clinic appointment.  He has no pain at his lower extremity anymore.  He  has been walking regularly and does not have major limitations.  He complains of some pain around his right groin which is intermittent.  The pain is worse when he is sitting for prolonged periods of time.  He denies any more muscle spasms in his right calf after his last surgery.  Thankfully he has recovered quite well.    Past Medical History:   Diagnosis Date    Asthma     CVA (cerebral vascular accident)     Hypertension         Past Surgical History:   Procedure Laterality Date    ANGIOGRAPHY Right 2024    Procedure: ANGIOGRAM - right lower extremity;  Surgeon: MOI Childers II, MD;  Location: [facility] OR [address] [place] [address];  Service: Vascular;  Laterality: Right;    COLONOSCOPY N/A 2019    Procedure: COLONOSCOPY;  Surgeon: Luis Bogran-Reyes, MD;  Location: [facility];  Service: Endoscopy;  Laterality: N/A;    CREATION OF FEMOROPOPLITEAL ARTERIAL BYPASS USING GRAFT Right 2024    Procedure: CREATION, BYPASS, ARTERIAL, FEMORAL TO POPLITEAL, USING GRAFT;  Surgeon: MOI Childers II, MD;  Location: [facility] OR [address] [place] [address];  Service: Vascular;  Laterality: Right;    HEMORRHOID SURGERY      NOSE SURGERY      TONSILLECTOMY         Social History     Occupational History    Not on file   Tobacco Use    Smoking status: Former     Current packs/day: 0.00     Types: Cigarettes, Cigars     Quit date:      Years since quittin.7    Smokeless tobacco: Never   Substance and Sexual Activity    Alcohol use: Yes     Alcohol/week: 6.0 standard drinks of alcohol     Types: 6 Cans of beer per week    Drug use: Never    Sexual activity: Not Currently         Review of Systems   Constitutional: Negative for weight loss.   HENT:  Negative for ear pain and nosebleeds.    Eyes:  Negative for discharge and pain.   Cardiovascular:  Negative for chest pain and palpitations.   Respiratory:  Negative for cough, shortness of breath and wheezing.    Endocrine: Negative for cold intolerance, heat intolerance and polyphagia.    Hematologic/Lymphatic: Negative for adenopathy. Does not bruise/bleed easily.   Skin:  Negative for itching and rash.   Musculoskeletal:  Negative for joint swelling and muscle cramps.   Gastrointestinal:  Negative for abdominal pain, diarrhea, nausea and vomiting.   Genitourinary:  Negative for dysuria and flank pain.   Neurological:  Negative for numbness and seizures.         II. PHYSICAL EXAM     Physical Exam  Constitutional:       Appearance: Normal appearance. He is not ill-appearing or diaphoretic.   HENT:      Head: Normocephalic and atraumatic.   Eyes:      General: No scleral icterus.        Right eye: No discharge.         Left eye: No discharge.      Extraocular Movements: Extraocular movements intact.      Conjunctiva/sclera: Conjunctivae normal.   Cardiovascular:      Rate and Rhythm: Normal rate and regular rhythm.   Pulmonary:      Effort: Pulmonary effort is normal. No respiratory distress.   Musculoskeletal:         General: Normal range of motion.      Cervical back: Normal range of motion and neck supple.   Skin:     General: Skin is warm and dry.      Comments: Well healed scars on right medial leg/thigh   Neurological:      General: No focal deficit present.      Mental Status: He is alert and oriented to person, place, and time.   Psychiatric:         Mood and Affect: Mood normal.         Behavior: Behavior normal.       VASC:  RLE: 4+ palpable femoral pulse, aneurysm palpable, absent popliteal pulse; 1+ DP, Absent PT  LLE: 4+ palpable femoral pulse, aneurysm palpable, 2+ popliteal pulse ; 2+ DP, Absent PT    III. ASSESSMENT & PLAN (MEDICAL DECISION MAKING)       Imaging Results: (I have personally reviewed all images and provided interpretation below)  Arterial Duplex RLE 9/16/24: Patent RLE fem-pop bypass without evidence of stenosis.    CTA abdomen and pelvis with runoff.  Bilateral common femoral aneurysms.  Contrast runs out at about the level of his bilateral popliteal aneurysms.   This makes runoff not distinguishable.  Right Popliteal Aneurysmal dilatation measuring up to  54 x 60 mm. Left Popliteal Aneurysmal dilatation of the popliteal artery measuring up to 38 x 42 mm.       Assessment/Diagnosis and Plan:    1. Femoral artery aneurysm, bilateral        69 y.o. 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 Will plan for treatment of his remaining left popliteal artery aneurysm before treating his femoral aneurysms since his popliteal aneurysm has higher risk of emergent thrombotic events. Discussed risks, benefits,and alterntives to surgery with the patient and his son who expressed understanding and agreed to proceed.      - Plan for left popliteal artery aneurysm repair with femoral to popliteal artery bypass with prosthetic with left lower extremity angiogram to start to document run-off  -continue asa 81mg daily  -continue statin  -hold xarelto 48 hours pre-op  - Informed consent and blood consent obtained in clinic    MOI Childers II, MD, VI  Vascular Surgery  [facility]

## 2024-10-08 ENCOUNTER — TELEPHONE (OUTPATIENT)
Dept: VASCULAR SURGERY | Facility: CLINIC | Age: 69
End: 2024-10-08
Payer: MEDICARE

## 2024-10-08 NOTE — TELEPHONE ENCOUNTER
"Per Dr FROILAN Childers informed Vishnu(son) that his father's (Mr Gilliam) surgery has been rescheduled to 10/31/2024. Hold the Xarelto two days prior to surgery. Vishnu verbalized understanding, " I'm okay with October 31st and I will hold my father's Xarelto two days before his surgery."  "

## 2024-10-30 ENCOUNTER — TELEPHONE (OUTPATIENT)
Dept: VASCULAR SURGERY | Facility: CLINIC | Age: 69
End: 2024-10-30
Payer: MEDICARE

## 2024-10-30 ENCOUNTER — ANESTHESIA EVENT (OUTPATIENT)
Dept: SURGERY | Facility: HOSPITAL | Age: 69
End: 2024-10-30
Payer: MEDICARE

## 2024-10-30 NOTE — PRE-PROCEDURE INSTRUCTIONS
PREOP INSTRUCTIONS TO PATIENT'S SON:  No food,milk or milk products for 8 hours before surgery.  Clear liquids like water,gatorade,apple juice are allowed up until 2 hours before surgery.  Instructed to follow the surgeon's instructions if they differ from these.  Shower instructions as well as directions to the Surgery Center were given.  Encouraged to wear loose fitting,comfortable clothing.  Medication instructions for pm prior to and am of procedure reviewed.  Instructed to avoid taking vitamins,supplements and ibuprofen the morning of surgery.    Patient's son denies patient having any side effects or issues with anesthesia or sedation.

## 2024-10-31 ENCOUNTER — ANESTHESIA (OUTPATIENT)
Dept: SURGERY | Facility: HOSPITAL | Age: 69
End: 2024-10-31
Payer: MEDICARE

## 2024-10-31 ENCOUNTER — HOSPITAL ENCOUNTER (INPATIENT)
Facility: HOSPITAL | Age: 69
LOS: 1 days | Discharge: HOME | DRG: 254 | End: 2024-11-01
Attending: SURGERY | Admitting: SURGERY
Payer: MEDICARE

## 2024-10-31 DIAGNOSIS — I73.9 PAD (PERIPHERAL ARTERY DISEASE): ICD-10-CM

## 2024-10-31 LAB
ABO + RH BLD: NORMAL
BLD GP AB SCN CELLS X3 SERPL QL: NORMAL
POC ACTIVATED CLOTTING TIME K: 183 SEC (ref 74–137)
POC ACTIVATED CLOTTING TIME K: 232 SEC (ref 74–137)
POC ACTIVATED CLOTTING TIME K: 238 SEC (ref 74–137)
POC ACTIVATED CLOTTING TIME K: 244 SEC (ref 74–137)
POC ACTIVATED CLOTTING TIME K: 244 SEC (ref 74–137)
POC ACTIVATED CLOTTING TIME K: 256 SEC (ref 74–137)
SAMPLE: ABNORMAL
SPECIMEN OUTDATE: NORMAL

## 2024-10-31 PROCEDURE — 36000709 HC OR TIME LEV III EA ADD 15 MIN: Performed by: SURGERY

## 2024-10-31 PROCEDURE — B41GYZZ FLUOROSCOPY OF LEFT LOWER EXTREMITY ARTERIES USING OTHER CONTRAST: ICD-10-PCS | Performed by: SURGERY

## 2024-10-31 PROCEDURE — 20600001 HC STEP DOWN PRIVATE ROOM

## 2024-10-31 PROCEDURE — 25000003 PHARM REV CODE 250

## 2024-10-31 PROCEDURE — 63600175 PHARM REV CODE 636 W HCPCS

## 2024-10-31 PROCEDURE — 63600175 PHARM REV CODE 636 W HCPCS: Performed by: SURGERY

## 2024-10-31 PROCEDURE — 041L0JL BYPASS LEFT FEMORAL ARTERY TO POPLITEAL ARTERY WITH SYNTHETIC SUBSTITUTE, OPEN APPROACH: ICD-10-PCS | Performed by: SURGERY

## 2024-10-31 PROCEDURE — 94761 N-INVAS EAR/PLS OXIMETRY MLT: CPT

## 2024-10-31 PROCEDURE — C1757 CATH, THROMBECTOMY/EMBOLECT: HCPCS | Performed by: SURGERY

## 2024-10-31 PROCEDURE — 86850 RBC ANTIBODY SCREEN: CPT

## 2024-10-31 PROCEDURE — C1894 INTRO/SHEATH, NON-LASER: HCPCS | Performed by: SURGERY

## 2024-10-31 PROCEDURE — 25500020 PHARM REV CODE 255: Performed by: SURGERY

## 2024-10-31 PROCEDURE — 27201423 OPTIME MED/SURG SUP & DEVICES STERILE SUPPLY: Performed by: SURGERY

## 2024-10-31 PROCEDURE — 25000003 PHARM REV CODE 250: Performed by: STUDENT IN AN ORGANIZED HEALTH CARE EDUCATION/TRAINING PROGRAM

## 2024-10-31 PROCEDURE — 71000015 HC POSTOP RECOV 1ST HR: Performed by: SURGERY

## 2024-10-31 PROCEDURE — 71000033 HC RECOVERY, INTIAL HOUR: Performed by: SURGERY

## 2024-10-31 PROCEDURE — C1769 GUIDE WIRE: HCPCS | Performed by: SURGERY

## 2024-10-31 PROCEDURE — L8670 VASCULAR GRAFT, SYNTHETIC: HCPCS | Performed by: SURGERY

## 2024-10-31 PROCEDURE — 37000009 HC ANESTHESIA EA ADD 15 MINS: Performed by: SURGERY

## 2024-10-31 PROCEDURE — 36000708 HC OR TIME LEV III 1ST 15 MIN: Performed by: SURGERY

## 2024-10-31 PROCEDURE — 71000016 HC POSTOP RECOV ADDL HR: Performed by: SURGERY

## 2024-10-31 PROCEDURE — 25000003 PHARM REV CODE 250: Performed by: SURGERY

## 2024-10-31 PROCEDURE — 37000008 HC ANESTHESIA 1ST 15 MINUTES: Performed by: SURGERY

## 2024-10-31 DEVICE — PROPATEN VASCULAR GRAFT TW RR 8MMX40CM 30CM RINGS HEPARIN
Type: IMPLANTABLE DEVICE | Site: ARTERIAL | Status: FUNCTIONAL
Brand: GORE PROPATEN VASCULAR GRAFT

## 2024-10-31 RX ORDER — ONDANSETRON HYDROCHLORIDE 2 MG/ML
INJECTION, SOLUTION INTRAVENOUS
Status: DISCONTINUED | OUTPATIENT
Start: 2024-10-31 | End: 2024-10-31

## 2024-10-31 RX ORDER — HEPARIN SODIUM 1000 [USP'U]/ML
INJECTION, SOLUTION INTRAVENOUS; SUBCUTANEOUS
Status: DISCONTINUED | OUTPATIENT
Start: 2024-10-31 | End: 2024-10-31

## 2024-10-31 RX ORDER — DEXAMETHASONE SODIUM PHOSPHATE 4 MG/ML
INJECTION, SOLUTION INTRA-ARTICULAR; INTRALESIONAL; INTRAMUSCULAR; INTRAVENOUS; SOFT TISSUE
Status: DISCONTINUED | OUTPATIENT
Start: 2024-10-31 | End: 2024-10-31

## 2024-10-31 RX ORDER — ACETAMINOPHEN 10 MG/ML
INJECTION, SOLUTION INTRAVENOUS
Status: DISCONTINUED | OUTPATIENT
Start: 2024-10-31 | End: 2024-10-31

## 2024-10-31 RX ORDER — CALCIUM CHLORIDE 100 MG/ML
INJECTION, SOLUTION INTRAVENOUS
Status: DISCONTINUED | OUTPATIENT
Start: 2024-10-31 | End: 2024-10-31

## 2024-10-31 RX ORDER — SODIUM CHLORIDE 0.9 % (FLUSH) 0.9 %
10 SYRINGE (ML) INJECTION
Status: DISCONTINUED | OUTPATIENT
Start: 2024-10-31 | End: 2024-10-31 | Stop reason: HOSPADM

## 2024-10-31 RX ORDER — HYDROMORPHONE HYDROCHLORIDE 1 MG/ML
0.2 INJECTION, SOLUTION INTRAMUSCULAR; INTRAVENOUS; SUBCUTANEOUS EVERY 5 MIN PRN
Status: DISCONTINUED | OUTPATIENT
Start: 2024-10-31 | End: 2024-10-31 | Stop reason: HOSPADM

## 2024-10-31 RX ORDER — HALOPERIDOL 5 MG/ML
0.5 INJECTION INTRAMUSCULAR EVERY 10 MIN PRN
Status: DISCONTINUED | OUTPATIENT
Start: 2024-10-31 | End: 2024-10-31 | Stop reason: HOSPADM

## 2024-10-31 RX ORDER — ASPIRIN 81 MG/1
81 TABLET ORAL DAILY
Status: DISCONTINUED | OUTPATIENT
Start: 2024-11-01 | End: 2024-11-01 | Stop reason: HOSPADM

## 2024-10-31 RX ORDER — CEFAZOLIN SODIUM 1 G/3ML
INJECTION, POWDER, FOR SOLUTION INTRAMUSCULAR; INTRAVENOUS
Status: DISCONTINUED | OUTPATIENT
Start: 2024-10-31 | End: 2024-10-31

## 2024-10-31 RX ORDER — KETAMINE HCL IN 0.9 % NACL 50 MG/5 ML
SYRINGE (ML) INTRAVENOUS
Status: DISCONTINUED | OUTPATIENT
Start: 2024-10-31 | End: 2024-10-31

## 2024-10-31 RX ORDER — CARVEDILOL 6.25 MG/1
6.25 TABLET ORAL EVERY MORNING
Status: DISCONTINUED | OUTPATIENT
Start: 2024-11-01 | End: 2024-11-01 | Stop reason: HOSPADM

## 2024-10-31 RX ORDER — CEFAZOLIN 2 G/1
2 INJECTION, POWDER, FOR SOLUTION INTRAMUSCULAR; INTRAVENOUS
Status: DISCONTINUED | OUTPATIENT
Start: 2024-10-31 | End: 2024-10-31 | Stop reason: HOSPADM

## 2024-10-31 RX ORDER — PHENYLEPHRINE HCL IN 0.9% NACL 1 MG/10 ML
SYRINGE (ML) INTRAVENOUS
Status: DISCONTINUED | OUTPATIENT
Start: 2024-10-31 | End: 2024-10-31

## 2024-10-31 RX ORDER — IODIXANOL 320 MG/ML
INJECTION, SOLUTION INTRAVASCULAR
Status: DISCONTINUED | OUTPATIENT
Start: 2024-10-31 | End: 2024-10-31 | Stop reason: HOSPADM

## 2024-10-31 RX ORDER — OXYCODONE HYDROCHLORIDE 5 MG/1
5 TABLET ORAL EVERY 4 HOURS PRN
Status: DISCONTINUED | OUTPATIENT
Start: 2024-10-31 | End: 2024-11-01 | Stop reason: HOSPADM

## 2024-10-31 RX ORDER — LIDOCAINE HYDROCHLORIDE 20 MG/ML
INJECTION, SOLUTION EPIDURAL; INFILTRATION; INTRACAUDAL; PERINEURAL
Status: DISCONTINUED | OUTPATIENT
Start: 2024-10-31 | End: 2024-10-31

## 2024-10-31 RX ORDER — HEPARIN SOD,PORCINE/0.9 % NACL 1000/500ML
INTRAVENOUS SOLUTION INTRAVENOUS
Status: DISCONTINUED | OUTPATIENT
Start: 2024-10-31 | End: 2024-10-31 | Stop reason: HOSPADM

## 2024-10-31 RX ORDER — ONDANSETRON HYDROCHLORIDE 2 MG/ML
4 INJECTION, SOLUTION INTRAVENOUS EVERY 12 HOURS PRN
Status: DISCONTINUED | OUTPATIENT
Start: 2024-10-31 | End: 2024-11-01 | Stop reason: HOSPADM

## 2024-10-31 RX ORDER — PROPOFOL 10 MG/ML
VIAL (ML) INTRAVENOUS
Status: DISCONTINUED | OUTPATIENT
Start: 2024-10-31 | End: 2024-10-31

## 2024-10-31 RX ORDER — GLUCAGON 1 MG
1 KIT INJECTION
Status: DISCONTINUED | OUTPATIENT
Start: 2024-10-31 | End: 2024-10-31 | Stop reason: HOSPADM

## 2024-10-31 RX ORDER — MUPIROCIN 20 MG/G
OINTMENT TOPICAL 2 TIMES DAILY
Status: DISCONTINUED | OUTPATIENT
Start: 2024-10-31 | End: 2024-11-01 | Stop reason: HOSPADM

## 2024-10-31 RX ORDER — ALBUTEROL SULFATE 90 UG/1
2 INHALANT RESPIRATORY (INHALATION) EVERY 6 HOURS PRN
Status: DISCONTINUED | OUTPATIENT
Start: 2024-10-31 | End: 2024-10-31

## 2024-10-31 RX ORDER — CEFAZOLIN 2 G/1
2 INJECTION, POWDER, FOR SOLUTION INTRAMUSCULAR; INTRAVENOUS
Status: COMPLETED | OUTPATIENT
Start: 2024-11-01 | End: 2024-11-01

## 2024-10-31 RX ORDER — ACETAMINOPHEN 325 MG/1
650 TABLET ORAL EVERY 6 HOURS
Status: DISCONTINUED | OUTPATIENT
Start: 2024-10-31 | End: 2024-11-01 | Stop reason: HOSPADM

## 2024-10-31 RX ORDER — PROTAMINE SULFATE 10 MG/ML
INJECTION, SOLUTION INTRAVENOUS
Status: DISCONTINUED | OUTPATIENT
Start: 2024-10-31 | End: 2024-10-31

## 2024-10-31 RX ORDER — FENTANYL CITRATE 50 UG/ML
INJECTION, SOLUTION INTRAMUSCULAR; INTRAVENOUS
Status: DISCONTINUED | OUTPATIENT
Start: 2024-10-31 | End: 2024-10-31

## 2024-10-31 RX ORDER — ALBUTEROL SULFATE 90 UG/1
2 INHALANT RESPIRATORY (INHALATION) EVERY 6 HOURS PRN
Status: DISCONTINUED | OUTPATIENT
Start: 2024-10-31 | End: 2024-11-01 | Stop reason: HOSPADM

## 2024-10-31 RX ORDER — SODIUM CHLORIDE 9 MG/ML
INJECTION, SOLUTION INTRAVENOUS CONTINUOUS
Status: DISCONTINUED | OUTPATIENT
Start: 2024-10-31 | End: 2024-11-01

## 2024-10-31 RX ORDER — ROCURONIUM BROMIDE 10 MG/ML
INJECTION, SOLUTION INTRAVENOUS
Status: DISCONTINUED | OUTPATIENT
Start: 2024-10-31 | End: 2024-10-31

## 2024-10-31 RX ADMIN — HEPARIN SODIUM 3000 UNITS: 1000 INJECTION, SOLUTION INTRAVENOUS; SUBCUTANEOUS at 04:10

## 2024-10-31 RX ADMIN — PROPOFOL 20 MG: 10 INJECTION, EMULSION INTRAVENOUS at 02:10

## 2024-10-31 RX ADMIN — ROCURONIUM BROMIDE 30 MG: 10 INJECTION INTRAVENOUS at 02:10

## 2024-10-31 RX ADMIN — CALCIUM CHLORIDE 0.5 G: 100 INJECTION, SOLUTION INTRAVENOUS at 03:10

## 2024-10-31 RX ADMIN — Medication 100 MCG: at 02:10

## 2024-10-31 RX ADMIN — SODIUM CHLORIDE, SODIUM GLUCONATE, SODIUM ACETATE, POTASSIUM CHLORIDE, MAGNESIUM CHLORIDE, SODIUM PHOSPHATE, DIBASIC, AND POTASSIUM PHOSPHATE: .53; .5; .37; .037; .03; .012; .00082 INJECTION, SOLUTION INTRAVENOUS at 11:10

## 2024-10-31 RX ADMIN — MUPIROCIN: 20 OINTMENT TOPICAL at 09:10

## 2024-10-31 RX ADMIN — CEFAZOLIN 2 G: 330 INJECTION, POWDER, FOR SOLUTION INTRAMUSCULAR; INTRAVENOUS at 04:10

## 2024-10-31 RX ADMIN — Medication 20 MG: at 03:10

## 2024-10-31 RX ADMIN — Medication 100 MCG: at 03:10

## 2024-10-31 RX ADMIN — ROCURONIUM BROMIDE 30 MG: 10 INJECTION INTRAVENOUS at 01:10

## 2024-10-31 RX ADMIN — DEXAMETHASONE SODIUM PHOSPHATE 8 MG: 4 INJECTION INTRA-ARTICULAR; INTRALESIONAL; INTRAMUSCULAR; INTRAVENOUS; SOFT TISSUE at 12:10

## 2024-10-31 RX ADMIN — ROCURONIUM BROMIDE 50 MG: 10 INJECTION INTRAVENOUS at 11:10

## 2024-10-31 RX ADMIN — CALCIUM CHLORIDE 0.5 G: 100 INJECTION, SOLUTION INTRAVENOUS at 01:10

## 2024-10-31 RX ADMIN — FENTANYL CITRATE 100 MCG: 50 INJECTION, SOLUTION INTRAMUSCULAR; INTRAVENOUS at 11:10

## 2024-10-31 RX ADMIN — Medication 200 MCG: at 01:10

## 2024-10-31 RX ADMIN — ROCURONIUM BROMIDE 20 MG: 10 INJECTION INTRAVENOUS at 12:10

## 2024-10-31 RX ADMIN — ACETAMINOPHEN 1000 MG: 10 INJECTION, SOLUTION INTRAVENOUS at 02:10

## 2024-10-31 RX ADMIN — SUGAMMADEX 200 MG: 100 INJECTION, SOLUTION INTRAVENOUS at 05:10

## 2024-10-31 RX ADMIN — HEPARIN SODIUM 2000 UNITS: 1000 INJECTION, SOLUTION INTRAVENOUS; SUBCUTANEOUS at 02:10

## 2024-10-31 RX ADMIN — PROTAMINE SULFATE 5 MG: 10 INJECTION, SOLUTION INTRAVENOUS at 04:10

## 2024-10-31 RX ADMIN — ROCURONIUM BROMIDE 20 MG: 10 INJECTION INTRAVENOUS at 02:10

## 2024-10-31 RX ADMIN — CEFAZOLIN 2 G: 330 INJECTION, POWDER, FOR SOLUTION INTRAMUSCULAR; INTRAVENOUS at 12:10

## 2024-10-31 RX ADMIN — ROCURONIUM BROMIDE 20 MG: 10 INJECTION INTRAVENOUS at 03:10

## 2024-10-31 RX ADMIN — FENTANYL CITRATE 50 MCG: 50 INJECTION, SOLUTION INTRAMUSCULAR; INTRAVENOUS at 12:10

## 2024-10-31 RX ADMIN — HEPARIN SODIUM 2000 UNITS: 1000 INJECTION, SOLUTION INTRAVENOUS; SUBCUTANEOUS at 03:10

## 2024-10-31 RX ADMIN — Medication 20 MG: at 01:10

## 2024-10-31 RX ADMIN — PROTAMINE SULFATE 15 MG: 10 INJECTION, SOLUTION INTRAVENOUS at 04:10

## 2024-10-31 RX ADMIN — LIDOCAINE HYDROCHLORIDE 80 MG: 20 INJECTION, SOLUTION EPIDURAL; INFILTRATION; INTRACAUDAL at 11:10

## 2024-10-31 RX ADMIN — PROPOFOL 130 MG: 10 INJECTION, EMULSION INTRAVENOUS at 11:10

## 2024-10-31 RX ADMIN — HEPARIN SODIUM 9000 UNITS: 1000 INJECTION, SOLUTION INTRAVENOUS; SUBCUTANEOUS at 01:10

## 2024-10-31 RX ADMIN — ONDANSETRON 4 MG: 2 INJECTION INTRAMUSCULAR; INTRAVENOUS at 04:10

## 2024-10-31 RX ADMIN — HEPARIN SODIUM 2000 UNITS: 1000 INJECTION, SOLUTION INTRAVENOUS; SUBCUTANEOUS at 01:10

## 2024-10-31 RX ADMIN — Medication 10 MG: at 01:10

## 2024-10-31 NOTE — H&P
VASCULAR SURGERY NOTE    Patient ID: Tan Gilliam is a 69 y.o. male.    I. HISTORY     Chief Complaint: Femoral artery aneurysms     HPI: Tan Gilliam is a 69 y.o. 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 He discovered the aneurysms from CT imaging in the ER 3/16/24 for abdominal pain which was diagnosed diverticulitis. S/p right SFA to below-knee popliteal artery bypass with PTFE in May 2024. Presents today for femoral to popliteal arterial bypass of the left lower extremity.    ALLERGIES: Flu vaccine     SOCIAL HISTORY: previous smoker (quit 1991, several packs/day for 25 years)    FAMILY HISTORY:  Brother recently diagnosed with AAA    MEDICATIONS:   Current Outpatient Medications   Medication Instructions    adalimumab-adbm (CYLTEZO(CF) PEN) 40 mg, Subcutaneous, Every 14 days    albuterol (PROVENTIL/VENTOLIN HFA) 90 mcg/actuation inhaler 2 puffs, Every 6 hours PRN    amLODIPine (NORVASC) 5 mg, Oral, Daily    ammonium lactate (LAC-HYDRIN) 12 % lotion Topical (Top), 2 times daily    aspirin (ECOTRIN) 81 mg, Daily    COREG 6.25 mg tablet Every morning    DUPIXENT  mg, Subcutaneous, Every 14 days    HYDROcodone-acetaminophen (NORCO) 5-325 mg per tablet 1 tablet, Oral, Every 4 hours PRN    lisinopriL (PRINIVIL,ZESTRIL) 40 MG tablet TAKE 1 TABLET BY MOUTH ONCE DAILY IN THE EVENING    naproxen (NAPROSYN) 500 MG tablet TAKE 1 TABLET BY MOUTH EVERY 12 HOURS WITH FOOD OR MILK AS NEEDED for 30    TRELEGY ELLIPTA 200-62.5-25 mcg inhaler INHALE 1 PUFF ONCE DAILY    VENTOLIN HFA 90 mcg/actuation inhaler INHALE 2 PUFFS BY MOUTH EVERY 6 HOURS AS NEEDED FOR WHEEZING    XARELTO 20 mg Tab Xarelto 20 mg tablet, [RxNorm: 9993353]      Past Medical History:   Diagnosis Date    Asthma     CVA (cerebral vascular accident)      Hypertension         Past Surgical History:   Procedure Laterality Date    ANGIOGRAPHY Right 2024    Procedure: ANGIOGRAM - right lower extremity;  Surgeon: MOI Childers II, MD;  Location: [facility] OR [address] [place] [address];  Service: Vascular;  Laterality: Right;    COLONOSCOPY N/A 2019    Procedure: COLONOSCOPY;  Surgeon: Luis Bogran-Reyes, MD;  Location: [facility];  Service: Endoscopy;  Laterality: N/A;    CREATION OF FEMOROPOPLITEAL ARTERIAL BYPASS USING GRAFT Right 2024    Procedure: CREATION, BYPASS, ARTERIAL, FEMORAL TO POPLITEAL, USING GRAFT;  Surgeon: MOI Childers II, MD;  Location: [facility] OR [address] [place] [address];  Service: Vascular;  Laterality: Right;    HEMORRHOID SURGERY      NOSE SURGERY      TONSILLECTOMY         Social History     Occupational History    Not on file   Tobacco Use    Smoking status: Former     Current packs/day: 0.00     Types: Cigarettes, Cigars     Quit date:      Years since quittin.8    Smokeless tobacco: Never   Substance and Sexual Activity    Alcohol use: Yes     Alcohol/week: 6.0 standard drinks of alcohol     Types: 6 Cans of beer per week    Drug use: Never    Sexual activity: Not Currently         Review of Systems   Constitutional: Negative for weight loss.   HENT:  Negative for ear pain and nosebleeds.    Eyes:  Negative for discharge and pain.   Cardiovascular:  Negative for chest pain and palpitations.   Respiratory:  Negative for cough, shortness of breath and wheezing.    Endocrine: Negative for cold intolerance, heat intolerance and polyphagia.   Hematologic/Lymphatic: Negative for adenopathy. Does not bruise/bleed easily.   Skin:  Negative for itching and rash.   Musculoskeletal:  Negative for joint swelling and muscle cramps.   Gastrointestinal:  Negative for abdominal pain, diarrhea, nausea and vomiting.   Genitourinary:  Negative for dysuria and flank pain.   Neurological:  Negative for numbness and seizures.         II. PHYSICAL EXAM     Physical  Exam  Constitutional:       Appearance: Normal appearance. He is not ill-appearing or diaphoretic.   HENT:      Head: Normocephalic and atraumatic.   Eyes:      General: No scleral icterus.        Right eye: No discharge.         Left eye: No discharge.      Extraocular Movements: Extraocular movements intact.      Conjunctiva/sclera: Conjunctivae normal.   Cardiovascular:      Rate and Rhythm: Normal rate and regular rhythm.   Pulmonary:      Effort: Pulmonary effort is normal. No respiratory distress.   Musculoskeletal:         General: Normal range of motion.      Cervical back: Normal range of motion and neck supple.   Skin:     General: Skin is warm and dry.      Comments: Well healed scars on right medial leg/thigh   Neurological:      General: No focal deficit present.      Mental Status: He is alert and oriented to person, place, and time.   Psychiatric:         Mood and Affect: Mood normal.         Behavior: Behavior normal.       VASC:  LLE: DP and PT found on doppler, marked    III. ASSESSMENT & PLAN (MEDICAL DECISION MAKING)   Imaging Results:  Arterial Duplex RLE 9/16/24: Patent RLE fem-pop bypass without evidence of stenosis.    CTA abdomen and pelvis with runoff.  Bilateral common femoral aneurysms.  Contrast runs out at about the level of his bilateral popliteal aneurysms.  This makes runoff not distinguishable.  Right Popliteal Aneurysmal dilatation measuring up to  54 x 60 mm. Left Popliteal Aneurysmal dilatation of the popliteal artery measuring up to 38 x 42 mm.     Assessment/Diagnosis and Plan:    1. PAD (peripheral artery disease)        69 y.o. 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 Will plan for treatment of his remaining left popliteal artery aneurysm before treating his femoral aneurysms since his popliteal  aneurysm has higher risk of emergent thrombotic events.     - Plan to proceed to OR for left popliteal artery aneurysm repair with femoral to popliteal artery bypass with prosthetic with left lower extremity angiogram to start to document run-off  - held xarelto 48 hours pre-op  - Informed consent obtained in clinic. Blood consent obtained at bedside.  - Left side marked for laterality and pulses noted in left lower extremity.    Caridad Blount,   PGY-1

## 2024-10-31 NOTE — BRIEF OP NOTE
[facility] ([facility])  Brief Operative Note    SUMMARY     Surgery Date: 10/31/2024     Surgeons and Role:     * MOI Childers II, MD - Primary     * Val Lott MD - Resident - Assisting     * Pedro Tom MD - Resident - Assisting     * Mohamud Ye MD - Fellow        Pre-op Diagnosis:  Femoral artery aneurysm, bilateral [I72.4]    Post-op Diagnosis:  Post-Op Diagnosis Codes:     * Femoral artery aneurysm, bilateral [I72.4]    Procedure(s) (LRB):  CREATION, BYPASS, ARTERIAL, FEMORAL TO POPLITEAL, USING GRAFT (Left)    Anesthesia: General    Implants:  Implant Name Type Inv. Item Serial No.  Lot No. LRB No. Used Action   GRAFT THIN WALL 8 X 40CM - [id number]  GRAFT THIN WALL 8 X 40CM [id number] [employer]  Left 1 Implanted       Operative Findings: SFA to below knee popliteal artery with 8 mm PTFE. Biphasic PT at end of case. Ligation of popliteal artery aneurysm distal to SFA anastomosis and proximal to pop artery anastomosis.     Estimated Blood Loss: 400 mL    Estimated Blood Loss has been documented.         Specimens:   Specimen (24h ago, onward)      None            [id number]

## 2024-11-01 VITALS
DIASTOLIC BLOOD PRESSURE: 70 MMHG | OXYGEN SATURATION: 95 % | SYSTOLIC BLOOD PRESSURE: 124 MMHG | TEMPERATURE: 98 F | HEART RATE: 69 BPM | RESPIRATION RATE: 22 BRPM

## 2024-11-01 DIAGNOSIS — I73.9 PVD (PERIPHERAL VASCULAR DISEASE): Primary | ICD-10-CM

## 2024-11-01 PROBLEM — I72.4: Status: ACTIVE | Noted: 2024-11-01

## 2024-11-01 LAB
ANION GAP SERPL CALC-SCNC: 11 MMOL/L (ref 8–16)
BASOPHILS # BLD AUTO: 0.01 K/UL (ref 0–0.2)
BASOPHILS NFR BLD: 0.1 % (ref 0–1.9)
BUN SERPL-MCNC: 16 MG/DL (ref 8–23)
CALCIUM SERPL-MCNC: 8.8 MG/DL (ref 8.7–10.5)
CHLORIDE SERPL-SCNC: 104 MMOL/L (ref 95–110)
CO2 SERPL-SCNC: 22 MMOL/L (ref 23–29)
CREAT SERPL-MCNC: 1.2 MG/DL (ref 0.5–1.4)
DIFFERENTIAL METHOD BLD: ABNORMAL
EOSINOPHIL # BLD AUTO: 0 K/UL (ref 0–0.5)
EOSINOPHIL NFR BLD: 0 % (ref 0–8)
ERYTHROCYTE [DISTWIDTH] IN BLOOD BY AUTOMATED COUNT: 13.1 % (ref 11.5–14.5)
EST. GFR  (NO RACE VARIABLE): >60 ML/MIN/1.73 M^2
GLUCOSE SERPL-MCNC: 143 MG/DL (ref 70–110)
HCT VFR BLD AUTO: 33.8 % (ref 40–54)
HGB BLD-MCNC: 11 G/DL (ref 14–18)
IMM GRANULOCYTES # BLD AUTO: 0.04 K/UL (ref 0–0.04)
IMM GRANULOCYTES NFR BLD AUTO: 0.3 % (ref 0–0.5)
LYMPHOCYTES # BLD AUTO: 1.3 K/UL (ref 1–4.8)
LYMPHOCYTES NFR BLD: 11.2 % (ref 18–48)
MCH RBC QN AUTO: 27.9 PG (ref 27–31)
MCHC RBC AUTO-ENTMCNC: 32.5 G/DL (ref 32–36)
MCV RBC AUTO: 86 FL (ref 82–98)
MONOCYTES # BLD AUTO: 0.8 K/UL (ref 0.3–1)
MONOCYTES NFR BLD: 6.9 % (ref 4–15)
NEUTROPHILS # BLD AUTO: 9.5 K/UL (ref 1.8–7.7)
NEUTROPHILS NFR BLD: 81.5 % (ref 38–73)
NRBC BLD-RTO: 0 /100 WBC
PLATELET # BLD AUTO: 218 K/UL (ref 150–450)
PMV BLD AUTO: 9.9 FL (ref 9.2–12.9)
POTASSIUM SERPL-SCNC: 4.4 MMOL/L (ref 3.5–5.1)
RBC # BLD AUTO: 3.94 M/UL (ref 4.6–6.2)
SODIUM SERPL-SCNC: 137 MMOL/L (ref 136–145)
WBC # BLD AUTO: 11.6 K/UL (ref 3.9–12.7)

## 2024-11-01 PROCEDURE — 80048 BASIC METABOLIC PNL TOTAL CA: CPT | Performed by: STUDENT IN AN ORGANIZED HEALTH CARE EDUCATION/TRAINING PROGRAM

## 2024-11-01 PROCEDURE — 25000003 PHARM REV CODE 250: Performed by: STUDENT IN AN ORGANIZED HEALTH CARE EDUCATION/TRAINING PROGRAM

## 2024-11-01 PROCEDURE — 85025 COMPLETE CBC W/AUTO DIFF WBC: CPT | Performed by: STUDENT IN AN ORGANIZED HEALTH CARE EDUCATION/TRAINING PROGRAM

## 2024-11-01 PROCEDURE — 63600175 PHARM REV CODE 636 W HCPCS: Performed by: STUDENT IN AN ORGANIZED HEALTH CARE EDUCATION/TRAINING PROGRAM

## 2024-11-01 RX ORDER — HEPARIN SODIUM 5000 [USP'U]/ML
5000 INJECTION, SOLUTION INTRAVENOUS; SUBCUTANEOUS EVERY 8 HOURS
Status: DISCONTINUED | OUTPATIENT
Start: 2024-11-01 | End: 2024-11-01 | Stop reason: HOSPADM

## 2024-11-01 RX ORDER — DOCUSATE SODIUM 100 MG/1
100 CAPSULE, LIQUID FILLED ORAL 2 TIMES DAILY PRN
Qty: 30 CAPSULE | Refills: 0 | Status: SHIPPED | OUTPATIENT
Start: 2024-11-01

## 2024-11-01 RX ORDER — ACETAMINOPHEN 325 MG/1
650 TABLET ORAL EVERY 6 HOURS PRN
Qty: 30 TABLET | Refills: 0 | Status: SHIPPED | OUTPATIENT
Start: 2024-11-01

## 2024-11-01 RX ORDER — OXYCODONE HYDROCHLORIDE 5 MG/1
5 TABLET ORAL EVERY 6 HOURS PRN
Qty: 24 TABLET | Refills: 0 | Status: SHIPPED | OUTPATIENT
Start: 2024-11-01

## 2024-11-01 RX ORDER — RIVAROXABAN 20 MG/1
20 TABLET, FILM COATED ORAL DAILY
Qty: 60 TABLET | Refills: 0 | Status: SHIPPED | OUTPATIENT
Start: 2024-11-02

## 2024-11-01 RX ORDER — RIVAROXABAN 20 MG/1
20 TABLET, FILM COATED ORAL 2 TIMES DAILY WITH MEALS
Qty: 60 TABLET | Refills: 0 | Status: SHIPPED | OUTPATIENT
Start: 2024-11-02 | End: 2024-11-01

## 2024-11-01 RX ADMIN — ACETAMINOPHEN 650 MG: 325 TABLET ORAL at 12:11

## 2024-11-01 RX ADMIN — CEFAZOLIN 2 G: 2 INJECTION, POWDER, FOR SOLUTION INTRAMUSCULAR; INTRAVENOUS at 07:11

## 2024-11-01 RX ADMIN — CEFAZOLIN 2 G: 2 INJECTION, POWDER, FOR SOLUTION INTRAMUSCULAR; INTRAVENOUS at 12:11

## 2024-11-01 RX ADMIN — MUPIROCIN: 20 OINTMENT TOPICAL at 08:11

## 2024-11-01 RX ADMIN — ACETAMINOPHEN 650 MG: 325 TABLET ORAL at 06:11

## 2024-11-01 RX ADMIN — CARVEDILOL 6.25 MG: 6.25 TABLET, FILM COATED ORAL at 06:11

## 2024-11-01 RX ADMIN — ASPIRIN 81 MG: 81 TABLET, COATED ORAL at 08:11

## 2024-11-01 NOTE — DISCHARGE SUMMARY
[facility] - [facility] Intensive Care  Vascular Surgery  Discharge Summary      Patient Name: Tan Gilliam  MRN: [id number]  Admission Date: 10/31/2024  Hospital Length of Stay: 1 days  Discharge Date and Time:  11/01/2024 11:04 AM  Attending Physician: MOI Childers II, MD   Discharging Provider: Yancy Mendoza NP  Primary Care Provider: Cory Che MD    HPI:   No notes on file    Procedure(s) (LRB):  CREATION, BYPASS, ARTERIAL, FEMORAL TO POPLITEAL, USING GRAFT (Left)     Hospital Course: For details of hospital stay, please refer to daily progress notes. Briefly, this is a 69 y.o. male presented on 10/31 for planned surgical intervention for right femoral pseudoaneurysm. Patient was taken to OR and underwent L SFA to below knee pop bypass and ligation of L popliteal aneurysm . Post-operatively patient was admitted to the floor and had an uncomplicated hospital recovery.     On the day of discharge, the patient was ambulating without difficulty, voiding spontaneously, was tolerating a diet without nausea or vomiting, and pain was well controlled on PO pain medications. Discharge instructions were explained to the patient and appropriate follow-up was arranged.            Goals of Care Treatment Preferences:  Code Status: Full Code      Consults:     Significant Diagnostic Studies: Labs: CMP   Recent Labs   Lab 11/01/24  0230      K 4.4      CO2 22*   *   BUN 16   CREATININE 1.2   CALCIUM 8.8   ANIONGAP 11    and CBC   Recent Labs   Lab 11/01/24  0230   WBC 11.60   HGB 11.0*   HCT 33.8*          Pending Diagnostic Studies:       None          Final Active Diagnoses:    Diagnosis Date Noted POA    PRINCIPAL PROBLEM:  Femoral artery aneurysm, left [I72.4] 11/01/2024 Unknown      Problems Resolved During this Admission:      Discharged Condition: good    Disposition: Home or Self Care    Follow Up:   Follow-up Information       MOI Childers II, MD Follow up in 2 week(s).     Specialty: Vascular Surgery  Contact information:  [address] [facility] [address]  [phone]                             Patient Instructions:      Diet Cardiac     Notify your health care provider if you experience any of the following:     Notify your health care provider if you experience any of the following:  temperature >100.4     Notify your health care provider if you experience any of the following:  persistent nausea and vomiting or diarrhea     Notify your health care provider if you experience any of the following:  severe uncontrolled pain     Notify your health care provider if you experience any of the following:  redness, tenderness, or signs of infection (pain, swelling, redness, odor or green/yellow discharge around incision site)     Notify your health care provider if you experience any of the following:  difficulty breathing or increased cough     Notify your health care provider if you experience any of the following:  increased confusion or weakness     Notify your health care provider if you experience any of the following:  persistent dizziness, light-headedness, or visual disturbances     Notify your health care provider if you experience any of the following:  worsening rash     Notify your health care provider if you experience any of the following:  severe persistent headache     No dressing needed   Order Comments: No dressing needed, can cover with gauze and tape if drainage occurs.     Activity as tolerated     Medications:  Reconciled Home Medications:      Medication List        START taking these medications      acetaminophen 325 MG tablet  Commonly known as: TYLENOL  Take 2 tablets (650 mg total) by mouth every 6 (six) hours as needed for Pain.     docusate sodium 100 MG capsule  Commonly known as: COLACE  Take 1 capsule (100 mg total) by mouth 2 (two) times daily as needed for Constipation (please take while taking opioid pain medication).     oxyCODONE 5 MG  immediate release tablet  Commonly known as: ROXICODONE  Take 1 tablet (5 mg total) by mouth every 6 (six) hours as needed for Pain (severe pain unrelieved by acetaminophen).            CHANGE how you take these medications      XARELTO 20 mg Tab  Generic drug: rivaroxaban  Take 1 tablet (20 mg total) by mouth daily with meals.   Start taking on: November 2, 2024  What changed: See the new instructions.            CONTINUE taking these medications      adalimumab-adbm 40 mg/0.8 mL Pnkt  Commonly known as: CYLTEZO(CF) PEN  Inject 0.8 mLs (40 mg total) into the skin every 14 (fourteen) days.     amLODIPine 5 MG tablet  Commonly known as: NORVASC  Take 5 mg by mouth once daily.     ammonium lactate 12 % lotion  Commonly known as: LAC-HYDRIN  Apply topically 2 (two) times daily.     aspirin 81 MG EC tablet  Commonly known as: ECOTRIN  Take 81 mg by mouth once daily.     COREG 6.25 mg tablet  Generic drug: carvediloL  Take by mouth every morning.     DUPIXENT  mg/2 mL Pnij  Generic drug: dupilumab  Inject 2 mLs (300 mg total) into the skin every 14 (fourteen) days.     HYDROcodone-acetaminophen 5-325 mg per tablet  Commonly known as: NORCO  [place] melissa tableta por vía oral cada 4 horas según sea necesario para el dolor.  (Take 1 tablet by mouth every 4 (four) hours as needed for Pain.)     lisinopriL 40 MG tablet  Commonly known as: PRINIVIL,ZESTRIL  TAKE 1 TABLET BY MOUTH ONCE DAILY IN THE EVENING     naproxen 500 MG tablet  Commonly known as: NAPROSYN  TAKE 1 TABLET BY MOUTH EVERY 12 HOURS WITH FOOD OR MILK AS NEEDED for 30     TRELEGY ELLIPTA 200-62.5-25 mcg inhaler  Generic drug: fluticasone-umeclidin-vilanter  INHALE 1 PUFF ONCE DAILY     * albuterol 90 mcg/actuation inhaler  Commonly known as: PROVENTIL/VENTOLIN HFA  Inhale 2 puffs into the lungs every 6 (six) hours as needed for Wheezing. Rescue     * VENTOLIN HFA 90 mcg/actuation inhaler  Generic drug: albuterol  INHALE 2 PUFFS BY MOUTH EVERY 6 HOURS AS  NEEDED FOR WHEEZING           * This list has 2 medication(s) that are the same as other medications prescribed for you. Read the directions carefully, and ask your doctor or other care provider to review them with you.                  Yancy Mendoza NP  Vascular Surgery  [facility]

## 2024-11-01 NOTE — NURSING TRANSFER
Nursing Transfer Note      10/31/2024   8:11 PM    Nurse giving handoff:LINUS Ozuna PACU  Nurse receiving handoff:LINUS Nieto PCU    Reason patient is being transferred: post anesthesia    Transfer To: [id number]    Transfer via stretcher    Transported by RN    Transfer Vital Signs:  Blood Pressure:122/73  Heart Rate:67  O2:96%-- RA  Temperature:  Respirations:14    Additional Lines: Li Catheter    Medicines sent: n/a    Any special needs or follow-up needed: n/a    Patient belongings transferred with patient: No    Chart send with patient: Yes    Notified: son    Patient reassessed at: 10/31/24@ 1930     Upon arrival to floor: cardiac monitor applied, patient oriented to room, call bell in reach, and bed in lowest position

## 2024-11-01 NOTE — SUBJECTIVE & OBJECTIVE
Medications:  Continuous Infusions:   0.9% NaCl   Intravenous Continuous         Scheduled Meds:   acetaminophen  650 mg Oral Q6H    aspirin  81 mg Oral Daily    carvediloL  6.25 mg Oral QAM    mupirocin   Nasal BID     PRN Meds:  Current Facility-Administered Medications:     albuterol, 2 puff, Inhalation, Q6H PRN    dextrose 10%, 12.5 g, Intravenous, PRN    dextrose 10%, 25 g, Intravenous, PRN    ondansetron, 4 mg, Intravenous, Q12H PRN    oxyCODONE, 5 mg, Oral, Q4H PRN     Objective:     Vital Signs (Most Recent):  Temp: 98.3 °F (36.8 °C) (11/01/24 0701)  Pulse: 68 (11/01/24 0729)  Resp: 19 (11/01/24 0701)  BP: 115/75 (11/01/24 0701)  SpO2: 95 % (11/01/24 0701) Vital Signs (24h Range):  Temp:  [97.2 °F (36.2 °C)-98.3 °F (36.8 °C)] 98.3 °F (36.8 °C)  Pulse:  [57-81] 68  Resp:  [14-24] 19  SpO2:  [91 %-100 %] 95 %  BP: ()/(57-89) 115/75  Arterial Line BP: (106-141)/(49-74) 111/58          Physical Exam  HENT:      Mouth/Throat:      Mouth: Mucous membranes are moist.   Cardiovascular:      Rate and Rhythm: Normal rate.      Comments: Palp L DP  Pulmonary:      Effort: Pulmonary effort is normal.   Abdominal:      Palpations: Abdomen is soft.   Musculoskeletal:         General: Normal range of motion.      Cervical back: Normal range of motion.   Skin:     General: Skin is warm.      Capillary Refill: Capillary refill takes less than 2 seconds.      Comments: Incision to upper left leg open to air, c/d/I, minimal erythema/ drainage   Incision to lower left leg open to air, c/d/I, minimal erythema/ drainage    Neurological:      General: No focal deficit present.      Mental Status: He is alert and oriented to person, place, and time.          Significant Labs:  CBC:   Recent Labs   Lab 11/01/24  0230   WBC 11.60   RBC 3.94*   HGB 11.0*   HCT 33.8*      MCV 86   MCH 27.9   MCHC 32.5     CMP:   Recent Labs   Lab 11/01/24  0230   *   CALCIUM 8.8      K 4.4   CO2 22*      BUN 16    CREATININE 1.2       Significant Diagnostics:  I have reviewed all pertinent imaging results/findings within the past 24 hours.

## 2024-11-01 NOTE — PLAN OF CARE
CHW left a voice message for pt's primary care office to contact pt to schedule his hospital f/u visit.

## 2024-11-01 NOTE — PLAN OF CARE
[facility] - Surgical Intensive Care  Discharge Final Note    Primary Care Provider: Cory Che MD    Expected Discharge Date: 11/1/2024    Final Discharge Note (most recent)       Final Note - 11/01/24 1637          Final Note    Assessment Type Final Discharge Note (P)      Anticipated Discharge Disposition Home or Self Care (P)      What phone number can be called within the next 1-3 days to see how you are doing after discharge? [phone] (P)         Post-Acute Status    Post-Acute Authorization Other (P)      Coverage MEDICARE - MEDICARE PART A & B - (P)      Other Status Awaiting f/u Appts (P)                      Important Message from Medicare             Contact Info       MOI Childers II, MD   Specialty: Vascular Surgery    [address] [facility] [address]   Phone: [phone]       Next Steps: Follow up in 2 week(s)    Cory Che MD   Specialty: Internal Medicine   Relationship: PCP - General    [address] [place] [address] & [facility] [address]   Phone: [phone]       Next Steps: Follow up    Instructions: CHW left a voice message for pt's primary care office to contact pt to schedule his hospital f/u visit.          Patient discharged home w/ family via personal vehicle.                MARIXA Smith, SW  [facility]  Case Management  Ext. [phone]

## 2024-11-01 NOTE — PROGRESS NOTES
[facility] Int[facility]  Vascular Surgery  Progress Note    Patient Name: Tan Gilliam  MRN: [id number]  Admission Date: 10/31/2024  Primary Care Provider: Cory Che MD    Subjective:     Interval History: Patient did well overnight, denies pain, DP palpable, feet warm with full motor/ sensation intact.  Incisions are well- appearing.  Patient should be ready for discharge later today.    Post-Op Info:  Procedure(s) (LRB):  CREATION, BYPASS, ARTERIAL, FEMORAL TO POPLITEAL, USING GRAFT (Left)   1 Day Post-Op     Medications:  Continuous Infusions:   0.9% NaCl   Intravenous Continuous         Scheduled Meds:   acetaminophen  650 mg Oral Q6H    aspirin  81 mg Oral Daily    carvediloL  6.25 mg Oral QAM    mupirocin   Nasal BID     PRN Meds:  Current Facility-Administered Medications:     albuterol, 2 puff, Inhalation, Q6H PRN    dextrose 10%, 12.5 g, Intravenous, PRN    dextrose 10%, 25 g, Intravenous, PRN    ondansetron, 4 mg, Intravenous, Q12H PRN    oxyCODONE, 5 mg, Oral, Q4H PRN     Objective:     Vital Signs (Most Recent):  Temp: 98.3 °F (36.8 °C) (11/01/24 0701)  Pulse: 68 (11/01/24 0729)  Resp: 19 (11/01/24 0701)  BP: 115/75 (11/01/24 0701)  SpO2: 95 % (11/01/24 0701) Vital Signs (24h Range):  Temp:  [97.2 °F (36.2 °C)-98.3 °F (36.8 °C)] 98.3 °F (36.8 °C)  Pulse:  [57-81] 68  Resp:  [14-24] 19  SpO2:  [91 %-100 %] 95 %  BP: ()/(57-89) 115/75  Arterial Line BP: (106-141)/(49-74) 111/58          Physical Exam  HENT:      Mouth/Throat:      Mouth: Mucous membranes are moist.   Cardiovascular:      Rate and Rhythm: Normal rate.      Comments: Palp L DP  Pulmonary:      Effort: Pulmonary effort is normal.   Abdominal:      Palpations: Abdomen is soft.   Musculoskeletal:         General: Normal range of motion.      Cervical back: Normal range of motion.   Skin:     General: Skin is warm.      Capillary Refill: Capillary refill takes less than 2 seconds.      Comments: Incision to upper left leg  open to air, c/d/I, minimal erythema/ drainage   Incision to lower left leg open to air, c/d/I, minimal erythema/ drainage    Neurological:      General: No focal deficit present.      Mental Status: He is alert and oriented to person, place, and time.          Significant Labs:  CBC:   Recent Labs   Lab 11/01/24  0230   WBC 11.60   RBC 3.94*   HGB 11.0*   HCT 33.8*      MCV 86   MCH 27.9   MCHC 32.5     CMP:   Recent Labs   Lab 11/01/24  0230   *   CALCIUM 8.8      K 4.4   CO2 22*      BUN 16   CREATININE 1.2       Significant Diagnostics:  I have reviewed all pertinent imaging results/findings within the past 24 hours.  Assessment/Plan:   Mr. Gilliam is a 69- year old male with history of bilateral femoral aneurysms.  He is now s/p SFA to below knee pop bypass and ligation of L popliteal artery aneurysm on 10/31.     - ASA daily  - Can restart Xarelto tomorrow   - Pain reg, bowel reg   - OK to keep incisions open to air  - Regular diet  - Out of bed, assist to ambulate  - DC farshad thompson-line         Dispo:  DC today after patient voids/ walks/ eats           Yancy Mendoza NP  Vascular Surgery  [facility]

## 2024-11-01 NOTE — OP NOTE
Vascular Surgery Op Note     Date of Operation/Procedure: 10/31/24     Pre-operative Diagnosis: Left popliteal artery aneurysm 5 cm     Post-operative Diagnosis: same     Anesthesia: general     Operation/Procedure Performed:   Left lower extremity angiogram  Left superficial femoral to below knee popliteal artery bypass using 8mm ringed PTFE      Attending Surgeon: MOI Childers II, MD     Resident/Fellow: Mohamud Ye DO [id number]     Indications:   68yo M with bilateral large popliteal artery aneurysms s/p right femoropopliteal bypass now presenting for repair of left. We will plan for a left lower extremity angiogram to adequately visualize the arteries below-the-knee.  Based on that angiogram we will decide whether open repair with bypass versus endovascular repair with stenting is appropriate.        Procedure in Detail:   The patient was brought to the operating room supine position.  The left lower extremity was prepped and draped circumferentially in normal sterile fashion.  A time-out was performed to confirm appropriate patient identification, procedure, laterality.       We began by making a longitudinal incision over the left mid thigh on the medial aspect over the superficial femoral artery.  This is deepened the combination of electrocautery, blunt dissection, sharp dissection.  We eventually dissected out the SFA circumferentially. Small branches were ligated with 2-0 silk suture. The artery was healthy without any aneurysmal dilation or significant disease.  We then accessed the artery with a micropuncture access kit.  Over wire, a 25 cm 5 Fr sheath as advanced under fluoroscopy to the level of the aneurysm. A angiogram was performed of the left lower extremity from the below-knee popliteal artery through the catheter.  Angiography showed widely patent below-knee popliteal artery with single-vessel runoff via the peroneal artery with excellent arborization to pedal arch.  There was no apparent  stenosis in the peroneal artery.  Based on these findings, we proceeded with open repair.     We then made a longitudinal incision just below the medial aspect of the patient's left knee.  The incision was deepened with a combination of electrocautery, blunt dissection, sharp dissection.  The gastroc was retracted posteriorly and the soleal attachments to the tibia were divided with electrocautery.  We were able to expose the below-knee popliteal artery and dissected it free of the popliteal veins dissecting circumferentially and then dissecting out both proximally and distally.  Once we had adequate exposure of the below-knee popliteal artery we then created a tunnel from the proximal to the distal incision using a short [place] tunneler.  We brought a 8 mm ringed PTFE graft through the tunnel.  We then administered IV heparin for systemic anticoagulation.  The SFA was clamped proximally and distally within the incision.  We made a longitudinal arteriotomy in the SFA.  The graft was beveled to fit the arteriotomy and was sewn onto the artery with running 5 0 Prolene suture.  After the anastomosis was completed the clamps were released and there was brisk flow through the graft.  The graft was retrograde flushed with heparinized saline and the graft was clamped proximally with a Lele Hydragrip clamp.  We then turned our attention to the distal incision where clamps were applied proximally and distally to the below-knee popliteal artery.  A longitudinal arteriotomy was made in the below-knee popliteal artery with an 11 blade and extended with Malik scissors.  We then beveled the 8 mm PTFE graft to fit the arteriotomy and sewed the graft onto the artery with running 6 0 Prolene suture.  Prior to completion of the anastomosis the below-knee popliteal artery clamps were briefly released and there was adequate backbleeding with no apparent thrombus.  The clamps were reapplied and the graft clamp was briefly released as  well with brisk flow through the graft.  We then flushed the lumen with heparinized saline and reapplied outflow clamps and completed the anastomosis.  Finally after the anastomosis was completed we released the proximal clamp on the below-knee popliteal artery and the clamp on the graft to retrograde flush the bypass.  We then ligated the superficial femoral artery beyond the origin of the bypass graft with a 0 silk tie.  We then ligated the below-knee popliteal artery proximal to the distal anastomosis with a 0 silk tie.  Now that the aneurysm had been completely excluded we checked distal signals.  Unfortunately, at this time, we had no pedal signals and had obstructive signal distal to the anastomosis. A transverse incision was made on the distal portion of the graft and 3Fr aleah balloon was passed distally with no apparent thrombus. We then proceeded to access the proximal portion of the graft to perform on table angiography. A 6-0 Prolene U stich was placed around our planned access site and then proceeded with access and exchange for 4 Fr micro catheter. Angiography of the graft, below knee popliteal artery and peroneal showed brisk flow, no thrombus, and arborization of the pedal arch similar to our angiography from beginning of the case. Satisfied there were no thromboembolic events or technical issues, we proceeded to removed wires and catheter and close the access site. The patient was being supported with vasopressors from anesthesia colleagues, which when this was stopped in favor of fluid resuscitation, the patient regained excellent PT signal. Satisfied with our result we then reversed heparin with protamine.  Both incisions were thoroughly irrigated with saline.  Thrombin Gelfoam was added to assist with hemostasis and the incisions were again irrigated with saline.  Hemostasis was excellent.  The incisions were closed in the deep layers with interrupted 2-0 Vicryl sutures.  The deep dermis was  reapproximated with 3-0 Vicryl sutures.  The skin was closed with interrupted vertical mattress 3-0 nylon sutures and staples.  The incisions were dressed with 4 x 4 gauze and Medipore tape.  The patient was extubated and tolerated the procedure well was transported to the PACU in stable condition.     Estimated Blood loss: 400ml     Complications: none     MOI Childers II, MD, VI  Vascular Surgery  [facility]

## 2024-11-01 NOTE — DISCHARGE INSTRUCTIONS
***Empieza a greg tu xarelto mañana***    VASCULAR SURGERY DISCHARGE INSTRUCTIONS    Woundcare:  - Shower daily and pad your incision site dry  - Leave sutures in place  - It is normal to notice some bruising at your incision site in the first 1-3 days after surgery    Activity:  - Activity is good for you. Try to walk frequently and increase walking distance every day  - No heavy lifting for 2 weeks  - No baths, swimming in pools, lakes, [employer] etc. for 2 weeks     Diet:  -Resume your pre-operative home diet    Follow up:  -Follow up with vascular surgery clinic appointment in ~2 weeks    Call Vascular Surgery Office at [phone] if you experience:  -Increased redness, warmth, tenderness, or draining pus from your incision  -Increased pain/swelling at your incision  -Worsening fevers, chills, nausea/vomiting  -Pain, weakness, coldness, or numbness in your legs  -Uncontrolled pain  -Your call will be returned within 24 hours and further instructions will be provided    Go to ER/Urgent Care if you experience:  -Worsening shortness of breath or chest pain  -Sudden severe pain and swelling at your incision site

## 2024-11-01 NOTE — PLAN OF CARE
[facility] - Surgical Intensive Care  Initial Discharge Assessment       Primary Care Provider: Cory Che MD    Admission Diagnosis: Femoral artery aneurysm, bilateral [I72.4]  PAD (peripheral artery disease) [I73.9]    Admission Date: 10/31/2024  Expected Discharge Date: 11/1/2024    Transition of Care Barriers: (P) None    Payor: MEDICARE / Plan: MEDICARE PART A & B / Product Type: Government /     Extended Emergency Contact Information  Primary Emergency Contact: Vishnu Gilliam  Mobile Phone: [phone]  Relation: Son  Secondary Emergency Contact: Ryann Rowe   [place]  Mobile Phone: [phone]  Relation: Spouse    Discharge Plan A: (P) Home with family  Discharge Plan B: (P) Home      [facility] [address]  [place]  [address] [facility]  [address] [facility]  [place] [address]  Phone: [phone] Fax: [phone]    [facility]  [address]  [place] [address]  Phone: [phone] Fax: [phone]    [facility] [place], [place] - [address]  [address] [address]  Phone: [phone] Fax: [phone]      Initial Assessment (most recent)       Adult Discharge Assessment - 11/01/24 1634          Discharge Assessment    Assessment Type Discharge Planning Assessment   Completed utilizing live  services    Confirmed/corrected address, phone number and insurance Yes     Confirmed Demographics Correct on Facesheet     Source of Information patient;family     Communicated TIARRA with patient/caregiver Yes (P)      People in Home spouse (P)      Do you expect to return to your current living situation? Yes (P)      Do you have help at home or someone to help you manage your care at home? Yes (P)      Who are your caregiver(s) and their phone number(s)? Ryann Rowe  [phone] (P)      Prior to hospitilization cognitive status: Alert/Oriented (P)      Current cognitive status:  Alert/Oriented (P)      Walking or Climbing Stairs Difficulty no (P)      Dressing/Bathing Difficulty no (P)      Home Accessibility wheelchair accessible (P)      Home Layout Able to live on 1st floor (P)      Equipment Currently Used at Home none (P)      Readmission within 30 days? No (P)      Patient currently being followed by outpatient case management? No (P)      Do you currently have service(s) that help you manage your care at home? No (P)      Do you take prescription medications? Yes (P)      Do you have prescription coverage? Yes (P)      Coverage MEDICARE - MEDICARE PART A & B - (P)      Do you have any problems affording any of your prescribed medications? No (P)      Is the patient taking medications as prescribed? yes (P)      Who is going to help you get home at discharge? Ryann Rowe  (spouse) [phone] (P)      How do you get to doctors appointments? car, drives self;family or friend will provide (P)      Are you on dialysis? No (P)      Do you take coumadin? No (P)      Discharge Plan A Home with family (P)      Discharge Plan B Home (P)      DME Needed Upon Discharge  none (P)      Discharge Plan discussed with: Patient;Spouse/sig other (P)      Transition of Care Barriers None (P)         Physical Activity    On average, how many days per week do you engage in moderate to strenuous exercise (like a brisk walk)? 0 days (P)      On average, how many minutes do you engage in exercise at this level? 0 min (P)         Financial Resource Strain    How hard is it for you to pay for the very basics like food, housing, medical care, and heating? Not hard at all (P)         Housing Stability    In the last 12 months, was there a time when you were not able to pay the mortgage or rent on time? No (P)      At any time in the past 12 months, were you homeless or living in a shelter (including now)? No (P)         Transportation Needs    Has the lack of transportation kept you from medical  appointments, meetings, work or from getting things needed for daily living? No (P)         Food Insecurity    Within the past 12 months, you worried that your food would run out before you got the money to buy more. Never true (P)      Within the past 12 months, the food you bought just didn't last and you didn't have money to get more. Never true (P)         Stress    Do you feel stress - tense, restless, nervous, or anxious, or unable to sleep at night because your mind is troubled all the time - these days? Not at all (P)         Social Isolation    How often do you feel lonely or isolated from those around you?  Never (P)         Alcohol Use    Q1: How often do you have a drink containing alcohol? Never (P)      Q2: How many drinks containing alcohol do you have on a typical day when you are drinking? Patient does not drink (P)      Q3: How often do you have six or more drinks on one occasion? Never (P)         Utilities    In the past 12 months has the electric, gas, oil, or water company threatened to shut off services in your home? No (P)         Health Literacy    How often do you need to have someone help you when you read instructions, pamphlets, or other written material from your doctor or pharmacy? Never (P)         OTHER    Name(s) of People in Home Ryann Rowe (spouse) [phone] (P)                  Discharge Plan A and Plan B have been determined by review of patient's clinical status, future medical and therapeutic needs, and coverage/benefits for post-acute care in coordination with multidisciplinary team members.                 MARIXA Smith, LMSW  [facility]  Case Management  Ext. [phone]

## 2024-11-01 NOTE — HOSPITAL COURSE
For details of hospital stay, please refer to daily progress notes. Briefly, this is a 69 y.o. male presented on 10/31 for planned surgical intervention for right femoral pseudoaneurysm. Patient was taken to OR and underwent L SFA to below knee pop bypass and ligation of L popliteal aneurysm . Post-operatively patient was admitted to the floor and had an uncomplicated hospital recovery.     On the day of discharge, the patient was ambulating without difficulty, voiding spontaneously, was tolerating a diet without nausea or vomiting, and pain was well controlled on PO pain medications. Discharge instructions were explained to the patient and appropriate follow-up was arranged.

## 2024-11-01 NOTE — NURSING
PCU Plan of Care    Patient Dx: Femoral artery aneurysm, left    Vital Signs (last 12 hours):   Temp:  [97.9 °F (36.6 °C)-98.3 °F (36.8 °C)]   Pulse:  [57-73]   Resp:  [16-22]   BP: (103-129)/(58-75)   SpO2:  [91 %-95 %]   Arterial Line BP: (111-117)/(51-59)      Neuro: AAOx4, Follows Commands, and Moves all extremities spontaneously     Cardiac: normal sinus rhythm    Respiratory: Room Air    Frequent Checks: Neurovascular Checks q2h    Urine Output: Voids Spontaneously         Diet: Regular    Mobility: Up to Chair  and Ambulated in Room ; Assistance Required: Independent      Skin:    Patient turned q2h, bony prominences protected, and mattress inflated/working correctly.   Waldemar Score: 20.    Shift Events:   See flowsheet for further assessment/details.  Family updated on current condition/plan of care, questions answered, and emotional support provided.  MD updated on current condition, vitals, labs, and gtts.

## 2024-11-01 NOTE — NURSING
Pt discharged home with family. Vital signs stable. No complaints of pain or discomfort. Pt rolled down in wheelchair by PCT.

## 2024-11-14 ENCOUNTER — OFFICE VISIT (OUTPATIENT)
Dept: VASCULAR SURGERY | Facility: CLINIC | Age: 69
End: 2024-11-14
Attending: SURGERY
Payer: MEDICARE

## 2024-11-14 ENCOUNTER — HOSPITAL ENCOUNTER (OUTPATIENT)
Dept: VASCULAR SURGERY | Facility: CLINIC | Age: 69
Discharge: HOME | End: 2024-11-14
Attending: SURGERY
Payer: MEDICARE

## 2024-11-14 VITALS
DIASTOLIC BLOOD PRESSURE: 91 MMHG | WEIGHT: 191.81 LBS | TEMPERATURE: 98 F | HEART RATE: 70 BPM | BODY MASS INDEX: 28.41 KG/M2 | SYSTOLIC BLOOD PRESSURE: 121 MMHG | HEIGHT: 69 IN

## 2024-11-14 DIAGNOSIS — I73.9 PAD (PERIPHERAL ARTERY DISEASE): Primary | ICD-10-CM

## 2024-11-14 DIAGNOSIS — I72.4 BILATERAL POPLITEAL ARTERY ANEURYSM: Primary | ICD-10-CM

## 2024-11-14 DIAGNOSIS — I73.9 PVD (PERIPHERAL VASCULAR DISEASE): ICD-10-CM

## 2024-11-14 PROCEDURE — 99024 POSTOP FOLLOW-UP VISIT: CPT | Mod: POP,,, | Performed by: SURGERY

## 2024-11-14 PROCEDURE — 99213 OFFICE O/P EST LOW 20 MIN: CPT | Mod: PBBFAC,25 | Performed by: SURGERY

## 2024-11-14 PROCEDURE — 99999 PR PBB SHADOW E&M-EST. PATIENT-LVL III: CPT | Mod: PBBFAC,,, | Performed by: SURGERY

## 2024-11-14 PROCEDURE — 93923 UPR/LXTR ART STDY 3+ LVLS: CPT | Mod: 26,S$PBB,, | Performed by: SURGERY

## 2024-11-14 PROCEDURE — 93923 UPR/LXTR ART STDY 3+ LVLS: CPT | Mod: PBBFAC | Performed by: SURGERY

## 2024-11-14 RX ORDER — CYCLOBENZAPRINE HCL 10 MG
10 TABLET ORAL 2 TIMES DAILY PRN
Qty: 40 TABLET | Refills: 0 | Status: SHIPPED | OUTPATIENT
Start: 2024-11-14 | End: 2024-12-04

## 2024-11-14 RX ORDER — CYCLOBENZAPRINE HCL 10 MG
10 TABLET ORAL 3 TIMES DAILY PRN
Qty: 120 TABLET | Refills: 3 | Status: SHIPPED | OUTPATIENT
Start: 2024-11-14 | End: 2024-11-14

## 2024-11-14 NOTE — PROGRESS NOTES
VASCULAR SURGERY CLINIC NOTE    Patient returns for postop visit after left SFA to below-knee popliteal artery bypass with ligation of the distal SFA and below-knee popliteal artery for treatment of left popliteal aneurysm.  He complains of muscle spasms and swelling of the leg.  He denies any issues at his incisions.  He says the spasms like it difficult for him to walk.    PHYSICAL EXAM:  2+ palpable pulse over his bypass  1+ LLE edema  Pain with ankle dorsiflexion and extension of the knee  Incisions CDI, no erythema, no drainage, no swelling, sutures in place at below knee incision    A/P:  69-year-old male status post left SFA to popliteal artery bypass with PTFE for large left popliteal artery aneurysm doing well but with significant muscle spasms causing pain and tightness. Bypass is patent and incisions are healing well. He had similar complaints after his last bypass as well.    -cyclobenzaprine 10mg BID PRN for musce spasms  -continue home eliquis for afib  -continue ASA 81mg daily for bypass patency  -RTC 1 month for reassessment to discuss treatment of left femoral artery aneurysm    MOI Childers II, MD, [facility]  Vascular Surgery  [facility]

## 2024-12-12 ENCOUNTER — OFFICE VISIT (OUTPATIENT)
Dept: VASCULAR SURGERY | Facility: CLINIC | Age: 69
End: 2024-12-12
Attending: SURGERY
Payer: MEDICARE

## 2024-12-12 VITALS
WEIGHT: 194 LBS | DIASTOLIC BLOOD PRESSURE: 79 MMHG | BODY MASS INDEX: 28.73 KG/M2 | TEMPERATURE: 98 F | HEIGHT: 69 IN | SYSTOLIC BLOOD PRESSURE: 126 MMHG | HEART RATE: 73 BPM

## 2024-12-12 DIAGNOSIS — I72.4: Primary | ICD-10-CM

## 2024-12-12 PROCEDURE — 99999 PR PBB SHADOW E&M-EST. PATIENT-LVL III: CPT | Mod: PBBFAC,,, | Performed by: SURGERY

## 2024-12-12 PROCEDURE — 99213 OFFICE O/P EST LOW 20 MIN: CPT | Mod: PBBFAC | Performed by: SURGERY

## 2024-12-12 PROCEDURE — 99024 POSTOP FOLLOW-UP VISIT: CPT | Mod: POP,,, | Performed by: SURGERY

## 2024-12-12 RX ORDER — METHOCARBAMOL 750 MG/1
750 TABLET, FILM COATED ORAL EVERY 6 HOURS PRN
COMMUNITY
Start: 2024-11-15

## 2024-12-12 NOTE — PROGRESS NOTES
VASCULAR SURGERY NOTE    Patient ID: Tan Gilliam is a 69 y.o. male.    I. HISTORY     Chief Complaint: Femoral artery aneurysms     HPI: Tan Gilliam is a 69 y.o. 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 He discovered the aneurysms from CT imaging in the ER 3/16/24 for abdominal pain which was diagnosed diverticulitis.  Patient returns for clinic visit s/p right SFA to below-knee popliteal artery bypass with PTFE in May 2024 and left SFA to below knee popliteal artery bypass with PTFE on 10/31/24. Patient says that he is feeling better since his last clinic appointment.  He has no pain at his lower extremity anymore.  He has been walking regularly and does not have major limitations.  He does complain of some hypersensitivity over his left medial leg.  He avoids wearing pants and tries to wear mostly shorts because he says that it is sensitive to touch.  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  He does also complain of some persistent swelling in the left lower extremity.    ALLERGIES: Flu vaccine     SOCIAL HISTORY: previous smoker (quit 1991, several packs/day for 25 years)    FAMILY HISTORY:  Brother recently diagnosed with AAA    MEDICATIONS: Xarelto (prescribed for aneurysm), aspirin, coreg, amlodipine, lisiniopril, Humira pen         Past Medical History:   Diagnosis Date    Asthma     CVA (cerebral vascular accident)     Hypertension         Past Surgical History:   Procedure Laterality Date    ANGIOGRAPHY Right 5/21/2024    Procedure: ANGIOGRAM - right lower extremity;  Surgeon: MOI Childers II, MD;  Location: [address];  Service: Vascular;  Laterality: Right;    COLONOSCOPY N/A 4/25/2019    Procedure: COLONOSCOPY;  Surgeon: Luis Bogran-Reyes, MD;  Location: [facility];  Service: Endoscopy;  Laterality:  N/A;    CREATION OF FEMOROPOPLITEAL ARTERIAL BYPASS USING GRAFT Right 2024    Procedure: CREATION, BYPASS, ARTERIAL, FEMORAL TO POPLITEAL, USING GRAFT;  Surgeon: MOI Childers II, MD;  Location: [facility] OR [address] [place] [address];  Service: Vascular;  Laterality: Right;    CREATION OF FEMOROPOPLITEAL ARTERIAL BYPASS USING GRAFT Left 10/31/2024    Procedure: CREATION, BYPASS, ARTERIAL, FEMORAL TO POPLITEAL, USING GRAFT;  Surgeon: MOI Childers II, MD;  Location: [facility] OR [address] [place] [address];  Service: Vascular;  Laterality: Left;    HEMORRHOID SURGERY      NOSE SURGERY      TONSILLECTOMY         Social History     Occupational History    Not on file   Tobacco Use    Smoking status: Former     Current packs/day: 0.00     Types: Cigarettes, Cigars     Quit date:      Years since quittin.9    Smokeless tobacco: Never   Substance and Sexual Activity    Alcohol use: Yes     Alcohol/week: 6.0 standard drinks of alcohol     Types: 6 Cans of beer per week    Drug use: Never    Sexual activity: Not Currently         Review of Systems   Constitutional: Negative for weight loss.   HENT:  Negative for ear pain and nosebleeds.    Eyes:  Negative for discharge and pain.   Cardiovascular:  Negative for chest pain and palpitations.   Respiratory:  Negative for cough, shortness of breath and wheezing.    Endocrine: Negative for cold intolerance, heat intolerance and polyphagia.   Hematologic/Lymphatic: Negative for adenopathy. Does not bruise/bleed easily.   Skin:  Negative for itching and rash.   Musculoskeletal:  Negative for joint swelling and muscle cramps.   Gastrointestinal:  Negative for abdominal pain, diarrhea, nausea and vomiting.   Genitourinary:  Negative for dysuria and flank pain.   Neurological:  Negative for numbness and seizures.         II. PHYSICAL EXAM     Physical Exam  Constitutional:       Appearance: Normal appearance. He is not ill-appearing or diaphoretic.   HENT:      Head: Normocephalic and atraumatic.   Eyes:       General: No scleral icterus.        Right eye: No discharge.         Left eye: No discharge.      Extraocular Movements: Extraocular movements intact.      Conjunctiva/sclera: Conjunctivae normal.   Cardiovascular:      Rate and Rhythm: Normal rate and regular rhythm.   Pulmonary:      Effort: Pulmonary effort is normal. No respiratory distress.   Musculoskeletal:         General: Normal range of motion.      Cervical back: Normal range of motion and neck supple.   Skin:     General: Skin is warm and dry.      Comments: Well healed scars on bilateral medial leg/thighs   Neurological:      General: No focal deficit present.      Mental Status: He is alert and oriented to person, place, and time.   Psychiatric:         Mood and Affect: Mood normal.         Behavior: Behavior normal.       VASC:  RLE: 4+ palpable femoral pulse, aneurysm palpable, absent popliteal pulse; 1+ DP, Absent PT  LLE: 4+ palpable femoral pulse, aneurysm palpable, absent popliteal pulse, 2+ DP, Absent PT    III. ASSESSMENT & PLAN (MEDICAL DECISION MAKING)       Imaging Results: (I have personally reviewed all images and provided interpretation below)  No new imaging-- prior CTA from April 2024 reviewed for planning of femoral aneurysm repair      Assessment/Diagnosis and Plan:    1. Bilateral femoral artery aneurysm        69 y.o. 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  He has had successful repair of his bilateral popliteal artery aneurysms.  He returns to discuss surgical repair of his femoral artery aneurysms.  He still feels like he is recovering from his last femoral to popliteal artery bypass.  He has some hypersensitivity over his left medial knee which has yet to resolve.  He would like some more time to recover before going forward with left femoral artery aneurysm repair.  We will have him return in 8  weeks for re-examination to discuss possible left femoral artery aneurysm repair.     -continue asa 81mg daily  -continue statin  -continue home nereida Childers II, MD, VI  Vascular Surgery  [facility]

## 2025-02-06 ENCOUNTER — OFFICE VISIT (OUTPATIENT)
Dept: VASCULAR SURGERY | Facility: CLINIC | Age: 70
End: 2025-02-06
Attending: SURGERY
Payer: MEDICARE

## 2025-02-06 ENCOUNTER — LAB VISIT (OUTPATIENT)
Dept: LAB | Facility: HOSPITAL | Age: 70
End: 2025-02-06
Attending: SURGERY
Payer: MEDICARE

## 2025-02-06 VITALS
DIASTOLIC BLOOD PRESSURE: 82 MMHG | SYSTOLIC BLOOD PRESSURE: 134 MMHG | BODY MASS INDEX: 29.39 KG/M2 | HEART RATE: 78 BPM | HEIGHT: 69 IN | TEMPERATURE: 98 F | WEIGHT: 198.44 LBS

## 2025-02-06 DIAGNOSIS — I72.4: Primary | ICD-10-CM

## 2025-02-06 DIAGNOSIS — I72.4 FEMORAL ARTERY ANEURYSM: Primary | ICD-10-CM

## 2025-02-06 DIAGNOSIS — Z01.818 PRE-OP EVALUATION: Primary | ICD-10-CM

## 2025-02-06 DIAGNOSIS — Z01.818 PRE-OP EVALUATION: ICD-10-CM

## 2025-02-06 LAB
ANION GAP SERPL CALC-SCNC: 8 MMOL/L (ref 8–16)
BASOPHILS # BLD AUTO: 0.08 K/UL (ref 0–0.2)
BASOPHILS NFR BLD: 1 % (ref 0–1.9)
BUN SERPL-MCNC: 16 MG/DL (ref 8–23)
CALCIUM SERPL-MCNC: 9 MG/DL (ref 8.7–10.5)
CHLORIDE SERPL-SCNC: 106 MMOL/L (ref 95–110)
CO2 SERPL-SCNC: 26 MMOL/L (ref 23–29)
CREAT SERPL-MCNC: 1.1 MG/DL (ref 0.5–1.4)
DIFFERENTIAL METHOD BLD: ABNORMAL
EOSINOPHIL # BLD AUTO: 0.2 K/UL (ref 0–0.5)
EOSINOPHIL NFR BLD: 3.1 % (ref 0–8)
ERYTHROCYTE [DISTWIDTH] IN BLOOD BY AUTOMATED COUNT: 13.7 % (ref 11.5–14.5)
EST. GFR  (NO RACE VARIABLE): >60 ML/MIN/1.73 M^2
GLUCOSE SERPL-MCNC: 81 MG/DL (ref 70–110)
HCT VFR BLD AUTO: 35.1 % (ref 40–54)
HGB BLD-MCNC: 10.7 G/DL (ref 14–18)
IMM GRANULOCYTES # BLD AUTO: 0.03 K/UL (ref 0–0.04)
IMM GRANULOCYTES NFR BLD AUTO: 0.4 % (ref 0–0.5)
LYMPHOCYTES # BLD AUTO: 2.8 K/UL (ref 1–4.8)
LYMPHOCYTES NFR BLD: 35.6 % (ref 18–48)
MCH RBC QN AUTO: 24.8 PG (ref 27–31)
MCHC RBC AUTO-ENTMCNC: 30.5 G/DL (ref 32–36)
MCV RBC AUTO: 81 FL (ref 82–98)
MONOCYTES # BLD AUTO: 1 K/UL (ref 0.3–1)
MONOCYTES NFR BLD: 13.4 % (ref 4–15)
NEUTROPHILS # BLD AUTO: 3.6 K/UL (ref 1.8–7.7)
NEUTROPHILS NFR BLD: 46.5 % (ref 38–73)
NRBC BLD-RTO: 0 /100 WBC
PLATELET # BLD AUTO: 298 K/UL (ref 150–450)
PMV BLD AUTO: 9.4 FL (ref 9.2–12.9)
POTASSIUM SERPL-SCNC: 4.5 MMOL/L (ref 3.5–5.1)
RBC # BLD AUTO: 4.31 M/UL (ref 4.6–6.2)
SODIUM SERPL-SCNC: 140 MMOL/L (ref 136–145)
WBC # BLD AUTO: 7.77 K/UL (ref 3.9–12.7)

## 2025-02-06 PROCEDURE — 99999 PR PBB SHADOW E&M-EST. PATIENT-LVL IV: CPT | Mod: PBBFAC,,, | Performed by: SURGERY

## 2025-02-06 PROCEDURE — 99214 OFFICE O/P EST MOD 30 MIN: CPT | Mod: PBBFAC | Performed by: SURGERY

## 2025-02-06 PROCEDURE — 85025 COMPLETE CBC W/AUTO DIFF WBC: CPT | Performed by: SURGERY

## 2025-02-06 PROCEDURE — 99214 OFFICE O/P EST MOD 30 MIN: CPT | Mod: S$PBB,,, | Performed by: SURGERY

## 2025-02-06 PROCEDURE — 36415 COLL VENOUS BLD VENIPUNCTURE: CPT | Performed by: SURGERY

## 2025-02-06 PROCEDURE — 80048 BASIC METABOLIC PNL TOTAL CA: CPT | Performed by: SURGERY

## 2025-02-06 NOTE — PROGRESS NOTES
VASCULAR SURGERY NOTE    Patient ID: Tan Gilliam is a 69 y.o. male.    I. HISTORY     Chief Complaint: Femoral artery aneurysms     HPI: Tan Gilliam is a 69 y.o. 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 He discovered the aneurysms from CT imaging in the ER 3/16/24 for abdominal pain which was diagnosed diverticulitis.  Patient returns for clinic visit s/p right SFA to below-knee popliteal artery bypass with PTFE in May 2024 and left SFA to below knee popliteal artery bypass with PTFE on 10/31/24. Patient says that he is feeling better since his last clinic appointment.  He has no pain at his lower extremity anymore.  He has been walking regularly and does not have major limitations.  He does complain of some hypersensitivity over his left medial leg.  He avoids wearing pants and tries to wear mostly shorts because he says that it is sensitive to touch.  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  He does also complain of some persistent swelling in the left lower extremity.    2/6/25: Pt presents with his son who translates during the encounter. Pt states that his previously described BLE neuropathic pain has subsided, relief of BLE hypersensitivity. Pt endorses new mild tenderness noted within the last 2 weeks to the posterior left leg and thigh only with flexion of LLE, no pain on palpation. BLE 1+ non pitting edema presents similarly to previous. Pt feels he has recovered well from previous bypass 10/31/24. Pt feels he would be ready and willing to undergo the previously discussed b/l femoral bypass 2 weeks from now. Pt denies any other new complaints BLE.    ALLERGIES: Flu vaccine     SOCIAL HISTORY: previous smoker (quit 1991, several packs/day for 25 years)    FAMILY HISTORY:  Brother recently diagnosed with  AAA    MEDICATIONS: Xarelto (prescribed for aneurysm), aspirin, coreg, amlodipine, lisiniopril, Humira pen         Past Medical History:   Diagnosis Date    Asthma     CVA (cerebral vascular accident)     Hypertension         Past Surgical History:   Procedure Laterality Date    ANGIOGRAPHY Right 2024    Procedure: ANGIOGRAM - right lower extremity;  Surgeon: MOI Childers II, MD;  Location: [facility] OR [address] [place] [address];  Service: Vascular;  Laterality: Right;    COLONOSCOPY N/A 2019    Procedure: COLONOSCOPY;  Surgeon: Luis Bogran-Reyes, MD;  Location: [facility];  Service: Endoscopy;  Laterality: N/A;    CREATION OF FEMOROPOPLITEAL ARTERIAL BYPASS USING GRAFT Right 2024    Procedure: CREATION, BYPASS, ARTERIAL, FEMORAL TO POPLITEAL, USING GRAFT;  Surgeon: MOI Childers II, MD;  Location: [facility] OR [address] [place] [address];  Service: Vascular;  Laterality: Right;    CREATION OF FEMOROPOPLITEAL ARTERIAL BYPASS USING GRAFT Left 10/31/2024    Procedure: CREATION, BYPASS, ARTERIAL, FEMORAL TO POPLITEAL, USING GRAFT;  Surgeon: MOI Childers II, MD;  Location: [facility] OR [address] [place] [address];  Service: Vascular;  Laterality: Left;    HEMORRHOID SURGERY      NOSE SURGERY      TONSILLECTOMY         Social History     Occupational History    Not on file   Tobacco Use    Smoking status: Former     Current packs/day: 0.00     Types: Cigarettes, Cigars     Quit date:      Years since quittin.1    Smokeless tobacco: Never   Substance and Sexual Activity    Alcohol use: Yes     Alcohol/week: 6.0 standard drinks of alcohol     Types: 6 Cans of beer per week    Drug use: Never    Sexual activity: Not Currently         Review of Systems   Constitutional: Negative for weight loss.   HENT:  Negative for ear pain and nosebleeds.    Eyes:  Negative for discharge and pain.   Cardiovascular:  Negative for chest pain and palpitations.   Respiratory:  Negative for cough, shortness of breath and wheezing.    Endocrine: Negative for cold intolerance,  heat intolerance and polyphagia.   Hematologic/Lymphatic: Negative for adenopathy. Does not bruise/bleed easily.   Skin:  Negative for itching and rash.   Musculoskeletal:  Negative for joint swelling and muscle cramps.   Gastrointestinal:  Negative for abdominal pain, diarrhea, nausea and vomiting.   Genitourinary:  Negative for dysuria and flank pain.   Neurological:  Negative for numbness and seizures.         II. PHYSICAL EXAM     Physical Exam  Constitutional:       Appearance: Normal appearance. He is not ill-appearing or diaphoretic.   HENT:      Head: Normocephalic and atraumatic.   Eyes:      General: No scleral icterus.        Right eye: No discharge.         Left eye: No discharge.      Extraocular Movements: Extraocular movements intact.      Conjunctiva/sclera: Conjunctivae normal.   Cardiovascular:      Rate and Rhythm: Normal rate and regular rhythm.   Pulmonary:      Effort: Pulmonary effort is normal. No respiratory distress.   Musculoskeletal:         General: Normal range of motion.      Cervical back: Normal range of motion and neck supple.   Skin:     General: Skin is warm and dry.      Comments: Well healed scars on bilateral medial leg/thighs   Neurological:      General: No focal deficit present.      Mental Status: He is alert and oriented to person, place, and time.   Psychiatric:         Mood and Affect: Mood normal.         Behavior: Behavior normal.       VASC:  RLE: 4+ palpable femoral pulse, aneurysm palpable, absent popliteal pulse; 2+ DP   LLE: 4+ palpable femoral pulse, aneurysm palpable, absent popliteal pulse, 2+ DP     III. ASSESSMENT & PLAN (MEDICAL DECISION MAKING)       Imaging Results: (I have personally reviewed all images and provided interpretation below)  No new imaging-- prior CTA from April 2024 reviewed for planning of femoral aneurysm repair      Assessment/Diagnosis and Plan:    1. Bilateral femoral artery aneurysm          69 y.o. 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  He has had successful repair of his bilateral popliteal artery aneurysms.  He returns to discuss surgical repair of his femoral artery aneurysms.  He feels he has recovered adequately from previous surgery, with resolution of previous hypersensitivity, and is now ready and willing to undergo bilateral femoral bypass.  I had an extensive discussion regarding the risks, benefits, alternatives to surgery.  His left femoral artery aneurysm is larger than his right, so we will fix this first.     -continue asa 81mg daily  -Hold home Xarelto 48 hours pre-op  -Plan LEFT femoral artery aneurysm repair through groin incision and possible retroperitoneal incision for proximal control/anastomosis    MOI Childers II, MD, [facility]  Vascular Surgery  [facility]

## 2025-02-06 NOTE — H&P (VIEW-ONLY)
VASCULAR SURGERY NOTE    Patient ID: Tan Gilliam is a 69 y.o. male.    I. HISTORY     Chief Complaint: Femoral artery aneurysms     HPI: Tan Gilliam is a 69 y.o. 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 He discovered the aneurysms from CT imaging in the ER 3/16/24 for abdominal pain which was diagnosed diverticulitis.  Patient returns for clinic visit s/p right SFA to below-knee popliteal artery bypass with PTFE in May 2024 and left SFA to below knee popliteal artery bypass with PTFE on 10/31/24. Patient says that he is feeling better since his last clinic appointment.  He has no pain at his lower extremity anymore.  He has been walking regularly and does not have major limitations.  He does complain of some hypersensitivity over his left medial leg.  He avoids wearing pants and tries to wear mostly shorts because he says that it is sensitive to touch.  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  He does also complain of some persistent swelling in the left lower extremity.    2/6/25: Pt presents with his son who translates during the encounter. Pt states that his previously described BLE neuropathic pain has subsided, relief of BLE hypersensitivity. Pt endorses new mild tenderness noted within the last 2 weeks to the posterior left leg and thigh only with flexion of LLE, no pain on palpation. BLE 1+ non pitting edema presents similarly to previous. Pt feels he has recovered well from previous bypass 10/31/24. Pt feels he would be ready and willing to undergo the previously discussed b/l femoral bypass 2 weeks from now. Pt denies any other new complaints BLE.    ALLERGIES: Flu vaccine     SOCIAL HISTORY: previous smoker (quit 1991, several packs/day for 25 years)    FAMILY HISTORY:  Brother recently diagnosed with  AAA    MEDICATIONS: Xarelto (prescribed for aneurysm), aspirin, coreg, amlodipine, lisiniopril, Humira pen         Past Medical History:   Diagnosis Date    Asthma     CVA (cerebral vascular accident)     Hypertension         Past Surgical History:   Procedure Laterality Date    ANGIOGRAPHY Right 2024    Procedure: ANGIOGRAM - right lower extremity;  Surgeon: MOI Childers II, MD;  Location: [facility] OR [address] [place] [address];  Service: Vascular;  Laterality: Right;    COLONOSCOPY N/A 2019    Procedure: COLONOSCOPY;  Surgeon: Luis Bogran-Reyes, MD;  Location: [facility];  Service: Endoscopy;  Laterality: N/A;    CREATION OF FEMOROPOPLITEAL ARTERIAL BYPASS USING GRAFT Right 2024    Procedure: CREATION, BYPASS, ARTERIAL, FEMORAL TO POPLITEAL, USING GRAFT;  Surgeon: MOI Childers II, MD;  Location: [facility] OR [address] [place] [address];  Service: Vascular;  Laterality: Right;    CREATION OF FEMOROPOPLITEAL ARTERIAL BYPASS USING GRAFT Left 10/31/2024    Procedure: CREATION, BYPASS, ARTERIAL, FEMORAL TO POPLITEAL, USING GRAFT;  Surgeon: MOI Childers II, MD;  Location: [facility] OR [address] [place] [address];  Service: Vascular;  Laterality: Left;    HEMORRHOID SURGERY      NOSE SURGERY      TONSILLECTOMY         Social History     Occupational History    Not on file   Tobacco Use    Smoking status: Former     Current packs/day: 0.00     Types: Cigarettes, Cigars     Quit date:      Years since quittin.1    Smokeless tobacco: Never   Substance and Sexual Activity    Alcohol use: Yes     Alcohol/week: 6.0 standard drinks of alcohol     Types: 6 Cans of beer per week    Drug use: Never    Sexual activity: Not Currently         Review of Systems   Constitutional: Negative for weight loss.   HENT:  Negative for ear pain and nosebleeds.    Eyes:  Negative for discharge and pain.   Cardiovascular:  Negative for chest pain and palpitations.   Respiratory:  Negative for cough, shortness of breath and wheezing.    Endocrine: Negative for cold intolerance,  heat intolerance and polyphagia.   Hematologic/Lymphatic: Negative for adenopathy. Does not bruise/bleed easily.   Skin:  Negative for itching and rash.   Musculoskeletal:  Negative for joint swelling and muscle cramps.   Gastrointestinal:  Negative for abdominal pain, diarrhea, nausea and vomiting.   Genitourinary:  Negative for dysuria and flank pain.   Neurological:  Negative for numbness and seizures.         II. PHYSICAL EXAM     Physical Exam  Constitutional:       Appearance: Normal appearance. He is not ill-appearing or diaphoretic.   HENT:      Head: Normocephalic and atraumatic.   Eyes:      General: No scleral icterus.        Right eye: No discharge.         Left eye: No discharge.      Extraocular Movements: Extraocular movements intact.      Conjunctiva/sclera: Conjunctivae normal.   Cardiovascular:      Rate and Rhythm: Normal rate and regular rhythm.   Pulmonary:      Effort: Pulmonary effort is normal. No respiratory distress.   Musculoskeletal:         General: Normal range of motion.      Cervical back: Normal range of motion and neck supple.   Skin:     General: Skin is warm and dry.      Comments: Well healed scars on bilateral medial leg/thighs   Neurological:      General: No focal deficit present.      Mental Status: He is alert and oriented to person, place, and time.   Psychiatric:         Mood and Affect: Mood normal.         Behavior: Behavior normal.       VASC:  RLE: 4+ palpable femoral pulse, aneurysm palpable, absent popliteal pulse; 2+ DP   LLE: 4+ palpable femoral pulse, aneurysm palpable, absent popliteal pulse, 2+ DP     III. ASSESSMENT & PLAN (MEDICAL DECISION MAKING)       Imaging Results: (I have personally reviewed all images and provided interpretation below)  No new imaging-- prior CTA from April 2024 reviewed for planning of femoral aneurysm repair      Assessment/Diagnosis and Plan:    1. Bilateral femoral artery aneurysm          69 y.o. 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  He has had successful repair of his bilateral popliteal artery aneurysms.  He returns to discuss surgical repair of his femoral artery aneurysms.  He feels he has recovered adequately from previous surgery, with resolution of previous hypersensitivity, and is now ready and willing to undergo bilateral femoral bypass.  I had an extensive discussion regarding the risks, benefits, alternatives to surgery.  His left femoral artery aneurysm is larger than his right, so we will fix this first.     -continue asa 81mg daily  -Hold home Xarelto 48 hours pre-op  -Plan LEFT femoral artery aneurysm repair through groin incision and possible retroperitoneal incision for proximal control/anastomosis    MOI Childers II, MD, [facility]  Vascular Surgery  [facility]

## 2025-03-03 ENCOUNTER — ANESTHESIA EVENT (OUTPATIENT)
Dept: SURGERY | Facility: HOSPITAL | Age: 70
End: 2025-03-03
Payer: MEDICARE

## 2025-03-03 ENCOUNTER — TELEPHONE (OUTPATIENT)
Dept: VASCULAR SURGERY | Facility: CLINIC | Age: 70
End: 2025-03-03
Payer: MEDICARE

## 2025-03-03 NOTE — ANESTHESIA PREPROCEDURE EVALUATION
[facility]  Anesthesia Pre-Operative Evaluation         Patient Name: Tan Gilliam  YOB: 1955  MRN: [id number]    SUBJECTIVE:     Pre-operative evaluation for Procedure(s) (LRB):  REPAIR, ANEURYSM, ARTERY, FEMORAL (Left)     03/03/2025    Tan Gilliam is a 69 y.o. male w/ a significant PMHx of HTN, asthma, former tobacco use (quit 1991), remote CVA (residual left face sensory deficit), and psoriatic arthritis (on Humira) with hx of bilateral femoral and popliteal arterial aneurysms (R>L) s/p femoral to popliteal graft bypass on 10/31/24 (left) and 5/21/24(right).    Patient now presents for the above procedure(s).      LDA: None documented.       Prev airway:   Induction:  Intravenous    Intubated:  Postinduction    Mask Ventilation:  Easy with oral airway    Attempts:  1    Attempted By:  Resident anesthesiologist    Method of Intubation:  Direct    Blade:  Amelia 3    Laryngeal View Grade: Grade I - full view of cords      Difficult Airway Encountered?: No      Complications:  None    Airway Device:  Oral endotracheal tube    Airway Device Size:  7.5    Style/Cuff Inflation:  Cuffed    Tube secured:  23    Secured at:  The lips    Placement Verified By:  Capnometry    Complicating Factors:  None    Findings Post-Intubation:  Atraumatic/condition of teeth unchanged and BS equal bilateral    Drips: None documented.      Problem List[1]    Review of patient's allergies indicates:   Allergen Reactions    Influenza virus vaccines Other (See Comments)       Current Inpatient Medications:      Medications Ordered Prior to Encounter[2]    Past Surgical History:   Procedure Laterality Date    ANGIOGRAPHY Right 5/21/2024    Procedure: ANGIOGRAM - right lower extremity;  Surgeon: MOI Childers II, MD;  Location: [facility] OR [address] [place] [address];  Service: Vascular;  Laterality: Right;    COLONOSCOPY N/A 4/25/2019    Procedure: COLONOSCOPY;  Surgeon: Luis Bogran-Reyes, MD;  Location:  [facility];  Service: Endoscopy;  Laterality: N/A;    CREATION OF FEMOROPOPLITEAL ARTERIAL BYPASS USING GRAFT Right 5/21/2024    Procedure: CREATION, BYPASS, ARTERIAL, FEMORAL TO POPLITEAL, USING GRAFT;  Surgeon: MOI Childers II, MD;  Location: [facility] OR [address] [place] [address];  Service: Vascular;  Laterality: Right;    CREATION OF FEMOROPOPLITEAL ARTERIAL BYPASS USING GRAFT Left 10/31/2024    Procedure: CREATION, BYPASS, ARTERIAL, FEMORAL TO POPLITEAL, USING GRAFT;  Surgeon: MOI Childers II, MD;  Location: [facility] OR [address] [place] [address];  Service: Vascular;  Laterality: Left;    HEMORRHOID SURGERY      NOSE SURGERY      TONSILLECTOMY         Social History[3]    OBJECTIVE:     Vital Signs Range (Last 24H):         Significant Labs:  Lab Results   Component Value Date    WBC 7.77 02/06/2025    HGB 10.7 (L) 02/06/2025    HCT 35.1 (L) 02/06/2025     02/06/2025    CHOL 165 06/01/2020    TRIG 159 (H) 06/01/2020    HDL 30 (L) 06/01/2020    ALT 12 06/15/2024    AST 14 06/15/2024     02/06/2025    K 4.5 02/06/2025     02/06/2025    CREATININE 1.1 02/06/2025    BUN 16 02/06/2025    CO2 26 02/06/2025    TSH 0.83 09/24/2022    PSA 0.56 09/21/2017    INR 1.1 03/16/2024       Diagnostic Studies: No relevant studies.    EKG:   Results for orders placed or performed during the hospital encounter of 05/21/24   EKG 12-lead    Collection Time: 05/21/24 10:48 PM   Result Value Ref Range    QRS Duration 98 ms    [facility] QTC Calculation 450 ms    Narrative    Test Reason : I49.9,    Vent. Rate : 067 BPM     Atrial Rate : 067 BPM     P-R Int : 180 ms          QRS Dur : 098 ms      QT Int : 426 ms       P-R-T Axes : 070 -17 018 degrees     QTc Int : 450 ms    Sinus rhythm with frequent Premature ventricular complexes in a pattern of  bigeminy  Otherwise normal ECG  When compared with ECG of 29-APR-2024 12:37,  Nonspecific T wave abnormality now evident in Inferior leads  Confirmed by Salvatore OTERO MD ([id number]) on 5/22/2024 12:03:18 PM    Referred  By: MOI LEOS II           Confirmed By:Salvatore OTERO MD       2D ECHO:  TTE:  No results found for this or any previous visit.    ENRIQUE:  No results found for this or any previous visit.    ASSESSMENT/PLAN:                                                                                                        03/03/2025  Tan Gilliam is a 69 y.o., male.      Pre-op Assessment    I have reviewed the Patient Summary Reports.     I have reviewed the Nursing Notes. I have reviewed the NPO Status.   I have reviewed the Medications.     Review of Systems  Anesthesia Hx:              Personal Hx of Anesthesia complications, Post-Operative Nausea/Vomiting                    Cardiovascular:     Hypertension                                          Pulmonary:    Asthma                    Neurological:   CVA                                        Physical Exam  General: Alert and Oriented    Airway:  Mallampati: II / III  Mouth Opening: Normal  TM Distance: Normal  Tongue: Normal  Neck ROM: Normal ROM    Dental:  Edentulous        Anesthesia Plan  Type of Anesthesia, risks & benefits discussed:    Anesthesia Type: Gen ETT  Intra-op Monitoring Plan: Standard ASA Monitors  Post Op Pain Control Plan: multimodal analgesia  Induction:  IV  Airway Plan: Direct, Post-Induction  Informed Consent: Informed consent signed with the Patient and all parties understand the risks and agree with anesthesia plan.  All questions answered.   ASA Score: 3  Day of Surgery Review of History & Physical: H&P Update referred to the surgeon/provider.    Ready For Surgery From Anesthesia Perspective.     .           [1]   Patient Active Problem List  Diagnosis    Essential hypertension    Psoriasis    Arthralgia    Trigger finger, right middle finger    Paresthesia and pain of both upper extremities    Left sided sciatica    Mild intermittent asthma without complication    Symptomatic bradycardia    Bilateral popliteal artery aneurysm    Pre-op exam     PAD (peripheral artery disease)    Femoral artery aneurysm, left   [2]   No current facility-administered medications on file prior to encounter.     Current Outpatient Medications on File Prior to Encounter   Medication Sig Dispense Refill    acetaminophen (TYLENOL) 325 MG tablet Take 2 tablets (650 mg total) by mouth every 6 (six) hours as needed for Pain. 30 tablet 0    adalimumab-adbm (CYLTEZO,CF, PEN) 40 mg/0.8 mL PnKt Inject 1 pen  (40 mg total) into the skin every 14 (fourteen) days. 2 pen 11    albuterol (PROVENTIL/VENTOLIN HFA) 90 mcg/actuation inhaler Inhale 2 puffs into the lungs every 6 (six) hours as needed for Wheezing. Rescue      amLODIPine (NORVASC) 5 MG tablet Take 5 mg by mouth once daily.      ammonium lactate (LAC-HYDRIN) 12 % lotion Apply topically 2 (two) times daily. 1 each 10    aspirin (ECOTRIN) 81 MG EC tablet Take 81 mg by mouth once daily.      COREG 6.25 mg tablet Take by mouth every morning.      docusate sodium (COLACE) 100 MG capsule Take 1 capsule (100 mg total) by mouth 2 (two) times daily as needed for Constipation (please take while taking opioid pain medication). 30 capsule 0    dupilumab (DUPIXENT PEN) 300 mg/2 mL PnIj Inject 2 mLs (300 mg total) into the skin every 14 (fourteen) days. (Patient not taking: Reported on 2/28/2025) 4 mL 11    HYDROcodone-acetaminophen (NORCO) 5-325 mg per tablet Take 1 tablet by mouth every 4 (four) hours as needed for Pain. 28 tablet 0    lisinopriL (PRINIVIL,ZESTRIL) 40 MG tablet TAKE 1 TABLET BY MOUTH ONCE DAILY IN THE EVENING 90 tablet 0    methocarbamoL (ROBAXIN) 750 MG Tab Take 750 mg by mouth every 6 (six) hours as needed. (Patient not taking: Reported on 2/28/2025)      naproxen (NAPROSYN) 500 MG tablet TAKE 1 TABLET BY MOUTH EVERY 12 HOURS WITH FOOD OR MILK AS NEEDED for 30      oxyCODONE (ROXICODONE) 5 MG immediate release tablet Take 1 tablet (5 mg total) by mouth every 6 (six) hours as needed for Pain (severe pain unrelieved by  acetaminophen). (Patient not taking: Reported on 2025) 24 tablet 0    TRELEGY ELLIPTA 200-62.5-25 mcg inhaler INHALE 1 PUFF ONCE DAILY      VENTOLIN HFA 90 mcg/actuation inhaler INHALE 2 PUFFS BY MOUTH EVERY 6 HOURS AS NEEDED FOR WHEEZING 18 g 0    XARELTO 20 mg Tab Take 1 tablet (20 mg total) by mouth once daily. 60 tablet 0   [3]   Social History  Socioeconomic History    Marital status:    Tobacco Use    Smoking status: Former     Current packs/day: 0.00     Types: Cigarettes, Cigars     Quit date:      Years since quittin.1    Smokeless tobacco: Never   Substance and Sexual Activity    Alcohol use: Yes     Alcohol/week: 6.0 standard drinks of alcohol     Types: 6 Cans of beer per week    Drug use: Never    Sexual activity: Not Currently     Social Drivers of Health     Financial Resource Strain: Low Risk  (2024)    Overall Financial Resource Strain (CARDIA)     Difficulty of Paying Living Expenses: Not hard at all   Food Insecurity: No Food Insecurity (2024)    Hunger Vital Sign     Worried About Running Out of Food in the Last Year: Never true     Ran Out of Food in the Last Year: Never true   Transportation Needs: No Transportation Needs (2024)    TRANSPORTATION NEEDS     Transportation : No   Physical Activity: Inactive (2024)    Exercise Vital Sign     Days of Exercise per Week: 0 days     Minutes of Exercise per Session: 0 min   Stress: No Stress Concern Present (2024)    [employer] - Occupational Stress Questionnaire     Feeling of Stress : Not at all   Housing Stability: Unknown (2024)    Housing Stability Vital Sign     Unable to Pay for Housing in the Last Year: No     Homeless in the Last Year: No

## 2025-03-03 NOTE — TELEPHONE ENCOUNTER
Spoke with the pt's son and informed him of the time of arrival for surgery on Wednesday is 6am at the main campus on [facility],second floor,[facility].Pt's son also reminded not to eat or drink anything after midnight Tuesday and he verbalizes understanding of information received.

## 2025-03-05 ENCOUNTER — HOSPITAL ENCOUNTER (INPATIENT)
Facility: HOSPITAL | Age: 70
LOS: 2 days | Discharge: HOME | DRG: 271 | End: 2025-03-07
Attending: SURGERY | Admitting: SURGERY
Payer: MEDICARE

## 2025-03-05 ENCOUNTER — ANESTHESIA (OUTPATIENT)
Dept: SURGERY | Facility: HOSPITAL | Age: 70
End: 2025-03-05
Payer: MEDICARE

## 2025-03-05 DIAGNOSIS — I72.4: Primary | ICD-10-CM

## 2025-03-05 LAB
ABO + RH BLD: NORMAL
BLD GP AB SCN CELLS X3 SERPL QL: NORMAL
GLUCOSE SERPL-MCNC: 113 MG/DL (ref 70–110)
GLUCOSE SERPL-MCNC: 136 MG/DL (ref 70–110)
HCO3 UR-SCNC: 20.7 MMOL/L (ref 24–28)
HCO3 UR-SCNC: 21.9 MMOL/L (ref 24–28)
HCT VFR BLD CALC: 28 %PCV (ref 36–54)
HCT VFR BLD CALC: 29 %PCV (ref 36–54)
PCO2 BLDA: 38.8 MMHG (ref 35–45)
PCO2 BLDA: 39 MMHG (ref 35–45)
PH SMN: 7.33 [PH] (ref 7.35–7.45)
PH SMN: 7.36 [PH] (ref 7.35–7.45)
PO2 BLDA: 118 MMHG (ref 80–100)
PO2 BLDA: 131 MMHG (ref 80–100)
POC ACTIVATED CLOTTING TIME K: 135 SEC (ref 74–137)
POC ACTIVATED CLOTTING TIME K: 193 SEC (ref 74–137)
POC ACTIVATED CLOTTING TIME K: 222 SEC (ref 74–137)
POC ACTIVATED CLOTTING TIME K: 239 SEC (ref 74–137)
POC ACTIVATED CLOTTING TIME K: 262 SEC (ref 74–137)
POC ACTIVATED CLOTTING TIME K: 268 SEC (ref 74–137)
POC ACTIVATED CLOTTING TIME K: 285 SEC (ref 74–137)
POC ACTIVATED CLOTTING TIME K: 320 SEC (ref 74–137)
POC BE: -4 MMOL/L
POC BE: -5 MMOL/L
POC IONIZED CALCIUM: 1.11 MMOL/L (ref 1.06–1.42)
POC IONIZED CALCIUM: 1.17 MMOL/L (ref 1.06–1.42)
POC SATURATED O2: 98 % (ref 95–100)
POC SATURATED O2: 99 % (ref 95–100)
POC TCO2: 22 MMOL/L (ref 23–27)
POC TCO2: 23 MMOL/L (ref 23–27)
POTASSIUM BLD-SCNC: 4 MMOL/L (ref 3.5–5.1)
POTASSIUM BLD-SCNC: 4.6 MMOL/L (ref 3.5–5.1)
SAMPLE: ABNORMAL
SAMPLE: NORMAL
SODIUM BLD-SCNC: 141 MMOL/L (ref 136–145)
SODIUM BLD-SCNC: 141 MMOL/L (ref 136–145)
SPECIMEN OUTDATE: NORMAL

## 2025-03-05 PROCEDURE — 27201423 OPTIME MED/SURG SUP & DEVICES STERILE SUPPLY: Performed by: SURGERY

## 2025-03-05 PROCEDURE — 20600001 HC STEP DOWN PRIVATE ROOM

## 2025-03-05 PROCEDURE — 37000008 HC ANESTHESIA 1ST 15 MINUTES: Performed by: SURGERY

## 2025-03-05 PROCEDURE — 35656 BPG FEMORAL-POPLITEAL: CPT | Mod: 51,LT,GC, | Performed by: SURGERY

## 2025-03-05 PROCEDURE — 71000015 HC POSTOP RECOV 1ST HR: Performed by: SURGERY

## 2025-03-05 PROCEDURE — 94761 N-INVAS EAR/PLS OXIMETRY MLT: CPT

## 2025-03-05 PROCEDURE — 04CL0ZZ EXTIRPATION OF MATTER FROM LEFT FEMORAL ARTERY, OPEN APPROACH: ICD-10-PCS | Performed by: SURGERY

## 2025-03-05 PROCEDURE — 63600175 PHARM REV CODE 636 W HCPCS

## 2025-03-05 PROCEDURE — 041L0JJ BYPASS LEFT FEMORAL ARTERY TO LEFT FEMORAL ARTERY WITH SYNTHETIC SUBSTITUTE, OPEN APPROACH: ICD-10-PCS | Performed by: SURGERY

## 2025-03-05 PROCEDURE — 36000706: Performed by: SURGERY

## 2025-03-05 PROCEDURE — 25000003 PHARM REV CODE 250: Performed by: SURGERY

## 2025-03-05 PROCEDURE — 37000009 HC ANESTHESIA EA ADD 15 MINS: Performed by: SURGERY

## 2025-03-05 PROCEDURE — 27000221 HC OXYGEN, UP TO 24 HOURS

## 2025-03-05 PROCEDURE — 63600175 PHARM REV CODE 636 W HCPCS: Performed by: SURGERY

## 2025-03-05 PROCEDURE — 63600175 PHARM REV CODE 636 W HCPCS: Mod: JZ,TB

## 2025-03-05 PROCEDURE — 71000033 HC RECOVERY, INTIAL HOUR: Performed by: SURGERY

## 2025-03-05 PROCEDURE — 71000039 HC RECOVERY, EACH ADD'L HOUR: Performed by: SURGERY

## 2025-03-05 PROCEDURE — 86920 COMPATIBILITY TEST SPIN: CPT | Performed by: SURGERY

## 2025-03-05 PROCEDURE — 36000707: Performed by: SURGERY

## 2025-03-05 PROCEDURE — 25000003 PHARM REV CODE 250

## 2025-03-05 PROCEDURE — 27800903 OPTIME MED/SURG SUP & DEVICES OTHER IMPLANTS: Performed by: SURGERY

## 2025-03-05 PROCEDURE — 86901 BLOOD TYPING SEROLOGIC RH(D): CPT | Performed by: SURGERY

## 2025-03-05 PROCEDURE — 041J0JJ BYPASS LEFT EXTERNAL ILIAC ARTERY TO LEFT FEMORAL ARTERY WITH SYNTHETIC SUBSTITUTE, OPEN APPROACH: ICD-10-PCS | Performed by: SURGERY

## 2025-03-05 PROCEDURE — 35665 BPG ILIOFEMORAL: CPT | Mod: LT,GC,, | Performed by: SURGERY

## 2025-03-05 PROCEDURE — 36620 INSERTION CATHETER ARTERY: CPT | Mod: XU,GC,, | Performed by: ANESTHESIOLOGY

## 2025-03-05 PROCEDURE — 99900035 HC TECH TIME PER 15 MIN (STAT)

## 2025-03-05 PROCEDURE — 25000003 PHARM REV CODE 250: Performed by: STUDENT IN AN ORGANIZED HEALTH CARE EDUCATION/TRAINING PROGRAM

## 2025-03-05 RX ORDER — ALBUTEROL SULFATE 90 UG/1
2 INHALANT RESPIRATORY (INHALATION) EVERY 6 HOURS PRN
Status: DISCONTINUED | OUTPATIENT
Start: 2025-03-05 | End: 2025-03-06

## 2025-03-05 RX ORDER — AMLODIPINE BESYLATE 5 MG/1
5 TABLET ORAL DAILY
Status: DISCONTINUED | OUTPATIENT
Start: 2025-03-05 | End: 2025-03-07 | Stop reason: HOSPADM

## 2025-03-05 RX ORDER — PROPOFOL 10 MG/ML
VIAL (ML) INTRAVENOUS
Status: DISCONTINUED | OUTPATIENT
Start: 2025-03-05 | End: 2025-03-05

## 2025-03-05 RX ORDER — HYDROMORPHONE HYDROCHLORIDE 1 MG/ML
0.2 INJECTION, SOLUTION INTRAMUSCULAR; INTRAVENOUS; SUBCUTANEOUS EVERY 5 MIN PRN
Status: DISCONTINUED | OUTPATIENT
Start: 2025-03-05 | End: 2025-03-05 | Stop reason: HOSPADM

## 2025-03-05 RX ORDER — HALOPERIDOL LACTATE 5 MG/ML
0.5 INJECTION, SOLUTION INTRAMUSCULAR EVERY 10 MIN PRN
Status: COMPLETED | OUTPATIENT
Start: 2025-03-05 | End: 2025-03-05

## 2025-03-05 RX ORDER — PHENYLEPHRINE HYDROCHLORIDE 10 MG/ML
INJECTION INTRAVENOUS
Status: DISCONTINUED | OUTPATIENT
Start: 2025-03-05 | End: 2025-03-05

## 2025-03-05 RX ORDER — ONDANSETRON HYDROCHLORIDE 2 MG/ML
4 INJECTION, SOLUTION INTRAVENOUS EVERY 8 HOURS PRN
Status: DISCONTINUED | OUTPATIENT
Start: 2025-03-05 | End: 2025-03-07 | Stop reason: HOSPADM

## 2025-03-05 RX ORDER — SODIUM CHLORIDE 0.9 % (FLUSH) 0.9 %
10 SYRINGE (ML) INJECTION
Status: DISCONTINUED | OUTPATIENT
Start: 2025-03-05 | End: 2025-03-05 | Stop reason: HOSPADM

## 2025-03-05 RX ORDER — CARVEDILOL 6.25 MG/1
6.25 TABLET ORAL EVERY MORNING
Status: DISCONTINUED | OUTPATIENT
Start: 2025-03-05 | End: 2025-03-06

## 2025-03-05 RX ORDER — ROCURONIUM BROMIDE 10 MG/ML
INJECTION, SOLUTION INTRAVENOUS
Status: DISCONTINUED | OUTPATIENT
Start: 2025-03-05 | End: 2025-03-05

## 2025-03-05 RX ORDER — ACETAMINOPHEN 500 MG
1000 TABLET ORAL ONCE
Status: COMPLETED | OUTPATIENT
Start: 2025-03-05 | End: 2025-03-05

## 2025-03-05 RX ORDER — ONDANSETRON HYDROCHLORIDE 2 MG/ML
INJECTION, SOLUTION INTRAVENOUS
Status: DISCONTINUED | OUTPATIENT
Start: 2025-03-05 | End: 2025-03-05

## 2025-03-05 RX ORDER — HEPARIN SOD,PORCINE/0.9 % NACL 1000/500ML
INTRAVENOUS SOLUTION INTRAVENOUS
Status: DISCONTINUED | OUTPATIENT
Start: 2025-03-05 | End: 2025-03-05 | Stop reason: HOSPADM

## 2025-03-05 RX ORDER — SODIUM CHLORIDE 0.9 % (FLUSH) 0.9 %
10 SYRINGE (ML) INJECTION
Status: DISCONTINUED | OUTPATIENT
Start: 2025-03-05 | End: 2025-03-07 | Stop reason: HOSPADM

## 2025-03-05 RX ORDER — LIDOCAINE HYDROCHLORIDE 20 MG/ML
INJECTION, SOLUTION EPIDURAL; INFILTRATION; INTRACAUDAL; PERINEURAL
Status: DISCONTINUED | OUTPATIENT
Start: 2025-03-05 | End: 2025-03-05

## 2025-03-05 RX ORDER — CEFAZOLIN 2 G/1
2 INJECTION, POWDER, FOR SOLUTION INTRAMUSCULAR; INTRAVENOUS
Status: COMPLETED | OUTPATIENT
Start: 2025-03-05 | End: 2025-03-05

## 2025-03-05 RX ORDER — POLYETHYLENE GLYCOL 3350 17 G/17G
17 POWDER, FOR SOLUTION ORAL DAILY
Status: DISCONTINUED | OUTPATIENT
Start: 2025-03-05 | End: 2025-03-07 | Stop reason: HOSPADM

## 2025-03-05 RX ORDER — OXYCODONE HYDROCHLORIDE 5 MG/1
5 TABLET ORAL EVERY 4 HOURS PRN
Refills: 0 | Status: DISCONTINUED | OUTPATIENT
Start: 2025-03-05 | End: 2025-03-07 | Stop reason: HOSPADM

## 2025-03-05 RX ORDER — FENTANYL CITRATE 50 UG/ML
INJECTION, SOLUTION INTRAMUSCULAR; INTRAVENOUS
Status: DISCONTINUED | OUTPATIENT
Start: 2025-03-05 | End: 2025-03-05

## 2025-03-05 RX ORDER — DEXAMETHASONE SODIUM PHOSPHATE 4 MG/ML
INJECTION, SOLUTION INTRA-ARTICULAR; INTRALESIONAL; INTRAMUSCULAR; INTRAVENOUS; SOFT TISSUE
Status: DISCONTINUED | OUTPATIENT
Start: 2025-03-05 | End: 2025-03-05

## 2025-03-05 RX ORDER — ALBUTEROL SULFATE 90 UG/1
2 INHALANT RESPIRATORY (INHALATION) EVERY 6 HOURS PRN
Status: DISCONTINUED | OUTPATIENT
Start: 2025-03-05 | End: 2025-03-07 | Stop reason: HOSPADM

## 2025-03-05 RX ORDER — ASPIRIN 81 MG/1
81 TABLET ORAL DAILY
Status: DISCONTINUED | OUTPATIENT
Start: 2025-03-05 | End: 2025-03-07 | Stop reason: HOSPADM

## 2025-03-05 RX ORDER — KETAMINE HCL IN 0.9 % NACL 50 MG/5 ML
SYRINGE (ML) INTRAVENOUS
Status: DISCONTINUED | OUTPATIENT
Start: 2025-03-05 | End: 2025-03-05

## 2025-03-05 RX ORDER — GLUCAGON 1 MG
1 KIT INJECTION
Status: DISCONTINUED | OUTPATIENT
Start: 2025-03-05 | End: 2025-03-05 | Stop reason: HOSPADM

## 2025-03-05 RX ORDER — ACETAMINOPHEN 325 MG/1
650 TABLET ORAL EVERY 6 HOURS
Status: DISCONTINUED | OUTPATIENT
Start: 2025-03-05 | End: 2025-03-07 | Stop reason: HOSPADM

## 2025-03-05 RX ORDER — HEPARIN SODIUM 1000 [USP'U]/ML
INJECTION, SOLUTION INTRAVENOUS; SUBCUTANEOUS
Status: DISCONTINUED | OUTPATIENT
Start: 2025-03-05 | End: 2025-03-05

## 2025-03-05 RX ORDER — PROTAMINE SULFATE 10 MG/ML
INJECTION, SOLUTION INTRAVENOUS
Status: DISCONTINUED | OUTPATIENT
Start: 2025-03-05 | End: 2025-03-05

## 2025-03-05 RX ADMIN — PHENYLEPHRINE HYDROCHLORIDE 100 MCG: 10 INJECTION INTRAVENOUS at 01:03

## 2025-03-05 RX ADMIN — HALOPERIDOL LACTATE 0.5 MG: 5 INJECTION, SOLUTION INTRAMUSCULAR at 03:03

## 2025-03-05 RX ADMIN — FENTANYL CITRATE 25 MCG: 50 INJECTION INTRAMUSCULAR; INTRAVENOUS at 01:03

## 2025-03-05 RX ADMIN — FENTANYL CITRATE 50 MCG: 50 INJECTION INTRAMUSCULAR; INTRAVENOUS at 08:03

## 2025-03-05 RX ADMIN — PHENYLEPHRINE HYDROCHLORIDE 100 MCG: 10 INJECTION INTRAVENOUS at 08:03

## 2025-03-05 RX ADMIN — HEPARIN SODIUM 12000 UNITS: 1000 INJECTION, SOLUTION INTRAVENOUS; SUBCUTANEOUS at 11:03

## 2025-03-05 RX ADMIN — PROTAMINE SULFATE 5 MG: 10 INJECTION, SOLUTION INTRAVENOUS at 12:03

## 2025-03-05 RX ADMIN — ROCURONIUM BROMIDE 50 MG: 10 INJECTION, SOLUTION INTRAVENOUS at 08:03

## 2025-03-05 RX ADMIN — Medication 10 MG: at 09:03

## 2025-03-05 RX ADMIN — ROCURONIUM BROMIDE 20 MG: 10 INJECTION, SOLUTION INTRAVENOUS at 12:03

## 2025-03-05 RX ADMIN — OXYCODONE 5 MG: 5 TABLET ORAL at 03:03

## 2025-03-05 RX ADMIN — HEPARIN SODIUM 4000 UNITS: 1000 INJECTION, SOLUTION INTRAVENOUS; SUBCUTANEOUS at 11:03

## 2025-03-05 RX ADMIN — ROCURONIUM BROMIDE 20 MG: 10 INJECTION, SOLUTION INTRAVENOUS at 09:03

## 2025-03-05 RX ADMIN — PROPOFOL 150 MG: 10 INJECTION, EMULSION INTRAVENOUS at 08:03

## 2025-03-05 RX ADMIN — PHENYLEPHRINE HYDROCHLORIDE 0.4 MCG/KG/MIN: 10 INJECTION INTRAVENOUS at 09:03

## 2025-03-05 RX ADMIN — HYDROMORPHONE HYDROCHLORIDE 0.2 MG: 1 INJECTION, SOLUTION INTRAMUSCULAR; INTRAVENOUS; SUBCUTANEOUS at 03:03

## 2025-03-05 RX ADMIN — SUGAMMADEX 200 MG: 100 INJECTION, SOLUTION INTRAVENOUS at 01:03

## 2025-03-05 RX ADMIN — Medication 20 MG: at 08:03

## 2025-03-05 RX ADMIN — AMLODIPINE BESYLATE 5 MG: 5 TABLET ORAL at 03:03

## 2025-03-05 RX ADMIN — CEFAZOLIN 2 G: 330 INJECTION, POWDER, FOR SOLUTION INTRAMUSCULAR; INTRAVENOUS at 08:03

## 2025-03-05 RX ADMIN — PROPOFOL 30 MG: 10 INJECTION, EMULSION INTRAVENOUS at 01:03

## 2025-03-05 RX ADMIN — ROCURONIUM BROMIDE 30 MG: 10 INJECTION, SOLUTION INTRAVENOUS at 10:03

## 2025-03-05 RX ADMIN — SODIUM CHLORIDE: 0.9 INJECTION, SOLUTION INTRAVENOUS at 07:03

## 2025-03-05 RX ADMIN — HEPARIN SODIUM 2000 UNITS: 1000 INJECTION, SOLUTION INTRAVENOUS; SUBCUTANEOUS at 12:03

## 2025-03-05 RX ADMIN — ROCURONIUM BROMIDE 30 MG: 10 INJECTION, SOLUTION INTRAVENOUS at 08:03

## 2025-03-05 RX ADMIN — SODIUM CHLORIDE: 0.9 INJECTION, SOLUTION INTRAVENOUS at 11:03

## 2025-03-05 RX ADMIN — LIDOCAINE HYDROCHLORIDE 100 MG: 20 INJECTION, SOLUTION EPIDURAL; INFILTRATION; INTRACAUDAL; PERINEURAL at 08:03

## 2025-03-05 RX ADMIN — PHENYLEPHRINE HYDROCHLORIDE 100 MCG: 10 INJECTION INTRAVENOUS at 10:03

## 2025-03-05 RX ADMIN — ROCURONIUM BROMIDE 30 MG: 10 INJECTION, SOLUTION INTRAVENOUS at 11:03

## 2025-03-05 RX ADMIN — ACETAMINOPHEN 1000 MG: 500 TABLET ORAL at 06:03

## 2025-03-05 RX ADMIN — CARVEDILOL 6.25 MG: 6.25 TABLET, FILM COATED ORAL at 03:03

## 2025-03-05 RX ADMIN — PROTAMINE SULFATE 25 MG: 10 INJECTION, SOLUTION INTRAVENOUS at 12:03

## 2025-03-05 RX ADMIN — ONDANSETRON 4 MG: 2 INJECTION INTRAMUSCULAR; INTRAVENOUS at 01:03

## 2025-03-05 RX ADMIN — PROPOFOL 50 MG: 10 INJECTION, EMULSION INTRAVENOUS at 01:03

## 2025-03-05 RX ADMIN — ASPIRIN 81 MG: 81 TABLET, COATED ORAL at 03:03

## 2025-03-05 RX ADMIN — HEPARIN SODIUM 2000 UNITS: 1000 INJECTION, SOLUTION INTRAVENOUS; SUBCUTANEOUS at 11:03

## 2025-03-05 RX ADMIN — Medication 10 MG: at 10:03

## 2025-03-05 RX ADMIN — DEXAMETHASONE SODIUM PHOSPHATE 4 MG: 4 INJECTION INTRA-ARTICULAR; INTRALESIONAL; INTRAMUSCULAR; INTRAVENOUS; SOFT TISSUE at 08:03

## 2025-03-05 RX ADMIN — HEPARIN SODIUM 3000 UNITS: 1000 INJECTION, SOLUTION INTRAVENOUS; SUBCUTANEOUS at 11:03

## 2025-03-05 RX ADMIN — Medication 10 MG: at 11:03

## 2025-03-05 NOTE — OP NOTE
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 Item Serial No.  Lot No. LRB No. Used Action   [place]-Fawad Vascular Graft FEP Removable Ring     [id number]     Left 1 Implanted         Operative Findings:   Large common femoral artery aneurysm that included proximal profunda and to the takeoff of SFA including distal externa iliac artery.      L EIA to PFA end to end anastomosis with CFA (end to side) into SFA end to end.      Two incision exposure with proximal paramedian incision for iliac control and standard longitudinal groi incision for distal control.      Palpable DP on left at end of case.      Estimated Blood Loss: 100 cc    Indications: This patient is a 69-year-old male presenting electively with bilateral common femoral artery aneurysms.  He was offered an open repair on the left side 1st as it was larger.  He is status post bilateral popliteal artery aneurysm repairs.  Informed consent was obtained.    Procedure in Detail:   The patient was brought to the operating room and placed supine on the operating room table.  General anesthesia was induced.  Arterial line and Li catheter were placed under sterile conditions.  The left hip was bumped a proximally 10°.  The patient's bilateral groins abdomen and left flank were prepped and draped in sterile fashion.  Preoperative antibiotics were administered.    We began with a paramedian incision to the left of the umbilicus with 10 blade scalpel  and this was carried down through subcutaneous tissues with Bovie electrocautery.  The lateral border of the rectus muscle was identified and after an sizing anterior rectus sheath and transversalis, a retroperitoneal plane was identified and developed bluntly.  The psoas muscle was identified and the left external iliac artery was encountered.  At this point, our Omni retracting system was placed in standard fashion.  The external iliac artery was circumferentially dissected with Metzenbaum scissors and encircled with silastic vessel loop proximally.  Inferior epigastric and circumflex arteries were also encircled and controlled.    We then turned our attention to the left groin and a longitudinal incision overlying the common femoral artery aneurysm was made with 10 blade scalpel and carried down through subcutaneous tissues with Bovie electrocautery.  The femoral sheath was incised and the superficial femoral artery was circumferentially dissected and encircled with a blue silastic vessel loop.  We were then able to identify and control the main profunda branch which was coursing directly posterior.  The main profunda vein branches were also preserved.  Working proximally, we were then able to meticulously dissect the aneurysm capsule off the aneurysm on the anterior surface.  The greater saphenous vein was encountered and ligated both proximally and distally with 2-0 silk sutures and transected as it was unable to be preserved as it crossed immediately over the aneurysm and our planned interposition bypass.  Small crossing veins and lymphatics were also controlled with 2-0 silk ties and ligated with scissors.    At this point, a 10 mm ringed PTFE graft was selected and sterilely on the back table was prepared for interposition end-to-end anastomoses.  An elliptical incision was made on the graft and a 2nd limb was sutured with 5-0 [place]-Fawad in standard end-to-side fashion in running manner.   The patient was then  systemically heparinized with [id number] units of heparin to maintain an ACT greater than 250 and serial ECTs were obtained throughout the case.  We started with our proximal anastomosis.  The proximal and distal external iliac artery were controlled with profunda clamps  the superficial femoral artery and profunda were also controlled with clamps.  The femoral artery aneurysm was opened with Bovie electrocautery and the arteriotomy was carried proximally with Malik scissors.  Mural thrombus was encountered and removed.  From the inside of the artery we passed our ringed PTFE graft to then meet our paramedian incision and delivered through the previously transected external iliac artery. There was small back bleeding noted within the aneurysm sack and these were controlled with figure of 8 silk sutures. The external iliac artery was transected and end-to-end anastomosis was performed in standard fashion with 5-0 [place]-Fawad beginning at the posterior aspect and running anteriorly. Our proximal clamp was then taken off and our anastomosis was observed to be completely hemostatic and no repair stitches were needed.  Our clamp was then moved distally.  We then performed an end-to-end anastomosis with our previously fashioned back table bifurcated PTFE to the profunda artery in similar fashion as I just described.  To accommodate the slight size mismatch both ends were slightly beveled.  Finally, we performed an end-to-end anastomosis onto the SFA in similar fashion.  The graft was de-aired and flushed both proximally and distally before completing our anastomosis.  Once all clamps were released hemostasis was observed and no repair stitches were needed.  Distal to our anastomosis, the Doppler ultrasound was used and determined to be triphasic flow.  Hemostasis was obtained with Bovie electrocautery and the wounds were copiously irrigated.  Hemoblast hemostatic powder and Gelfoam thrombin were also used to obtain  hemostasis.    Satisfied with our hemostasis in both our paramedian and groin incisions, the wounds were then again copiously irrigated and closed in multiple layers.  Of note, the aneurysm sac in the left groin incision was closed with silk suture.  Remaining layers were closed with 2-0 Vicryl and 3-0 Vicryl and 4-0 nylon for the skin.  0 loop PDS was used to close the fascia in the paramedian incision and staples were used at the skin.  Dry sterile dressing was then placed.  All counts were reported correct at the end of the case.  The patient was liberated from anesthesia and brought to the PACU in stable condition.      No complications were identified.  The patient had a palpable DP pulse in the left leg at the end of the case.  Dr. Childers was present and scrubbed for the entirety of the case.

## 2025-03-05 NOTE — BRIEF OP NOTE
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 Item Serial No.  Lot No. LRB No. Used Action   [place]-Fawad Vascular Graft FEP Removable Ring   [id number]   Left 1 Implanted       Operative Findings:   Large common femoral artery aneurysm that included proximal profunda and to the takeoff of SFA including distal externa iliac artery.     L EIA to PFA end to end anastomosis with CFA (end to side) into SFA end to end.     Two incision exposure with proximal paramedian incision for iliac control and standard longitudinal groi incision for distal control.     Palpable DP on left at end of case.     Estimated Blood Loss: 100 cc      [id number]

## 2025-03-05 NOTE — PLAN OF CARE
Chart reviewed. Preop nursing care completed per orders with use of son as  per pt request. Informed pt video  will need to be used for with physicians/any consents-pt verbalized understanding. Safe surgery checklist complete aside from H&P update, anesthesia consent, and site caden . Family at bedside and to take belongings. Call bell within reach. Instructed pt to call for assistance.

## 2025-03-05 NOTE — ANESTHESIA PROCEDURE NOTES
Arterial    Diagnosis: Femoral artery aneurysm    Patient location during procedure: done in OR    Staffing  Authorizing Provider: Jamin Cooney MD  Performing Provider: Ethan Barcenas MD    Staffing  Performed by: Ethan Barcenas MD  Authorized by: Jamin Cooney MD    Anesthesiologist was present at the time of the procedure.    Preanesthetic Checklist  Completed: patient identified, IV checked, site marked, risks and benefits discussed, surgical consent, monitors and equipment checked, pre-op evaluation, timeout performed and anesthesia consent givenArterial  Skin Prep: chlorhexidine gluconate  Local Infiltration: none  Orientation: left  Location: radial    Catheter Size: 20 G  Catheter placement by Ultrasound guidance. Heme positive aspiration all ports.   Vessel Caliber: large, patent  Needle advanced into vessel with real time Ultrasound guidance.Insertion Attempts: 1  Assessment  Dressing: secured with tape and tegaderm  Patient: Tolerated well

## 2025-03-05 NOTE — ANESTHESIA PROCEDURE NOTES
Intubation    Date/Time: 3/5/2025 8:09 AM    Performed by: Ethan Barcenas MD  Authorized by: Jamin Cooney MD    Intubation:     Induction:  Intravenous    Intubated:  Postinduction    Mask Ventilation:  Moderately difficult with oral airway    Attempts:  1    Attempted By:  Resident anesthesiologist    Method of Intubation:  Direct    Blade:  Amelia 3    Laryngeal View Grade: Grade I - full view of cords      Difficult Airway Encountered?: No      Complications:  None    Airway Device:  Oral endotracheal tube    Airway Device Size:  7.5    Tube secured:  22    Secured at:  The gums    Placement Verified By:  Capnometry    Complicating Factors:  None    Findings Post-Intubation:  Atraumatic/condition of teeth unchanged and BS equal bilateral

## 2025-03-05 NOTE — NURSING TRANSFER
Nursing Transfer Note      3/5/2025   4:27 PM    Nurse giving handoff: LINUS Vargas  Nurse receiving handoff:LINUS [place]      Transfer To: [id number]    Transfer via bed    Transfer with cardiac monitoring    Transported by RN and PCT    TTelemetry: Cardiac Monitor  Order for Tele Monitor? Yes    Additional Lines: Li Catheter    Patient belongings transferred with patient: No    Chart send with patient: Yes    Notified: spouse, son    Patient reassessed at: [id number]

## 2025-03-05 NOTE — TRANSFER OF CARE
"Anesthesia Transfer of Care Note    Patient: Tan Gilliam    Procedure(s) Performed: Procedure(s) (LRB):  REPAIR, ANEURYSM, ARTERY, FEMORAL (Left)    Patient location: PACU    Anesthesia Type: general    Transport from OR: Transported from OR on 6-10 L/min O2 by face mask with adequate spontaneous ventilation    Post pain: adequate analgesia    Post assessment: no apparent anesthetic complications    Post vital signs: stable    Level of consciousness: awake    Nausea/Vomiting: no nausea/vomiting    Complications: none    Transfer of care protocol was followed      Last vitals: Visit Vitals  /79 (BP Location: Left arm, Patient Position: Lying)   Pulse 63   Temp 37.1 °C (98.8 °F) (Temporal)   Resp 20   Ht 5' 9" (1.753 m)   Wt 90.3 kg (199 lb)   SpO2 99%   BMI 29.39 kg/m²     "

## 2025-03-06 LAB
ANION GAP SERPL CALC-SCNC: 7 MMOL/L (ref 8–16)
BASOPHILS # BLD AUTO: 0.02 K/UL (ref 0–0.2)
BASOPHILS NFR BLD: 0.2 % (ref 0–1.9)
BUN SERPL-MCNC: 17 MG/DL (ref 8–23)
CALCIUM SERPL-MCNC: 7.9 MG/DL (ref 8.7–10.5)
CHLORIDE SERPL-SCNC: 108 MMOL/L (ref 95–110)
CO2 SERPL-SCNC: 23 MMOL/L (ref 23–29)
CREAT SERPL-MCNC: 1 MG/DL (ref 0.5–1.4)
DIFFERENTIAL METHOD BLD: ABNORMAL
EOSINOPHIL # BLD AUTO: 0 K/UL (ref 0–0.5)
EOSINOPHIL NFR BLD: 0 % (ref 0–8)
ERYTHROCYTE [DISTWIDTH] IN BLOOD BY AUTOMATED COUNT: 14.3 % (ref 11.5–14.5)
EST. GFR  (NO RACE VARIABLE): >60 ML/MIN/1.73 M^2
GLUCOSE SERPL-MCNC: 110 MG/DL (ref 70–110)
HCT VFR BLD AUTO: 26.1 % (ref 40–54)
HGB BLD-MCNC: 8.1 G/DL (ref 14–18)
IMM GRANULOCYTES # BLD AUTO: 0.03 K/UL (ref 0–0.04)
IMM GRANULOCYTES NFR BLD AUTO: 0.3 % (ref 0–0.5)
LYMPHOCYTES # BLD AUTO: 2.1 K/UL (ref 1–4.8)
LYMPHOCYTES NFR BLD: 17.1 % (ref 18–48)
MCH RBC QN AUTO: 24.2 PG (ref 27–31)
MCHC RBC AUTO-ENTMCNC: 31 G/DL (ref 32–36)
MCV RBC AUTO: 78 FL (ref 82–98)
MONOCYTES # BLD AUTO: 1.4 K/UL (ref 0.3–1)
MONOCYTES NFR BLD: 11.7 % (ref 4–15)
NEUTROPHILS # BLD AUTO: 8.5 K/UL (ref 1.8–7.7)
NEUTROPHILS NFR BLD: 70.7 % (ref 38–73)
NRBC BLD-RTO: 0 /100 WBC
PLATELET # BLD AUTO: 235 K/UL (ref 150–450)
PMV BLD AUTO: 10.5 FL (ref 9.2–12.9)
POTASSIUM SERPL-SCNC: 4.4 MMOL/L (ref 3.5–5.1)
RBC # BLD AUTO: 3.35 M/UL (ref 4.6–6.2)
SODIUM SERPL-SCNC: 138 MMOL/L (ref 136–145)
WBC # BLD AUTO: 12 K/UL (ref 3.9–12.7)

## 2025-03-06 PROCEDURE — 25000003 PHARM REV CODE 250

## 2025-03-06 PROCEDURE — 25000003 PHARM REV CODE 250: Performed by: STUDENT IN AN ORGANIZED HEALTH CARE EDUCATION/TRAINING PROGRAM

## 2025-03-06 PROCEDURE — 80048 BASIC METABOLIC PNL TOTAL CA: CPT | Performed by: STUDENT IN AN ORGANIZED HEALTH CARE EDUCATION/TRAINING PROGRAM

## 2025-03-06 PROCEDURE — 25000242 PHARM REV CODE 250 ALT 637 W/ HCPCS

## 2025-03-06 PROCEDURE — 63600175 PHARM REV CODE 636 W HCPCS

## 2025-03-06 PROCEDURE — 85025 COMPLETE CBC W/AUTO DIFF WBC: CPT | Performed by: STUDENT IN AN ORGANIZED HEALTH CARE EDUCATION/TRAINING PROGRAM

## 2025-03-06 PROCEDURE — 20600001 HC STEP DOWN PRIVATE ROOM

## 2025-03-06 PROCEDURE — 94761 N-INVAS EAR/PLS OXIMETRY MLT: CPT

## 2025-03-06 PROCEDURE — 94640 AIRWAY INHALATION TREATMENT: CPT

## 2025-03-06 RX ORDER — FLUTICASONE FUROATE AND VILANTEROL 200; 25 UG/1; UG/1
1 POWDER RESPIRATORY (INHALATION) DAILY
Status: DISCONTINUED | OUTPATIENT
Start: 2025-03-06 | End: 2025-03-07 | Stop reason: HOSPADM

## 2025-03-06 RX ORDER — METHOCARBAMOL 500 MG/1
500 TABLET, FILM COATED ORAL 3 TIMES DAILY
Status: DISCONTINUED | OUTPATIENT
Start: 2025-03-06 | End: 2025-03-07 | Stop reason: HOSPADM

## 2025-03-06 RX ORDER — HEPARIN SODIUM 5000 [USP'U]/ML
5000 INJECTION, SOLUTION INTRAVENOUS; SUBCUTANEOUS EVERY 8 HOURS
Status: DISCONTINUED | OUTPATIENT
Start: 2025-03-06 | End: 2025-03-07 | Stop reason: HOSPADM

## 2025-03-06 RX ORDER — CARVEDILOL 6.25 MG/1
6.25 TABLET ORAL 2 TIMES DAILY
Status: DISCONTINUED | OUTPATIENT
Start: 2025-03-06 | End: 2025-03-07 | Stop reason: HOSPADM

## 2025-03-06 RX ADMIN — OXYCODONE 5 MG: 5 TABLET ORAL at 09:03

## 2025-03-06 RX ADMIN — ASPIRIN 81 MG: 81 TABLET, COATED ORAL at 08:03

## 2025-03-06 RX ADMIN — ACETAMINOPHEN 650 MG: 325 TABLET ORAL at 11:03

## 2025-03-06 RX ADMIN — METHOCARBAMOL 500 MG: 500 TABLET ORAL at 09:03

## 2025-03-06 RX ADMIN — AMLODIPINE BESYLATE 5 MG: 5 TABLET ORAL at 08:03

## 2025-03-06 RX ADMIN — TIOTROPIUM BROMIDE INHALATION SPRAY 2 PUFF: 3.12 SPRAY, METERED RESPIRATORY (INHALATION) at 02:03

## 2025-03-06 RX ADMIN — HEPARIN SODIUM 5000 UNITS: 5000 INJECTION INTRAVENOUS; SUBCUTANEOUS at 09:03

## 2025-03-06 RX ADMIN — OXYCODONE 5 MG: 5 TABLET ORAL at 03:03

## 2025-03-06 RX ADMIN — POLYETHYLENE GLYCOL 3350 17 G: 17 POWDER, FOR SOLUTION ORAL at 08:03

## 2025-03-06 RX ADMIN — ACETAMINOPHEN 650 MG: 325 TABLET ORAL at 05:03

## 2025-03-06 RX ADMIN — FLUTICASONE FUROATE AND VILANTEROL TRIFENATATE 1 PUFF: 200; 25 POWDER RESPIRATORY (INHALATION) at 02:03

## 2025-03-06 RX ADMIN — METHOCARBAMOL 500 MG: 500 TABLET ORAL at 05:03

## 2025-03-06 RX ADMIN — CARVEDILOL 6.25 MG: 6.25 TABLET, FILM COATED ORAL at 09:03

## 2025-03-06 RX ADMIN — CARVEDILOL 6.25 MG: 6.25 TABLET, FILM COATED ORAL at 08:03

## 2025-03-06 NOTE — ANESTHESIA POSTPROCEDURE EVALUATION
Anesthesia Post Evaluation    Patient: Tan iGlliam    Procedure(s) Performed: Procedure(s) (LRB):  REPAIR, ANEURYSM, ARTERY, FEMORAL (Left)    Final Anesthesia Type: general      Patient location during evaluation: floor  Patient participation: Yes- Able to Participate  Level of consciousness: awake and alert and oriented  Post-procedure vital signs: reviewed and stable  Pain management: adequate  Airway patency: patent    PONV status at discharge: No PONV  Anesthetic complications: no      Cardiovascular status: hemodynamically stable  Respiratory status: unassisted, spontaneous ventilation and room air  Hydration status: euvolemic  Follow-up not needed.              Vitals Value Taken Time   /65 03/06/25 08:07   Temp 37.2 °C (99 °F) 03/06/25 08:07   Pulse 75 03/06/25 09:15   Resp 29 03/06/25 09:15   SpO2 95 % 03/06/25 09:15   Vitals shown include unfiled device data.      Event Time   Out of Recovery 15:15:00         Pain/Karo Score: Pain Rating Prior to Med Admin: 7 (3/5/2025  3:25 PM)  Pain Rating Post Med Admin: 3 (3/5/2025  3:53 PM)  Karo Score: 10 (3/5/2025  3:15 PM)

## 2025-03-06 NOTE — PROGRESS NOTES
[facility] - Inpatient General [facility]  Vascular Surgery  Progress Note    Patient Name: Tan Gilliam  MRN: [id number]  Admission Date: 3/5/2025  Primary Care Provider: Cory Che MD    Subjective:     Interval History: NAEON, AF/HDS, pain is well controlled on current regimen. No n/v, fever, chills. No additional concerns or complaints at this time.    Post-Op Info:  Procedure(s) (LRB):  REPAIR, ANEURYSM, ARTERY, FEMORAL (Left)   1 Day Post-Op     Medications:  Continuous Infusions:  Scheduled Meds:   acetaminophen  650 mg Oral Q6H    amLODIPine  5 mg Oral Daily    aspirin  81 mg Oral Daily    carvediloL  6.25 mg Oral QAM    polyethylene glycol  17 g Oral Daily     PRN Meds:  Current Facility-Administered Medications:     albuterol, 2 puff, Inhalation, Q6H PRN    ondansetron, 4 mg, Intravenous, Q8H PRN    oxyCODONE, 5 mg, Oral, Q4H PRN    sodium chloride 0.9%, 10 mL, Intravenous, PRN     Objective:     Vital Signs (Most Recent):  Temp: 97.5 °F (36.4 °C) (03/06/25 1114)  Pulse: 72 (03/06/25 1114)  Resp: (!) 23 (03/06/25 1114)  BP: (!) 140/76 (03/06/25 1114)  SpO2: 96 % (03/06/25 1208) Vital Signs (24h Range):  Temp:  [97 °F (36.1 °C)-99 °F (37.2 °C)] 97.5 °F (36.4 °C)  Pulse:  [57-76] 72  Resp:  [16-23] 23  SpO2:  [94 %-100 %] 96 %  BP: (100-140)/(57-76) 140/76  Arterial Line BP: (119-131)/(60-63) 124/62     Date 03/06/25 0700 - 03/07/25 0659   Shift 6374-8316 4353-1554 0642-3762 24 Hour Total   INTAKE   Shift Total(mL/kg)       OUTPUT   Urine(mL/kg/hr) 200   200   Shift Total(mL/kg) 200(2.2)   200(2.2)   Weight (kg) 90.3 90.3 90.3 90.3        Physical Exam  Constitutional:       Appearance: Normal appearance.   HENT:      Head: Normocephalic and atraumatic.   Cardiovascular:      Rate and Rhythm: Normal rate.      Comments: +2 palpable pulses in LLE  Abdominal:      General: There is no distension.      Comments: Abdominal incision c/d/I    Musculoskeletal:      Comments: LLE incision C/D/I   Skin:     General:  "Skin is warm and dry.   Neurological:      Mental Status: He is alert. Mental status is at baseline.          Significant Labs:  CBC:   Recent Labs   Lab 03/06/25  0445   WBC 12.00   RBC 3.35*   HGB 8.1*   HCT 26.1*      MCV 78*   MCH 24.2*   MCHC 31.0*     CMP:   Recent Labs   Lab 03/06/25  0445      CALCIUM 7.9*      K 4.4   CO2 23      BUN 17   CREATININE 1.0     Coagulation: No results for input(s): "LABPROT", "INR", "APTT" in the last 48 hours.    Significant Diagnostics:  I have reviewed all pertinent imaging results/findings within the past 24 hours.  Assessment/Plan:     Femoral artery aneurysm, left  Mr. Gilliam is a 69 y.o M s/p bypass of L. Femoral aneurysm on 3/05/2025.    - Keep for today  - diabetic diet  - DVT ppx: sub q heparin and ASA 81   - mmpc   - prn anti-emetics   - home meds as appropriate   - dispo: home likely tomorrow         Sky Tavares MD  Vascular Surgery  [facility] - Inpatient General Surgery  "

## 2025-03-06 NOTE — PLAN OF CARE
PCU Plan of Care    Patient Dx: <principal problem not specified>    Vital Signs (last 12 hours):   Temp:  [97.5 °F (36.4 °C)-99 °F (37.2 °C)]   Pulse:  [66-76]   Resp:  [16-30]   BP: (117-141)/(65-76)   SpO2:  [94 %-98 %]      Neuro: AAOx4 and Follows Commands    Cardiac: normal sinus rhythm    Respiratory: Room Air    Gtts: none    Frequent Checks: Neurovascular Checks q4h    Urine Output: Voids Spontaneously  600 mL/shift    Drains: none    Diet: Diabetic     Mobility: Up to Chair ; Assistance Required: 1-person Assistance      Skin:  see LDA for lines, drains and incisions.  Patient turned q2h, bony prominences protected, and mattress inflated/working correctly.   Waldemar Score: 17.    Shift Events:  See flowsheet for further assessment/details.  Family updated on current condition/plan of care, questions answered, and emotional support provided.  MD updated on current condition, vitals, labs, and gtts.          Nurses Note -- 4 Eyes  3/6/2025   5:53 PM      Skin assessed during: Routine Shift Assessment       [] No Altered Skin Integrity Present    [x]Prevention Measures Documented      [x] Yes- Altered Skin Integrity Present or Discovered   [] LDA Added if Not in Epic (Describe Wound)   [] New Altered Skin Integrity was Present on Admit and Documented in LDA   [] Wound Image Taken    Wound Care Consulted? No    Attending Nurse: jonas schaeffer    Second RN/Staff Member:

## 2025-03-06 NOTE — PLAN OF CARE
Problem: Wound  Goal: Optimal Coping  Outcome: Progressing  Goal: Optimal Functional Ability  Outcome: Progressing  Goal: Absence of Infection Signs and Symptoms  Outcome: Progressing  Goal: Improved Oral Intake  Outcome: Progressing  Goal: Optimal Pain Control and Function  Outcome: Progressing  Goal: Skin Health and Integrity  Outcome: Progressing  Goal: Optimal Wound Healing  Outcome: Progressing     Problem: Infection  Goal: Absence of Infection Signs and Symptoms  Outcome: Progressing     Problem: Fall Injury Risk  Goal: Absence of Fall and Fall-Related Injury  Outcome: Progressing     Problem: Skin Injury Risk Increased  Goal: Skin Health and Integrity  Outcome: Progressing

## 2025-03-06 NOTE — ASSESSMENT & PLAN NOTE
Mr. Gilliam is a 69 y.o M s/p bypass of L. Femoral aneurysm on 3/05/2025.    - Keep for today  - diabetic diet  - DVT ppx: sub q heparin and ASA 81   - mmpc   - prn anti-emetics   - home meds as appropriate   - dispo: home likely tomorrow

## 2025-03-06 NOTE — SUBJECTIVE & OBJECTIVE
"  Medications:  Continuous Infusions:  Scheduled Meds:   acetaminophen  650 mg Oral Q6H    amLODIPine  5 mg Oral Daily    aspirin  81 mg Oral Daily    carvediloL  6.25 mg Oral QAM    polyethylene glycol  17 g Oral Daily     PRN Meds:  Current Facility-Administered Medications:     albuterol, 2 puff, Inhalation, Q6H PRN    ondansetron, 4 mg, Intravenous, Q8H PRN    oxyCODONE, 5 mg, Oral, Q4H PRN    sodium chloride 0.9%, 10 mL, Intravenous, PRN     Objective:     Vital Signs (Most Recent):  Temp: 97.5 °F (36.4 °C) (03/06/25 1114)  Pulse: 72 (03/06/25 1114)  Resp: (!) 23 (03/06/25 1114)  BP: (!) 140/76 (03/06/25 1114)  SpO2: 96 % (03/06/25 1208) Vital Signs (24h Range):  Temp:  [97 °F (36.1 °C)-99 °F (37.2 °C)] 97.5 °F (36.4 °C)  Pulse:  [57-76] 72  Resp:  [16-23] 23  SpO2:  [94 %-100 %] 96 %  BP: (100-140)/(57-76) 140/76  Arterial Line BP: (119-131)/(60-63) 124/62     Date 03/06/25 0700 - 03/07/25 0659   Shift 4747-0008 7182-1796 0475-1967 24 Hour Total   INTAKE   Shift Total(mL/kg)       OUTPUT   Urine(mL/kg/hr) 200   200   Shift Total(mL/kg) 200(2.2)   200(2.2)   Weight (kg) 90.3 90.3 90.3 90.3        Physical Exam  Constitutional:       Appearance: Normal appearance.   HENT:      Head: Normocephalic and atraumatic.   Cardiovascular:      Rate and Rhythm: Normal rate.      Comments: +2 palpable pulses in LLE  Abdominal:      General: There is no distension.      Comments: Abdominal incision c/d/I    Musculoskeletal:      Comments: LLE incision C/D/I   Skin:     General: Skin is warm and dry.   Neurological:      Mental Status: He is alert. Mental status is at baseline.          Significant Labs:  CBC:   Recent Labs   Lab 03/06/25  0445   WBC 12.00   RBC 3.35*   HGB 8.1*   HCT 26.1*      MCV 78*   MCH 24.2*   MCHC 31.0*     CMP:   Recent Labs   Lab 03/06/25  0445      CALCIUM 7.9*      K 4.4   CO2 23      BUN 17   CREATININE 1.0     Coagulation: No results for input(s): "LABPROT", "INR", " ""APTT" in the last 48 hours.    Significant Diagnostics:  I have reviewed all pertinent imaging results/findings within the past 24 hours.  "

## 2025-03-07 VITALS
WEIGHT: 199.06 LBS | HEART RATE: 66 BPM | BODY MASS INDEX: 29.48 KG/M2 | RESPIRATION RATE: 24 BRPM | TEMPERATURE: 98 F | HEIGHT: 69 IN | SYSTOLIC BLOOD PRESSURE: 98 MMHG | OXYGEN SATURATION: 94 % | DIASTOLIC BLOOD PRESSURE: 53 MMHG

## 2025-03-07 LAB
BASOPHILS # BLD AUTO: 0.04 K/UL (ref 0–0.2)
BASOPHILS NFR BLD: 0.3 % (ref 0–1.9)
DIFFERENTIAL METHOD BLD: ABNORMAL
EOSINOPHIL # BLD AUTO: 0 K/UL (ref 0–0.5)
EOSINOPHIL NFR BLD: 0.1 % (ref 0–8)
ERYTHROCYTE [DISTWIDTH] IN BLOOD BY AUTOMATED COUNT: 14.6 % (ref 11.5–14.5)
HCT VFR BLD AUTO: 26.9 % (ref 40–54)
HGB BLD-MCNC: 8.3 G/DL (ref 14–18)
IMM GRANULOCYTES # BLD AUTO: 0.08 K/UL (ref 0–0.04)
IMM GRANULOCYTES NFR BLD AUTO: 0.6 % (ref 0–0.5)
LYMPHOCYTES # BLD AUTO: 1.9 K/UL (ref 1–4.8)
LYMPHOCYTES NFR BLD: 14.3 % (ref 18–48)
MCH RBC QN AUTO: 24.1 PG (ref 27–31)
MCHC RBC AUTO-ENTMCNC: 30.9 G/DL (ref 32–36)
MCV RBC AUTO: 78 FL (ref 82–98)
MONOCYTES # BLD AUTO: 1.8 K/UL (ref 0.3–1)
MONOCYTES NFR BLD: 13.3 % (ref 4–15)
NEUTROPHILS # BLD AUTO: 9.4 K/UL (ref 1.8–7.7)
NEUTROPHILS NFR BLD: 71.4 % (ref 38–73)
NRBC BLD-RTO: 0 /100 WBC
PLATELET # BLD AUTO: 199 K/UL (ref 150–450)
PMV BLD AUTO: 10 FL (ref 9.2–12.9)
RBC # BLD AUTO: 3.45 M/UL (ref 4.6–6.2)
WBC # BLD AUTO: 13.2 K/UL (ref 3.9–12.7)

## 2025-03-07 PROCEDURE — 36415 COLL VENOUS BLD VENIPUNCTURE: CPT | Performed by: STUDENT IN AN ORGANIZED HEALTH CARE EDUCATION/TRAINING PROGRAM

## 2025-03-07 PROCEDURE — 25000003 PHARM REV CODE 250

## 2025-03-07 PROCEDURE — 85025 COMPLETE CBC W/AUTO DIFF WBC: CPT | Performed by: STUDENT IN AN ORGANIZED HEALTH CARE EDUCATION/TRAINING PROGRAM

## 2025-03-07 PROCEDURE — 63600175 PHARM REV CODE 636 W HCPCS

## 2025-03-07 PROCEDURE — 25000003 PHARM REV CODE 250: Performed by: STUDENT IN AN ORGANIZED HEALTH CARE EDUCATION/TRAINING PROGRAM

## 2025-03-07 RX ORDER — OXYCODONE HYDROCHLORIDE 10 MG/1
10 TABLET ORAL EVERY 4 HOURS PRN
Status: DISCONTINUED | OUTPATIENT
Start: 2025-03-07 | End: 2025-03-07 | Stop reason: HOSPADM

## 2025-03-07 RX ORDER — OXYCODONE HCL 10 MG/1
10 TABLET, FILM COATED, EXTENDED RELEASE ORAL ONCE AS NEEDED
Refills: 0 | Status: COMPLETED | OUTPATIENT
Start: 2025-03-07 | End: 2025-03-07

## 2025-03-07 RX ORDER — CYCLOBENZAPRINE HCL 5 MG
5 TABLET ORAL 3 TIMES DAILY PRN
Qty: 42 TABLET | Refills: 0 | Status: SHIPPED | OUTPATIENT
Start: 2025-03-07 | End: 2025-03-21

## 2025-03-07 RX ORDER — HYDROMORPHONE HYDROCHLORIDE 4 MG/1
2 TABLET ORAL EVERY 4 HOURS PRN
Qty: 21 TABLET | Refills: 0 | Status: SHIPPED | OUTPATIENT
Start: 2025-03-07

## 2025-03-07 RX ORDER — TALC
9 POWDER (GRAM) TOPICAL NIGHTLY PRN
Status: DISCONTINUED | OUTPATIENT
Start: 2025-03-07 | End: 2025-03-07 | Stop reason: HOSPADM

## 2025-03-07 RX ORDER — OXYCODONE HCL 10 MG/1
10 TABLET, FILM COATED, EXTENDED RELEASE ORAL ONCE
Refills: 0 | Status: DISCONTINUED | OUTPATIENT
Start: 2025-03-07 | End: 2025-03-07

## 2025-03-07 RX ADMIN — ASPIRIN 81 MG: 81 TABLET, COATED ORAL at 08:03

## 2025-03-07 RX ADMIN — TIOTROPIUM BROMIDE INHALATION SPRAY 2 PUFF: 3.12 SPRAY, METERED RESPIRATORY (INHALATION) at 08:03

## 2025-03-07 RX ADMIN — AMLODIPINE BESYLATE 5 MG: 5 TABLET ORAL at 08:03

## 2025-03-07 RX ADMIN — POLYETHYLENE GLYCOL 3350 17 G: 17 POWDER, FOR SOLUTION ORAL at 08:03

## 2025-03-07 RX ADMIN — FLUTICASONE FUROATE AND VILANTEROL TRIFENATATE 1 PUFF: 200; 25 POWDER RESPIRATORY (INHALATION) at 08:03

## 2025-03-07 RX ADMIN — METHOCARBAMOL 500 MG: 500 TABLET ORAL at 02:03

## 2025-03-07 RX ADMIN — METHOCARBAMOL 500 MG: 500 TABLET ORAL at 08:03

## 2025-03-07 RX ADMIN — Medication 9 MG: at 12:03

## 2025-03-07 RX ADMIN — HEPARIN SODIUM 5000 UNITS: 5000 INJECTION INTRAVENOUS; SUBCUTANEOUS at 02:03

## 2025-03-07 RX ADMIN — HEPARIN SODIUM 5000 UNITS: 5000 INJECTION INTRAVENOUS; SUBCUTANEOUS at 05:03

## 2025-03-07 RX ADMIN — OXYCODONE HYDROCHLORIDE 10 MG: 10 TABLET, FILM COATED, EXTENDED RELEASE ORAL at 12:03

## 2025-03-07 RX ADMIN — ACETAMINOPHEN 650 MG: 325 TABLET ORAL at 05:03

## 2025-03-07 RX ADMIN — CARVEDILOL 6.25 MG: 6.25 TABLET, FILM COATED ORAL at 08:03

## 2025-03-07 NOTE — DISCHARGE INSTRUCTIONS
GROIN WOUND CARE:  It is very important that the wound in your groin stays dry and clean because this area collects moisture and bacteria which greatly increases the risk for infection  and wound complications.    EVERY DAY:  - Starting two days after your surgery, you may shower  - Following your shower, pat the wound dry  - Paint the wound with a small amount of betadine  - Cover the wound with 4x4 gauze and tape  - Change the dressing in the same fashion every day, sooner if the gauze gets saturated with fluid  - Please no heavy lifting, pushing, pulling for at least 4 weeks from day of surgery.    CONTACT US IMMEDIATELY  - Is getting more painful with time not less  - Becomes red  - Has pus coming out  - Falls apart   - Or you are having fevers

## 2025-03-07 NOTE — PLAN OF CARE
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]    Admission Date: 3/5/2025  Expected Discharge Date: 3/7/2025         Payor: MEDICARE / Plan: MEDICARE PART A & B / Product Type: Government /     Extended Emergency Contact Information  Primary Emergency Contact: Vishnu Gilliam  Mobile Phone: [phone]  Relation: Son  Secondary Emergency Contact: Ryann Rowe   [place]  Mobile Phone: [phone]  Relation: Spouse    Discharge Plan A: Home  Discharge Plan B: Home      [facility] - [place], [place] - [address]  [address] [facility]  [place] [address]  Phone: [phone] Fax: [phone]    [facility]  [address]  [place] [address]  Phone: [phone] Fax: [phone]    [facility] - [place], [place] - [address]  [address] [address]  Phone: [phone] Fax: [phone]      Initial Assessment (most recent)       Adult Discharge Assessment - 03/07/25 1548          Discharge Assessment    Assessment Type Discharge Planning Assessment     Confirmed/corrected address, phone number and insurance Yes     Confirmed Demographics Correct on Facesheet     Source of Information patient;family   Pt wife- Ryann Rowe at bedside    If unable to respond/provide information was family/caregiver contacted? --   Interpeter was used- Brenda # [id number]    Does patient/caregiver understand observation status Yes     Communicated TIARRA with patient/caregiver --   Pt discharging today    Reason For Admission Femoral artery aneurysm, left     People in Home spouse;child(yulisa), adult     Facility Arrived From: Home     Do you expect to return to your current living situation? Yes     Do you have help at home or someone to help you manage your care at  home? Yes     Who are your caregiver(s) and their phone number(s)? Yes Son-Vishnu [phone] and wife- Ryann Rowe [phone]     Prior to hospitilization cognitive status: Alert/Oriented     Current cognitive status: Alert/Oriented     Walking or Climbing Stairs Difficulty yes   Per pt wife pt uses a rolling walker    Walking or Climbing Stairs ambulation difficulty, requires equipment     Dressing/Bathing Difficulty no     Home Layout Able to live on 1st floor     Equipment Currently Used at Home walker, rolling     Readmission within 30 days? No     Patient currently being followed by outpatient case management? No     Do you currently have service(s) that help you manage your care at home? No     Do you take prescription medications? Yes     Do you have prescription coverage? Yes     Coverage MEDICARE - MEDICARE PART A & B     Do you have any problems affording any of your prescribed medications? No     Is the patient taking medications as prescribed? yes     Who is going to help you get home at discharge? Pt son will pick pt up     How do you get to doctors appointments? family or friend will provide     Are you on dialysis? No     Do you take coumadin? No   Per pt wife pt on Xarelto    Discharge Plan A Home     Discharge Plan B Home     DME Needed Upon Discharge  none     Discharge Plan discussed with: Patient;Spouse/sig other        Physical Activity    On average, how many days per week do you engage in moderate to strenuous exercise (like a brisk walk)? Patient unable to answer     On average, how many minutes do you engage in exercise at this level? Patient unable to answer        Financial Resource Strain    How hard is it for you to pay for the very basics like food, housing, medical care, and heating? Patient unable to answer        Housing Stability    In the last 12 months, was there a time when you were not able to pay the mortgage or rent on time? Patient unable to answer     At any time in  the past 12 months, were you homeless or living in a shelter (including now)? Patient unable to answer        Transportation Needs    Has the lack of transportation kept you from medical appointments, meetings, work or from getting things needed for daily living? Patient unable to answer        Food Insecurity    Within the past 12 months, you worried that your food would run out before you got the money to buy more. Patient unable to answer     Within the past 12 months, the food you bought just didn't last and you didn't have money to get more. Patient unable to answer        Stress    Do you feel stress - tense, restless, nervous, or anxious, or unable to sleep at night because your mind is troubled all the time - these days? Patient unable to answer        Social Isolation    How often do you feel lonely or isolated from those around you?  Patient unable to answer        Alcohol Use    Q1: How often do you have a drink containing alcohol? Patient unable to answer     Q2: How many drinks containing alcohol do you have on a typical day when you are drinking? Patient unable to answer     Q3: How often do you have six or more drinks on one occasion? Patient unable to answer        Utilities    In the past 12 months has the electric, gas, oil, or water company threatened to shut off services in your home? Patient unable to answer        Health Literacy    How often do you need to have someone help you when you read instructions, pamphlets, or other written material from your doctor or pharmacy? Patient unable to respond                 SW met with patient and pt wife-Therease  in room to complete DPA. Interpeter Brenda # [id number]. Questions answered / contact numbers provided. Pt gave permission for wife to answer questions due to him currently in pain. Use PREFERRED PHARMACY / BEDSIDE DELIVERY for any necessary medications at time of discharge. Pt lives in a 1 story home w/ wife and son, 1 step to enter the home (but  utilized garage when needed). Pt is independent with ADLs - does use DME, In-home equipment (rolling walker), is not on HD, BTs or home oxygen.Pt wife and son will be assisting with help upon discharge. Per pt wife, their son will be providing transportation home. Hospital follow up will be scheduled with PCP. Will continue to follow for course of hospitalization. SW verified PCP and insurance. IMM was explained and pt and wife agreed and pt signed IMM. SW provided pt wife a copy and inputted signed IMM in pt chart on floor.      Discharge Plan A and Plan B have been determined by review of patient's clinical status, future medical and therapeutic needs, and coverage/benefits for post-acute care in coordination with multidisciplinary team members.    MARIXA Blair   [facility]    Case Management Dept  [phone]

## 2025-03-07 NOTE — DISCHARGE SUMMARY
[facility] - Inpatient General [facility]  Vascular Surgery  Discharge Summary      Patient Name: Tan Gilliam  MRN: [id number]  Admission Date: 3/5/2025  Hospital Length of Stay: 2 days  Discharge Date and Time:  03/07/2025 12:44 PM  Attending Physician: MOI Childers II, MD   Discharging Provider: Sky Tavares MD  Primary Care Provider: Cory Che MD    HPI:   No notes on file    Procedure(s) (LRB):  REPAIR, ANEURYSM, ARTERY, FEMORAL (Left)     Hospital Course: Mr. Gilliam is a 69 y.o M who was admitted on 3/05/2025 in order to undergo L. Femoral artery aneurysm repair. He was taken back to the OR the same day and the operation was performed with no apparent complications. Patient was extubated in the OR and transferred to PACU in stable condition and then to the PCU. While inpatient he has remained AF/HDS, laboratory workup has been within normal limits. His pain is controlled, he is tolerating a diet, and passing flatus. He is now medically appropriate for discharge home. He will follow up in clinic in 2 weeks.    Goals of Care Treatment Preferences:  Code Status: Full Code      Consults:     Significant Diagnostic Studies: Labs: CMP   Recent Labs   Lab 03/06/25  0445      K 4.4      CO2 23      BUN 17   CREATININE 1.0   CALCIUM 7.9*   ANIONGAP 7*    and CBC   Recent Labs   Lab 03/06/25  0445 03/07/25  0715   WBC 12.00 13.20*   HGB 8.1* 8.3*   HCT 26.1* 26.9*    199       Physical Exam  Constitutional:       General: He is not in acute distress.     Appearance: Normal appearance.   HENT:      Head: Normocephalic and atraumatic.   Cardiovascular:      Rate and Rhythm: Normal rate.   Pulmonary:      Effort: Pulmonary effort is normal. No respiratory distress.   Abdominal:      General: There is no distension.      Comments: Midline abdominal incision C/D/I  L. Groin incision C/D/I.   Skin:     General: Skin is warm and dry.      Findings: No lesion.   Neurological:      Mental  Status: He is alert. Mental status is at baseline.          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     Notify your health care provider if you experience any of the following:  temperature >100.4     Notify your health care provider if you experience any of the following:  persistent nausea and vomiting or diarrhea     Notify your health care provider if you experience any of the following:  severe uncontrolled pain     Notify your health care provider if you experience any of the following:  redness, tenderness, or signs of infection (pain, swelling, redness, odor or green/yellow discharge around incision site)     Notify your health care provider if you experience any of the following:  difficulty breathing or increased cough     Notify your health care provider if you experience any of the following:  severe persistent headache     Notify your health care provider if you experience any of the following:  worsening rash     Notify your health care provider if you experience any of the following:  persistent dizziness, light-headedness, or visual disturbances     Notify your health care provider if you experience any of the following:  increased confusion or weakness     Change dressing (specify)   Order Comments: See patient instructions.     Medications:  Reconciled Home Medications:      Medication List        START taking these medications      cyclobenzaprine 5 MG tablet  Commonly known as: FLEXERIL  Take 1 tablet (5 mg total) by mouth 3 (three) times daily as needed for Muscle spasms.     HYDROmorphone 4 MG tablet  Commonly known as:  DILAUDID  Take 0.5 tablets (2 mg total) by mouth every 4 (four) hours as needed for Pain (take half (0.5) tablet as needed for pain. 2mg per dose.).            CONTINUE taking these medications      acetaminophen 325 MG tablet  Commonly known as: TYLENOL  [place] 2 tabletas (650 mg en total) por vía oral cada 6 (seis) horas según sea necesario para el dolor.  (Take 2 tablets (650 mg total) by mouth every 6 (six) hours as needed for Pain.)     adalimumab-adbm 40 mg/0.8 mL Pnkt  Commonly known as: CYLTEZO(CF) PEN  Inject 1 pen  (40 mg total) into the skin every 14 (fourteen) days.     amLODIPine 5 MG tablet  Commonly known as: NORVASC  Take 5 mg by mouth once daily.     ammonium lactate 12 % lotion  Commonly known as: LAC-HYDRIN  Apply topically 2 (two) times daily.     aspirin 81 MG EC tablet  Commonly known as: ECOTRIN  Take 81 mg by mouth once daily.     COREG 6.25 mg tablet  Generic drug: carvediloL  Take 6.25 mg by mouth 2 (two) times daily.     docusate sodium 100 MG capsule  Commonly known as: COLACE  Take 1 capsule (100 mg total) by mouth 2 (two) times daily as needed for Constipation (please take while taking opioid pain medication).     HYDROcodone-acetaminophen 5-325 mg per tablet  Commonly known as: NORCO  [place] melissa tableta por vía oral cada 4 horas según sea necesario para el dolor.  (Take 1 tablet by mouth every 4 (four) hours as needed for Pain.)     lisinopriL 40 MG tablet  Commonly known as: PRINIVIL,ZESTRIL  TAKE 1 TABLET BY MOUTH ONCE DAILY IN THE EVENING     naproxen 500 MG tablet  Commonly known as: NAPROSYN  TAKE 1 TABLET BY MOUTH EVERY 12 HOURS WITH FOOD OR MILK AS NEEDED for 30     TRELEGY ELLIPTA 200-62.5-25 mcg inhaler  Generic drug: fluticasone-umeclidin-vilanter  INHALE 1 PUFF ONCE DAILY     * albuterol 90 mcg/actuation inhaler  Commonly known as: PROVENTIL/VENTOLIN HFA  Inhale 2 puffs into the lungs every 6 (six) hours as needed for Wheezing. Rescue     * VENTOLIN HFA 90 mcg/actuation  inhaler  Generic drug: albuterol  INHALE 2 PUFFS BY MOUTH EVERY 6 HOURS AS NEEDED FOR WHEEZING     XARELTO 20 mg Tab  Generic drug: rivaroxaban  [place] melissa tableta (20 mg en total) por vía oral diariamente.  (Take 1 tablet (20 mg total) by mouth once daily.)           * This list has 2 medication(s) that are the same as other medications prescribed for you. Read the directions carefully, and ask your doctor or other care provider to review them with you.                ASK your doctor about these medications      DUPIXENT  mg/2 mL Pnij  Generic drug: dupilumab  Inject 2 mLs (300 mg total) into the skin every 14 (fourteen) days.              Sky Tavares MD  Vascular Surgery  [facility] - Inpatient General Surgery

## 2025-03-07 NOTE — HOSPITAL COURSE
Mr. Gilliam is a 69 y.o M who was admitted on 3/05/2025 in order to undergo L. Femoral artery aneurysm repair. He was taken back to the OR the same day and the operation was performed with no apparent complications. Patient was extubated in the OR and transferred to PACU in stable condition and then to the PCU. While inpatient he has remained AF/HDS, laboratory workup has been within normal limits. His pain is controlled, he is tolerating a diet, and passing flatus. He is now medically appropriate for discharge home. He will follow up in clinic in 2 weeks.

## 2025-03-07 NOTE — PLAN OF CARE
Pt discharged home with family.  Discharge instructions given with teach back. PIVs removed.  Wife picked up meds at pharmacy.  I took pt down in wheelchair.

## 2025-03-07 NOTE — NURSING
PCU Plan of Care     Patient Dx: <principal problem not specified>     Vital Signs (last 12 hours):   Temp:  [97.5 °F (36.4°C)-99 °F (37.2 °C)]   Pulse:  [66-76]   Resp:  [16-30]   BP: (117-141)/(65-76)   SpO2:  [94 %-98 %]       Neuro: AAOx4 and Follows Commands     Cardiac: normal sinus rhythm     Respiratory: Room Air     Gtts: none     Frequent Checks: Neurovascular Checks q4h     Urine Output: Voids Spontaneously  400 mL/shift     Drains: none     Diet: Diabetic      Mobility: Up to Chair ; Assistance Required: 1-person Assistance      Skin:  see LDA for lines, drains and incisions.  Patient turned q2h, bony prominences protected, and mattress inflated/working correctly.   Waldemar Score: 17.     Shift Events:  See flowsheet for further assessment/details.  Family updated on current condition/plan of care, questions answered, and emotional support provided.  MD updated on current condition, vitals, labs, and gtts.

## 2025-03-07 NOTE — PLAN OF CARE
[facility] - Inpatient General [facility]  Discharge Final Note    Primary Care Provider: Cory Che MD    Expected Discharge Date: 3/7/2025    Future Appointments   Date Time Provider Department Center   3/10/2025  9:45 AM MOI Childers II, MD [facility] [address]   8/4/2025  2:40 PM Carlos Wang MD [facility] [address]         Final Discharge Note (most recent)       Final Note - 03/07/25 1558          Final Note    Assessment Type Final Discharge Note     Anticipated Discharge Disposition Home or Self Care     What phone number can be called within the next 1-3 days to see how you are doing after discharge? [phone]        Post-Acute Status    Coverage MEDICARE - MEDICARE PART A & B     Discharge Delays None known at this time                 Pt discharging today home w/ NN.  Brenda # [id number] was used. Pt wife informed their son- Vishnu will be providing pt transportation home. SW completed IMM; pt and wife agree. Pt signed IMM and SW placed IMM in pt chart by room on floor.     MARIXA Blair   [facility]    Case Management Dept  [phone]      Important Message from Medicare  Important Message from Medicare regarding Discharge Appeal Rights: Given to patient/caregiver, Signed/date by patient/caregiver     Date IMM was signed: 03/07/25  Time IMM was signed: 1428    Contact Cory Pompa MD   Specialty: Internal Medicine   Relationship: PCP - General    [address] [place] [address] [facility]  [place] [address]   Phone: [phone]       Next Steps: Follow up    Dr Cory Che    [facility] [address]  [place] [address] [phone]       Next Steps: Follow up on 3/14/2025    Instructions: Hospital follow up @ 9:00 am. Please bring discharge summary, ID, insurance card, and medication list.

## 2025-03-07 NOTE — SUBJECTIVE & OBJECTIVE
Prescriptions Prior to Admission[1]    Review of patient's allergies indicates:   Allergen Reactions    Influenza virus vaccines Other (See Comments)       Past Medical History:   Diagnosis Date    Asthma     CVA (cerebral vascular accident)     Hypertension      Past Surgical History:   Procedure Laterality Date    ANGIOGRAPHY Right 2024    Procedure: ANGIOGRAM - right lower extremity;  Surgeon: MOI Childers II, MD;  Location: [facility] OR [address] [place] [address];  Service: Vascular;  Laterality: Right;    COLONOSCOPY N/A 2019    Procedure: COLONOSCOPY;  Surgeon: Luis Bogran-Reyes, MD;  Location: [facility];  Service: Endoscopy;  Laterality: N/A;    CREATION OF FEMOROPOPLITEAL ARTERIAL BYPASS USING GRAFT Right 2024    Procedure: CREATION, BYPASS, ARTERIAL, FEMORAL TO POPLITEAL, USING GRAFT;  Surgeon: MOI Childers II, MD;  Location: [address];  Service: Vascular;  Laterality: Right;    CREATION OF FEMOROPOPLITEAL ARTERIAL BYPASS USING GRAFT Left 10/31/2024    Procedure: CREATION, BYPASS, ARTERIAL, FEMORAL TO POPLITEAL, USING GRAFT;  Surgeon: MOI Childers II, MD;  Location: [facility] OR [address] [place] [address];  Service: Vascular;  Laterality: Left;    HEMORRHOID SURGERY      NOSE SURGERY      REPAIR OF ANEURYSM OF FEMORAL ARTERY Left 3/5/2025    Procedure: REPAIR, ANEURYSM, ARTERY, FEMORAL;  Surgeon: MOI Childers II, MD;  Location: [address];  Service: Vascular;  Laterality: Left;    TONSILLECTOMY       Family History       Problem Relation (Age of Onset)    Asthma Mother    Heart disease Mother    Hypertension Sister, Sister    No Known Problems Father          Tobacco Use    Smoking status: Former     Current packs/day: 0.00     Types: Cigarettes, Cigars     Quit date:      Years since quittin.2    Smokeless tobacco: Never   Substance and Sexual Activity    Alcohol use: Yes     Alcohol/week: 6.0 standard drinks of alcohol     Types: 6 Cans of beer per week    Drug use: Never    Sexual activity:  Not Currently     Review of Systems  Objective:     Vital Signs (Most Recent):  Temp: 98.4 °F (36.9 °C) (03/07/25 0800)  Pulse: 66 (03/07/25 1148)  Resp: (!) 24 (03/07/25 1000)  BP: (!) 98/53 (03/07/25 1000)  SpO2: (!) 92 % (03/07/25 1148) Vital Signs (24h Range):  Temp:  [98.1 °F (36.7 °C)-98.8 °F (37.1 °C)] 98.4 °F (36.9 °C)  Pulse:  [65-81] 66  Resp:  [12-30] 24  SpO2:  [90 %-98 %] 92 %  BP: ()/(53-74) 98/53     Weight: 90.3 kg (199 lb 1.2 oz)  Body mass index is 29.4 kg/m².      Physical Exam     Significant Labs:  {Results:16173}    Significant Diagnostics:  {Imaging Review:93885}       [1]   Medications Prior to Admission   Medication Sig Dispense Refill Last Dose/Taking    amLODIPine (NORVASC) 5 MG tablet Take 5 mg by mouth once daily.   3/4/2025    lisinopriL (PRINIVIL,ZESTRIL) 40 MG tablet TAKE 1 TABLET BY MOUTH ONCE DAILY IN THE EVENING 90 tablet 0 3/4/2025    VENTOLIN HFA 90 mcg/actuation inhaler INHALE 2 PUFFS BY MOUTH EVERY 6 HOURS AS NEEDED FOR WHEEZING 18 g 0 3/4/2025    acetaminophen (TYLENOL) 325 MG tablet Take 2 tablets (650 mg total) by mouth every 6 (six) hours as needed for Pain. 30 tablet 0     adalimumab-adbm (CYLTEZO,CF, PEN) 40 mg/0.8 mL PnKt Inject 1 pen  (40 mg total) into the skin every 14 (fourteen) days. 2 pen 11 2/27/2025    albuterol (PROVENTIL/VENTOLIN HFA) 90 mcg/actuation inhaler Inhale 2 puffs into the lungs every 6 (six) hours as needed for Wheezing. Rescue   More than a month    ammonium lactate (LAC-HYDRIN) 12 % lotion Apply topically 2 (two) times daily. 1 each 10 More than a month    aspirin (ECOTRIN) 81 MG EC tablet Take 81 mg by mouth once daily.       COREG 6.25 mg tablet Take 6.25 mg by mouth 2 (two) times daily.   3/2/2025    docusate sodium (COLACE) 100 MG capsule Take 1 capsule (100 mg total) by mouth 2 (two) times daily as needed for Constipation (please take while taking opioid pain medication). 30 capsule 0 More than a month    dupilumab (DUPIXENT PEN) 300  mg/2 mL PnIj Inject 2 mLs (300 mg total) into the skin every 14 (fourteen) days. (Patient not taking: Reported on 2/28/2025) 4 mL 11 Not Taking    HYDROcodone-acetaminophen (NORCO) 5-325 mg per tablet Take 1 tablet by mouth every 4 (four) hours as needed for Pain. 28 tablet 0     naproxen (NAPROSYN) 500 MG tablet TAKE 1 TABLET BY MOUTH EVERY 12 HOURS WITH FOOD OR MILK AS NEEDED for 30       TRELEGY ELLIPTA 200-62.5-25 mcg inhaler INHALE 1 PUFF ONCE DAILY   3/3/2025    XARELTO 20 mg Tab Take 1 tablet (20 mg total) by mouth once daily. 60 tablet 0 3/2/2025

## 2025-03-09 LAB
BLD PROD TYP BPU: NORMAL
BLD PROD TYP BPU: NORMAL
BLOOD UNIT EXPIRATION DATE: NORMAL
BLOOD UNIT EXPIRATION DATE: NORMAL
BLOOD UNIT TYPE CODE: 6200
BLOOD UNIT TYPE CODE: 6200
BLOOD UNIT TYPE: NORMAL
BLOOD UNIT TYPE: NORMAL
CODING SYSTEM: NORMAL
CODING SYSTEM: NORMAL
CROSSMATCH INTERPRETATION: NORMAL
CROSSMATCH INTERPRETATION: NORMAL
DISPENSE STATUS: NORMAL
DISPENSE STATUS: NORMAL
TRANS ERYTHROCYTES VOL PATIENT: NORMAL ML
TRANS ERYTHROCYTES VOL PATIENT: NORMAL ML

## 2025-03-20 ENCOUNTER — OFFICE VISIT (OUTPATIENT)
Dept: VASCULAR SURGERY | Facility: CLINIC | Age: 70
End: 2025-03-20
Attending: SURGERY
Payer: MEDICARE

## 2025-03-20 VITALS
SYSTOLIC BLOOD PRESSURE: 112 MMHG | DIASTOLIC BLOOD PRESSURE: 70 MMHG | WEIGHT: 195 LBS | HEIGHT: 69 IN | HEART RATE: 71 BPM | BODY MASS INDEX: 28.88 KG/M2 | TEMPERATURE: 98 F

## 2025-03-20 DIAGNOSIS — I72.4 FEMORAL ARTERY ANEURYSM, BILATERAL: Primary | ICD-10-CM

## 2025-03-20 PROCEDURE — 99999 PR PBB SHADOW E&M-EST. PATIENT-LVL III: CPT | Mod: PBBFAC,,, | Performed by: SURGERY

## 2025-03-20 PROCEDURE — 99024 POSTOP FOLLOW-UP VISIT: CPT | Mod: POP,,, | Performed by: SURGERY

## 2025-03-20 PROCEDURE — 99213 OFFICE O/P EST LOW 20 MIN: CPT | Mod: PBBFAC | Performed by: SURGERY

## 2025-03-20 NOTE — PROGRESS NOTES
VASCULAR SURGERY NOTE    Patient ID: Tan Gilliam is a 69 y.o. male.    I. HISTORY     Chief Complaint: Femoral artery aneurysms     HPI: Tan Gilliam is a 69 y.o. 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 He discovered the aneurysms from CT imaging in the ER 3/16/24 for abdominal pain which was diagnosed diverticulitis.  Patient returns for clinic visit s/p right SFA to below-knee popliteal artery bypass with PTFE in May 2024 and left SFA to below knee popliteal artery bypass with PTFE on 10/31/24. Patient says that he is feeling better since his last clinic appointment.  He has no pain at his lower extremity anymore.  He has been walking regularly and does not have major limitations.  He does complain of some hypersensitivity over his left medial leg.  He avoids wearing pants and tries to wear mostly shorts because he says that it is sensitive to touch.  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  He does also complain of some persistent swelling in the left lower extremity.    2/6/25: Pt presents with his son who translates during the encounter. Pt states that his previously described BLE neuropathic pain has subsided, relief of BLE hypersensitivity. Pt endorses new mild tenderness noted within the last 2 weeks to the posterior left leg and thigh only with flexion of LLE, no pain on palpation. BLE 1+ non pitting edema presents similarly to previous. Pt feels he has recovered well from previous bypass 10/31/24. Pt feels he would be ready and willing to undergo the previously discussed b/l femoral bypass 2 weeks from now. Pt denies any other new complaints BLE.    Interval history 3/20/2025:  Mr. Gilliam returns for 2 week post op follow up and suture removal after undergoing L. Sided femoral artery aneurysm repair with Left  external iliac artery to profunda bypass with 10 mm PTFE  and Common femoral artery to SFA artery bypass with 10 mm PTFE. Patient is doing well, pain is controlled, and he is independently ambulating at home but does require assistance with long distances. Groin incision site with minor area of maceration/ wound breakdown but no dehiscence. No additional concerns or questions at this time.    ALLERGIES: Flu vaccine     SOCIAL HISTORY: previous smoker (quit 1991, several packs/day for 25 years)    FAMILY HISTORY:  Brother recently diagnosed with AAA    MEDICATIONS: Xarelto (prescribed for aneurysm), aspirin, coreg, amlodipine, lisiniopril, Humira pen         Past Medical History:   Diagnosis Date    Asthma     CVA (cerebral vascular accident)     Hypertension         Past Surgical History:   Procedure Laterality Date    ANGIOGRAPHY Right 5/21/2024    Procedure: ANGIOGRAM - right lower extremity;  Surgeon: MOI Childers II, MD;  Location: [facility] OR [address] [place] [address];  Service: Vascular;  Laterality: Right;    COLONOSCOPY N/A 4/25/2019    Procedure: COLONOSCOPY;  Surgeon: Luis Bogran-Reyes, MD;  Location: [facility];  Service: Endoscopy;  Laterality: N/A;    CREATION OF FEMOROPOPLITEAL ARTERIAL BYPASS USING GRAFT Right 5/21/2024    Procedure: CREATION, BYPASS, ARTERIAL, FEMORAL TO POPLITEAL, USING GRAFT;  Surgeon: MOI Childers II, MD;  Location: [address];  Service: Vascular;  Laterality: Right;    CREATION OF FEMOROPOPLITEAL ARTERIAL BYPASS USING GRAFT Left 10/31/2024    Procedure: CREATION, BYPASS, ARTERIAL, FEMORAL TO POPLITEAL, USING GRAFT;  Surgeon: MOI Childers II, MD;  Location: [facility] OR [address] [place] [address];  Service: Vascular;  Laterality: Left;    HEMORRHOID SURGERY      NOSE SURGERY      REPAIR OF ANEURYSM OF FEMORAL ARTERY Left 3/5/2025    Procedure: REPAIR, ANEURYSM, ARTERY, FEMORAL;  Surgeon: MOI Childers II, MD;  Location: [facility] OR [address] [place] [address];  Service: Vascular;  Laterality: Left;    TONSILLECTOMY          Social History     Occupational History    Not on file   Tobacco Use    Smoking status: Former     Current packs/day: 0.00     Types: Cigarettes, Cigars     Quit date:      Years since quittin.2    Smokeless tobacco: Never   Substance and Sexual Activity    Alcohol use: Yes     Alcohol/week: 6.0 standard drinks of alcohol     Types: 6 Cans of beer per week    Drug use: Never    Sexual activity: Not Currently         Review of Systems   Constitutional: Negative for weight loss.   HENT:  Negative for ear pain and nosebleeds.    Eyes:  Negative for discharge and pain.   Cardiovascular:  Negative for chest pain and palpitations.   Respiratory:  Negative for cough, shortness of breath and wheezing.    Endocrine: Negative for cold intolerance, heat intolerance and polyphagia.   Hematologic/Lymphatic: Negative for adenopathy. Does not bruise/bleed easily.   Skin:  Negative for itching and rash.   Musculoskeletal:  Negative for joint swelling and muscle cramps.   Gastrointestinal:  Negative for abdominal pain, diarrhea, nausea and vomiting.   Genitourinary:  Negative for dysuria and flank pain.   Neurological:  Negative for numbness and seizures.         II. PHYSICAL EXAM     Physical Exam  Constitutional:       Appearance: Normal appearance. He is not ill-appearing or diaphoretic.   HENT:      Head: Normocephalic and atraumatic.   Eyes:      General: No scleral icterus.        Right eye: No discharge.         Left eye: No discharge.      Extraocular Movements: Extraocular movements intact.      Conjunctiva/sclera: Conjunctivae normal.   Cardiovascular:      Rate and Rhythm: Normal rate and regular rhythm.   Pulmonary:      Effort: Pulmonary effort is normal. No respiratory distress.   Musculoskeletal:         General: Normal range of motion.      Cervical back: Normal range of motion and neck supple.   Skin:     General: Skin is warm and dry.      Comments: L. Sided abdominal and groin incision intact with  minor area of skin edge maceration/ischemia, this was debrided in clinic and removed.    Neurological:      General: No focal deficit present.      Mental Status: He is alert and oriented to person, place, and time.   Psychiatric:         Mood and Affect: Mood normal.         Behavior: Behavior normal.       VASC:  RLE: 4+ palpable femoral pulse, aneurysm palpable, absent popliteal pulse; 2+ DP   LLE: 2+ palpable femoral pulse, absent popliteal pulse, 2+ DP     III. ASSESSMENT & PLAN (MEDICAL DECISION MAKING)       Imaging Results: (I have personally reviewed all images and provided interpretation below)  No new imaging-- prior CTA from April 2024 reviewed for planning of femoral aneurysm repair      Assessment/Diagnosis and Plan:    1. Femoral artery aneurysm, bilateral            69 y.o. 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  He has had successful repair of his bilateral popliteal artery aneurysms. His incision is healing well and he has very few complaints.  He would like to delay repair of his right femoral aneurysm until the summer. Sutures/staples removed from his incisions in clinic.    -continue asa 81mg daily  -Continue home Xarelto for right femoral aneurysm patency  - RTC June 2025 to discuss plan for R. Femoral artery aneurysm repair   - Can RTC sooner if needed.  - daily wound care, instructions and supplies given to patient and son. Demonstrated understanding.    Sky Tavares MD  General Surgery, PGY-1  [facility]

## 2025-06-30 ENCOUNTER — HOSPITAL ENCOUNTER (OUTPATIENT)
Dept: CARDIOLOGY | Facility: CLINIC | Age: 70
Discharge: HOME | End: 2025-06-30
Attending: SURGERY
Payer: MEDICARE

## 2025-06-30 ENCOUNTER — HOSPITAL ENCOUNTER (OUTPATIENT)
Dept: RADIOLOGY | Facility: HOSPITAL | Age: 70
Discharge: HOME | End: 2025-06-30
Attending: SURGERY
Payer: MEDICARE

## 2025-06-30 ENCOUNTER — OFFICE VISIT (OUTPATIENT)
Dept: VASCULAR SURGERY | Facility: CLINIC | Age: 70
End: 2025-06-30
Attending: SURGERY
Payer: MEDICARE

## 2025-06-30 VITALS
TEMPERATURE: 98 F | SYSTOLIC BLOOD PRESSURE: 137 MMHG | DIASTOLIC BLOOD PRESSURE: 81 MMHG | HEART RATE: 73 BPM | WEIGHT: 194 LBS | HEIGHT: 69 IN | BODY MASS INDEX: 28.73 KG/M2

## 2025-06-30 DIAGNOSIS — Z01.818 PRE-OP EVALUATION: ICD-10-CM

## 2025-06-30 DIAGNOSIS — I72.4 FEMORAL ARTERY ANEURYSM, RIGHT: Primary | ICD-10-CM

## 2025-06-30 DIAGNOSIS — I72.4 FEMORAL ARTERY ANEURYSM: Primary | ICD-10-CM

## 2025-06-30 LAB
OHS QRS DURATION: 92 MS
OHS QTC CALCULATION: 420 MS

## 2025-06-30 PROCEDURE — 71046 X-RAY EXAM CHEST 2 VIEWS: CPT | Mod: TC

## 2025-06-30 PROCEDURE — 93010 ELECTROCARDIOGRAM REPORT: CPT | Mod: S$PBB,,, | Performed by: STUDENT IN AN ORGANIZED HEALTH CARE EDUCATION/TRAINING PROGRAM

## 2025-06-30 PROCEDURE — 93005 ELECTROCARDIOGRAM TRACING: CPT | Mod: PBBFAC | Performed by: STUDENT IN AN ORGANIZED HEALTH CARE EDUCATION/TRAINING PROGRAM

## 2025-06-30 PROCEDURE — 99213 OFFICE O/P EST LOW 20 MIN: CPT | Mod: PBBFAC,25 | Performed by: SURGERY

## 2025-06-30 PROCEDURE — 71046 X-RAY EXAM CHEST 2 VIEWS: CPT | Mod: 26,,, | Performed by: RADIOLOGY

## 2025-06-30 PROCEDURE — 99999 PR PBB SHADOW E&M-EST. PATIENT-LVL III: CPT | Mod: PBBFAC,,, | Performed by: SURGERY

## 2025-06-30 PROCEDURE — 99214 OFFICE O/P EST MOD 30 MIN: CPT | Mod: S$PBB,,, | Performed by: SURGERY

## 2025-07-01 NOTE — PROGRESS NOTES
VASCULAR SURGERY NOTE    Patient ID: Tan Gilliam is a 70 y.o. male.    I. HISTORY     Chief Complaint: Femoral artery aneurysms     HPI: Tan Gilliam is a 70 y.o. 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 He discovered the aneurysms from CT imaging in the ER 3/16/24 for abdominal pain which was diagnosed diverticulitis.  Patient returns for clinic visit s/p right SFA to below-knee popliteal artery bypass with PTFE in May 2024 and left SFA to below knee popliteal artery bypass with PTFE on 10/31/24. Patient says that he is feeling better since his last clinic appointment.  He has no pain at his lower extremity anymore.  He has been walking regularly and does not have major limitations.  He does complain of some hypersensitivity over his left medial leg.  He avoids wearing pants and tries to wear mostly shorts because he says that it is sensitive to touch.  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  He does also complain of some persistent swelling in the left lower extremity.    HPI 2/6/25: Pt presents with his son who translates during the encounter. Pt states that his previously described BLE neuropathic pain has subsided, relief of BLE hypersensitivity. Pt endorses new mild tenderness noted within the last 2 weeks to the posterior left leg and thigh only with flexion of LLE, no pain on palpation. BLE 1+ non pitting edema presents similarly to previous. Pt feels he has recovered well from previous bypass 10/31/24. Pt feels he would be ready and willing to undergo the previously discussed b/l femoral bypass 2 weeks from now. Pt denies any other new complaints BLE.    Interval history 3/20/2025:  Mr. Gilliam returns for 2 week post op follow up and suture removal after undergoing L. Sided femoral artery aneurysm repair with Left  external iliac artery to profunda bypass with 10 mm PTFE  and Common femoral artery to SFA artery bypass with 10 mm PTFE. Patient is doing well, pain is controlled, and he is independently ambulating at home but does require assistance with long distances. Groin incision site with minor area of maceration/ wound breakdown but no dehiscence. No additional concerns or questions at this time.      THE ENTIRE EXAM WAS PERFORMED WITH AN   HPI 6/30/25:  69yo M s/p above surgeries doing well. Still has right common femoral artery aneurysm. Has fully recovered from all of his other surgeries. Has some residual numbness on medial left leg around surgical incisions. Overall he feels well and can walk as far as he wants without pain. He has returned to regular activities like fishing and has no complaints.    ALLERGIES: Flu vaccine     SOCIAL HISTORY: previous smoker (quit 1991, several packs/day for 25 years)    FAMILY HISTORY:  Brother recently diagnosed with AAA    MEDICATIONS: Xarelto (prescribed for aneurysm), aspirin, coreg, amlodipine, lisiniopril, Humira pen         Past Medical History:   Diagnosis Date    Asthma     CVA (cerebral vascular accident)     Hypertension         Past Surgical History:   Procedure Laterality Date    ANGIOGRAPHY Right 5/21/2024    Procedure: ANGIOGRAM - right lower extremity;  Surgeon: MOI Childers II, MD;  Location: [facility] OR [address] [place] [address];  Service: Vascular;  Laterality: Right;    COLONOSCOPY N/A 4/25/2019    Procedure: COLONOSCOPY;  Surgeon: Luis Bogran-Reyes, MD;  Location: [facility];  Service: Endoscopy;  Laterality: N/A;    CREATION OF FEMOROPOPLITEAL ARTERIAL BYPASS USING GRAFT Right 5/21/2024    Procedure: CREATION, BYPASS, ARTERIAL, FEMORAL TO POPLITEAL, USING GRAFT;  Surgeon: MOI Childers II, MD;  Location: [facility] OR [address] [place] [address];  Service: Vascular;  Laterality: Right;    CREATION OF FEMOROPOPLITEAL ARTERIAL BYPASS USING GRAFT Left 10/31/2024    Procedure: CREATION,  BYPASS, ARTERIAL, FEMORAL TO POPLITEAL, USING GRAFT;  Surgeon: MOI Childers II, MD;  Location: [facility] OR [address] [place] [address];  Service: Vascular;  Laterality: Left;    HEMORRHOID SURGERY      NOSE SURGERY      REPAIR OF ANEURYSM OF FEMORAL ARTERY Left 3/5/2025    Procedure: REPAIR, ANEURYSM, ARTERY, FEMORAL;  Surgeon: MOI Childers II, MD;  Location: [facility] OR [address] [place] [address];  Service: Vascular;  Laterality: Left;    TONSILLECTOMY         Social History     Occupational History    Not on file   Tobacco Use    Smoking status: Former     Current packs/day: 0.00     Types: Cigarettes, Cigars     Quit date:      Years since quittin.5    Smokeless tobacco: Never   Substance and Sexual Activity    Alcohol use: Yes     Alcohol/week: 6.0 standard drinks of alcohol     Types: 6 Cans of beer per week    Drug use: Never    Sexual activity: Not Currently         Review of Systems   Constitutional: Negative for weight loss.   HENT:  Negative for ear pain and nosebleeds.    Eyes:  Negative for discharge and pain.   Cardiovascular:  Negative for chest pain and palpitations.   Respiratory:  Negative for cough, shortness of breath and wheezing.    Endocrine: Negative for cold intolerance, heat intolerance and polyphagia.   Hematologic/Lymphatic: Negative for adenopathy. Does not bruise/bleed easily.   Skin:  Negative for itching and rash.   Musculoskeletal:  Negative for joint swelling and muscle cramps.   Gastrointestinal:  Negative for abdominal pain, diarrhea, nausea and vomiting.   Genitourinary:  Negative for dysuria and flank pain.   Neurological:  Negative for numbness and seizures.         II. PHYSICAL EXAM     Physical Exam  Constitutional:       Appearance: Normal appearance. He is not ill-appearing or diaphoretic.   HENT:      Head: Normocephalic and atraumatic.   Eyes:      General: No scleral icterus.        Right eye: No discharge.         Left eye: No discharge.      Extraocular Movements: Extraocular movements intact.       Conjunctiva/sclera: Conjunctivae normal.   Cardiovascular:      Rate and Rhythm: Normal rate and regular rhythm.   Pulmonary:      Effort: Pulmonary effort is normal. No respiratory distress.   Musculoskeletal:         General: Normal range of motion.      Cervical back: Normal range of motion and neck supple.   Skin:     General: Skin is warm and dry.      Comments: L. Sided abdominal and groin scar well healed, all lower extremity scars well healed   Neurological:      General: No focal deficit present.      Mental Status: He is alert and oriented to person, place, and time.   Psychiatric:         Mood and Affect: Mood normal.         Behavior: Behavior normal.       VASC:  RLE: 4+ palpable femoral pulse, aneurysm palpable, absent popliteal pulse; 2+ DP   LLE: 2+ palpable femoral pulse, absent popliteal pulse, 2+ DP     III. ASSESSMENT & PLAN (MEDICAL DECISION MAKING)       Imaging Results: (I have personally reviewed all images and provided interpretation below)  No new imaging-- prior CTA from April 2024 reviewed for planning of femoral aneurysm repair      Assessment/Diagnosis and Plan:    1. Femoral artery aneurysm, right        70 y.o. 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  He has had successful repair of his bilateral popliteal artery aneurysms and left common femoral artery aneurysm. His incision is healing well and he has very few complaints.  He would like to move forward with repair of his right femoral aneurysm soon. WE discussed risks, benefits, and alternatives and patient expressed understanding and agreed to proceed.    -continue asa 81mg daily  -Continue home Xarelto for Afib-- hold 48 hours pre-op  - Plan for open RIGHT common Femoral artery aneurysm repair     MOI Childers II, MD, [facility] [place]  Vascular Surgery  [facility]

## 2025-07-01 NOTE — H&P (VIEW-ONLY)
VASCULAR SURGERY NOTE    Patient ID: Tan Gilliam is a 70 y.o. male.    I. HISTORY     Chief Complaint: Femoral artery aneurysms     HPI: Tan Gilliam is a 70 y.o. 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 He discovered the aneurysms from CT imaging in the ER 3/16/24 for abdominal pain which was diagnosed diverticulitis.  Patient returns for clinic visit s/p right SFA to below-knee popliteal artery bypass with PTFE in May 2024 and left SFA to below knee popliteal artery bypass with PTFE on 10/31/24. Patient says that he is feeling better since his last clinic appointment.  He has no pain at his lower extremity anymore.  He has been walking regularly and does not have major limitations.  He does complain of some hypersensitivity over his left medial leg.  He avoids wearing pants and tries to wear mostly shorts because he says that it is sensitive to touch.  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  He does also complain of some persistent swelling in the left lower extremity.    HPI 2/6/25: Pt presents with his son who translates during the encounter. Pt states that his previously described BLE neuropathic pain has subsided, relief of BLE hypersensitivity. Pt endorses new mild tenderness noted within the last 2 weeks to the posterior left leg and thigh only with flexion of LLE, no pain on palpation. BLE 1+ non pitting edema presents similarly to previous. Pt feels he has recovered well from previous bypass 10/31/24. Pt feels he would be ready and willing to undergo the previously discussed b/l femoral bypass 2 weeks from now. Pt denies any other new complaints BLE.    Interval history 3/20/2025:  Mr. Gilliam returns for 2 week post op follow up and suture removal after undergoing L. Sided femoral artery aneurysm repair with Left  external iliac artery to profunda bypass with 10 mm PTFE  and Common femoral artery to SFA artery bypass with 10 mm PTFE. Patient is doing well, pain is controlled, and he is independently ambulating at home but does require assistance with long distances. Groin incision site with minor area of maceration/ wound breakdown but no dehiscence. No additional concerns or questions at this time.      THE ENTIRE EXAM WAS PERFORMED WITH AN   HPI 6/30/25:  71yo M s/p above surgeries doing well. Still has right common femoral artery aneurysm. Has fully recovered from all of his other surgeries. Has some residual numbness on medial left leg around surgical incisions. Overall he feels well and can walk as far as he wants without pain. He has returned to regular activities like fishing and has no complaints.    ALLERGIES: Flu vaccine     SOCIAL HISTORY: previous smoker (quit 1991, several packs/day for 25 years)    FAMILY HISTORY:  Brother recently diagnosed with AAA    MEDICATIONS: Xarelto (prescribed for aneurysm), aspirin, coreg, amlodipine, lisiniopril, Humira pen         Past Medical History:   Diagnosis Date    Asthma     CVA (cerebral vascular accident)     Hypertension         Past Surgical History:   Procedure Laterality Date    ANGIOGRAPHY Right 5/21/2024    Procedure: ANGIOGRAM - right lower extremity;  Surgeon: MOI Childers II, MD;  Location: [facility] OR [address] [place] [address];  Service: Vascular;  Laterality: Right;    COLONOSCOPY N/A 4/25/2019    Procedure: COLONOSCOPY;  Surgeon: Luis Bogran-Reyes, MD;  Location: [facility];  Service: Endoscopy;  Laterality: N/A;    CREATION OF FEMOROPOPLITEAL ARTERIAL BYPASS USING GRAFT Right 5/21/2024    Procedure: CREATION, BYPASS, ARTERIAL, FEMORAL TO POPLITEAL, USING GRAFT;  Surgeon: MOI Childers II, MD;  Location: [facility] OR [address] [place] [address];  Service: Vascular;  Laterality: Right;    CREATION OF FEMOROPOPLITEAL ARTERIAL BYPASS USING GRAFT Left 10/31/2024    Procedure: CREATION,  BYPASS, ARTERIAL, FEMORAL TO POPLITEAL, USING GRAFT;  Surgeon: MOI Childers II, MD;  Location: [facility] OR [address] [place] [address];  Service: Vascular;  Laterality: Left;    HEMORRHOID SURGERY      NOSE SURGERY      REPAIR OF ANEURYSM OF FEMORAL ARTERY Left 3/5/2025    Procedure: REPAIR, ANEURYSM, ARTERY, FEMORAL;  Surgeon: MOI Childers II, MD;  Location: [facility] OR [address] [place] [address];  Service: Vascular;  Laterality: Left;    TONSILLECTOMY         Social History     Occupational History    Not on file   Tobacco Use    Smoking status: Former     Current packs/day: 0.00     Types: Cigarettes, Cigars     Quit date:      Years since quittin.5    Smokeless tobacco: Never   Substance and Sexual Activity    Alcohol use: Yes     Alcohol/week: 6.0 standard drinks of alcohol     Types: 6 Cans of beer per week    Drug use: Never    Sexual activity: Not Currently         Review of Systems   Constitutional: Negative for weight loss.   HENT:  Negative for ear pain and nosebleeds.    Eyes:  Negative for discharge and pain.   Cardiovascular:  Negative for chest pain and palpitations.   Respiratory:  Negative for cough, shortness of breath and wheezing.    Endocrine: Negative for cold intolerance, heat intolerance and polyphagia.   Hematologic/Lymphatic: Negative for adenopathy. Does not bruise/bleed easily.   Skin:  Negative for itching and rash.   Musculoskeletal:  Negative for joint swelling and muscle cramps.   Gastrointestinal:  Negative for abdominal pain, diarrhea, nausea and vomiting.   Genitourinary:  Negative for dysuria and flank pain.   Neurological:  Negative for numbness and seizures.         II. PHYSICAL EXAM     Physical Exam  Constitutional:       Appearance: Normal appearance. He is not ill-appearing or diaphoretic.   HENT:      Head: Normocephalic and atraumatic.   Eyes:      General: No scleral icterus.        Right eye: No discharge.         Left eye: No discharge.      Extraocular Movements: Extraocular movements intact.       Conjunctiva/sclera: Conjunctivae normal.   Cardiovascular:      Rate and Rhythm: Normal rate and regular rhythm.   Pulmonary:      Effort: Pulmonary effort is normal. No respiratory distress.   Musculoskeletal:         General: Normal range of motion.      Cervical back: Normal range of motion and neck supple.   Skin:     General: Skin is warm and dry.      Comments: L. Sided abdominal and groin scar well healed, all lower extremity scars well healed   Neurological:      General: No focal deficit present.      Mental Status: He is alert and oriented to person, place, and time.   Psychiatric:         Mood and Affect: Mood normal.         Behavior: Behavior normal.       VASC:  RLE: 4+ palpable femoral pulse, aneurysm palpable, absent popliteal pulse; 2+ DP   LLE: 2+ palpable femoral pulse, absent popliteal pulse, 2+ DP     III. ASSESSMENT & PLAN (MEDICAL DECISION MAKING)       Imaging Results: (I have personally reviewed all images and provided interpretation below)  No new imaging-- prior CTA from April 2024 reviewed for planning of femoral aneurysm repair      Assessment/Diagnosis and Plan:    1. Femoral artery aneurysm, right        70 y.o. 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  He has had successful repair of his bilateral popliteal artery aneurysms and left common femoral artery aneurysm. His incision is healing well and he has very few complaints.  He would like to move forward with repair of his right femoral aneurysm soon. WE discussed risks, benefits, and alternatives and patient expressed understanding and agreed to proceed.    -continue asa 81mg daily  -Continue home Xarelto for Afib-- hold 48 hours pre-op  - Plan for open RIGHT common Femoral artery aneurysm repair     MOI Childers II, MD, [facility]  Vascular Surgery  [facility]

## 2025-07-16 ENCOUNTER — TELEPHONE (OUTPATIENT)
Dept: VASCULAR SURGERY | Facility: CLINIC | Age: 70
End: 2025-07-16
Payer: MEDICARE

## 2025-07-16 ENCOUNTER — ANESTHESIA EVENT (OUTPATIENT)
Dept: SURGERY | Facility: HOSPITAL | Age: 70
End: 2025-07-16
Payer: MEDICARE

## 2025-07-16 NOTE — ANESTHESIA PREPROCEDURE EVALUATION
[facility]  Anesthesia Pre-Operative Evaluation   07/16/2025        Tan Gilliam, 1955  [id number]  Procedure(s) (LRB):  REPAIR, ANEURYSM, ARTERY, FEMORAL (Right)    Subjective    Tan Gilliam is a 70 y.o. 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 Recently had L femoral artery aneurysm repair done, present for R femoral artery repair.    Last Xarelto 7/14/25    Patient now presents for above procedure(s).     Prev Airway:   Date Department Mask Airway Size Difficult Intubation Attempts   03/05/[address] - Surgery (2nd Fl) moderately difficult with oral airway 7.5 No 1   10/31/24 [address] - Surgery (2nd Fl) easy with oral airway 7.5 No 1   05/21/24 [address] - Surgery (2nd Fl) moderately difficult with oral airway 7.5 No 1       LDA: None documented.       Drips: None documented.      Problem List[1]    Review of patient's allergies indicates:   Allergen Reactions    Influenza virus vaccines Other (See Comments)       Current Inpatient Medications:       Medications Ordered Prior to Encounter[2]    Past Surgical History:   Procedure Laterality Date    ANGIOGRAPHY Right 5/21/2024    Procedure: ANGIOGRAM - right lower extremity;  Surgeon: MOI Childers II, MD;  Location: [address];  Service: Vascular;  Laterality: Right;    COLONOSCOPY N/A 4/25/2019    Procedure: COLONOSCOPY;  Surgeon: Luis Bogran-Reyes, MD;  Location: [facility];  Service: Endoscopy;  Laterality: N/A;    CREATION OF FEMOROPOPLITEAL ARTERIAL BYPASS USING GRAFT Right 5/21/2024    Procedure: CREATION, BYPASS, ARTERIAL, FEMORAL TO POPLITEAL, USING GRAFT;  Surgeon: MOI Childers II, MD;  Location: [facility] OR [address] [place] [address];  Service: Vascular;  Laterality: Right;     CREATION OF FEMOROPOPLITEAL ARTERIAL BYPASS USING GRAFT Left 10/31/2024    Procedure: CREATION, BYPASS, ARTERIAL, FEMORAL TO POPLITEAL, USING GRAFT;  Surgeon: MOI Childers II, MD;  Location: [facility] OR [address] [place] [address];  Service: Vascular;  Laterality: Left;    HEMORRHOID SURGERY      NOSE SURGERY      REPAIR OF ANEURYSM OF FEMORAL ARTERY Left 3/5/2025    Procedure: REPAIR, ANEURYSM, ARTERY, FEMORAL;  Surgeon: MOI Childers II, MD;  Location: [facility] OR [address] [place] [address];  Service: Vascular;  Laterality: Left;    TONSILLECTOMY         Social History:  Tobacco Use: Medium Risk (6/30/2025)    Patient History     Smoking Tobacco Use: Former     Smokeless Tobacco Use: Never     Passive Exposure: Not on file       Alcohol Use: Patient Unable To Answer (3/7/2025)    AUDIT-C     Frequency of Alcohol Consumption: Patient unable to answer     Average Number of Drinks: Patient unable to answer     Frequency of Binge Drinking: Patient unable to answer       Objective    Vital Signs Range:  BMI Readings from Last 1 Encounters:   06/30/25 28.65 kg/m²               Significant Labs:        Component Value Date/Time    WBC 8.00 06/30/2025 0948    HGB 8.9 (L) 06/30/2025 0948    HGB 8.3 (L) 03/07/2025 0715    HCT 30.4 (L) 06/30/2025 0948    HCT 26.9 (L) 03/07/2025 0715    HCT 28 (L) 03/05/2025 1251     06/30/2025 0948     03/07/2025 0715     06/30/2025 0948     03/06/2025 0445    K 4.7 06/30/2025 0948    K 4.4 03/06/2025 0445     06/30/2025 0948     03/06/2025 0445    CO2 26 06/30/2025 0948    CO2 23 03/06/2025 0445    GLU 88 06/30/2025 0948     03/06/2025 0445    BUN 15 06/30/2025 0948    CREATININE 1.2 06/30/2025 0948    MG 1.7 05/23/2024 0407    PHOS 2.5 (L) 05/23/2024 0407    CALCIUM 8.9 06/30/2025 0948    CALCIUM 7.9 (L) 03/06/2025 0445    ALBUMIN 3.6 06/15/2024 1131    PROT 7.3 06/15/2024 1131    ALKPHOS 93 06/15/2024 1131    BILITOT 1.0 06/15/2024 1131    AST 14 06/15/2024 1131    ALT 12  06/15/2024 1131    INR 1.1 03/16/2024 1950        Please see Results Review for additional labs.     Diagnostic Studies: All relevant studies, reviewed.      EKG:   Results for orders placed or performed during the hospital encounter of 06/30/25   SCHEDULED EKG 12-LEAD (to Muse)    Collection Time: 06/30/25 10:13 AM   Result Value Ref Range    QRS Duration 92 ms    [facility] QTC Calculation 420 ms    Narrative    Test Reason : Z01.818,    Vent. Rate :  60 BPM     Atrial Rate :  60 BPM     P-R Int : 202 ms          QRS Dur :  92 ms      QT Int : 420 ms       P-R-T Axes :  77  21  30 degrees    QTcB Int : 420 ms    Normal sinus rhythm  Normal ECG  When compared with ECG of 21-May-2024 22:48,  Premature ventricular complexes are no longer Present  T wave amplitude has increased in Anterior-lateral leads  Confirmed by Tarik Frausto ([id number]) on 6/30/2025 10:50:22 AM    Referred By: MOI LEOS II           Confirmed By: Tarik Frausto       ECHO:  No results found for this or any previous visit.         Pre-op Assessment    I have reviewed the Patient Summary Reports.     I have reviewed the Nursing Notes. I have reviewed the NPO Status.   I have reviewed the Medications.     Review of Systems  Anesthesia Hx:  No problems with previous Anesthesia   History of prior surgery of interest to airway management or planning:          Denies Family Hx of Anesthesia complications.   Personal Hx of Anesthesia complications, Post-Operative Nausea/Vomiting                    Cardiovascular:     Hypertension    Dysrhythmias atrial fibrillation     PVD hyperlipidemia     Patient on beta blockers                          Pulmonary:    Asthma                    Neurological:   CVA, residual symptoms                                        Physical Exam  General: Alert, Oriented, Cooperative, Well nourished and Anxious    Airway:  Mallampati: II / III/ II/ I  Mouth Opening: Normal  TM Distance: Normal  Tongue: Normal  Neck ROM:  Normal ROM    Dental:  Edentulous, Periodontal disease        Anesthesia Plan  Type of Anesthesia, risks & benefits discussed:    Anesthesia Type: Gen ETT  Intra-op Monitoring Plan: Standard ASA Monitors and Art Line  Post Op Pain Control Plan: multimodal analgesia and IV/PO Opioids PRN  Induction:  IV  Airway Plan: Video, Post-Induction  Informed Consent: Informed consent signed with the Patient and all parties understand the risks and agree with anesthesia plan.  All questions answered. Patient consented to blood products? Yes  ASA Score: 3  Day of Surgery Review of History & Physical: H&P Update referred to the surgeon/provider.    Ready For Surgery From Anesthesia Perspective.     .           [1]   Patient Active Problem List  Diagnosis    Essential hypertension    Psoriasis    Arthralgia    Trigger finger, right middle finger    Paresthesia and pain of both upper extremities    Left sided sciatica    Mild intermittent asthma without complication    Symptomatic bradycardia    Bilateral popliteal artery aneurysm    Pre-op exam    PAD (peripheral artery disease)    Femoral artery aneurysm, left   [2]   No current facility-administered medications on file prior to encounter.     Current Outpatient Medications on File Prior to Encounter   Medication Sig Dispense Refill    albuterol (PROVENTIL/VENTOLIN HFA) 90 mcg/actuation inhaler Inhale 2 puffs into the lungs every 6 (six) hours as needed for Wheezing. Rescue      amLODIPine (NORVASC) 5 MG tablet Take 5 mg by mouth once daily.      aspirin (ECOTRIN) 81 MG EC tablet Take 81 mg by mouth once daily.      COREG 6.25 mg tablet Take 6.25 mg by mouth 2 (two) times daily.      lisinopriL (PRINIVIL,ZESTRIL) 40 MG tablet TAKE 1 TABLET BY MOUTH ONCE DAILY IN THE EVENING 90 tablet 0    TRELEGY ELLIPTA 200-62.5-25 mcg inhaler INHALE 1 PUFF ONCE DAILY      acetaminophen (TYLENOL) 325 MG tablet Take 2 tablets (650 mg total) by mouth every 6 (six) hours as needed for Pain.  (Patient not taking: Reported on 6/30/2025) 30 tablet 0    adalimumab-adbm (CYLTEZO,CF, PEN) 40 mg/0.8 mL PnKt Inject 1 pen  (40 mg total) into the skin every 14 (fourteen) days. 2 pen 11    ammonium lactate (LAC-HYDRIN) 12 % lotion Apply topically 2 (two) times daily. 1 each 10    docusate sodium (COLACE) 100 MG capsule Take 1 capsule (100 mg total) by mouth 2 (two) times daily as needed for Constipation (please take while taking opioid pain medication). (Patient not taking: Reported on 6/30/2025) 30 capsule 0    dupilumab (DUPIXENT PEN) 300 mg/2 mL PnIj Inject 2 mLs (300 mg total) into the skin every 14 (fourteen) days. 4 mL 11    HYDROcodone-acetaminophen (NORCO) 5-325 mg per tablet Take 1 tablet by mouth every 4 (four) hours as needed for Pain. (Patient not taking: Reported on 6/30/2025) 28 tablet 0    HYDROmorphone (DILAUDID) 4 MG tablet Take 0.5 tablets (2 mg total) by mouth every 4 (four) hours as needed for Pain (take half (0.5) tablet as needed for pain. 2mg per dose.). (Patient not taking: Reported on 6/30/2025) 21 tablet 0    naproxen (NAPROSYN) 500 MG tablet TAKE 1 TABLET BY MOUTH EVERY 12 HOURS WITH FOOD OR MILK AS NEEDED for 30 (Patient not taking: Reported on 6/30/2025)      VENTOLIN HFA 90 mcg/actuation inhaler INHALE 2 PUFFS BY MOUTH EVERY 6 HOURS AS NEEDED FOR WHEEZING (Patient not taking: Reported on 6/30/2025) 18 g 0    XARELTO 20 mg Tab Take 1 tablet (20 mg total) by mouth once daily. 60 tablet 0

## 2025-07-16 NOTE — TELEPHONE ENCOUNTER
Spoke with Vishnu(son), time of arrival 0530am 2nd floor [facility] for Mr Mane surgery on 7/17/2025 confirmed.

## 2025-07-16 NOTE — PRE-PROCEDURE INSTRUCTIONS
Anesthesia Pre-Op Instructions given:     *Please DO NOT take Xarelto for 3 days before your procedure.*  -- YOUR SURGEON'S OFFICE WILL PROVIDE YOUR ARRIVAL TIME  -- Medication information (what to hold and what to take)   -- NPO guidelines as follows: (or as per your Surgeon)  Stop ALL solid food, gum, candy 8 hours before arrival time.  Stop all CLOUDY liquids: coffee with creamer, cloudy juices, 8 hours prior to arrival time.  The patient should be ENCOURAGED to drink carbohydrate-rich clear liquids (sports drinks, clear juices) until 2 hours prior to arrival time.  Stop clear liquids 2 hours prior to arrival time.  CLEAR liquids include water, black coffee NO creamer, clear oral rehydration drinks, clear sports drinks and clear fruit juices (no orange juice, no pulpy juices, no apple cider).   IF IN DOUBT, drink water instead.   NOTHING TO DRINK 2 hours before surgery/procedure time. If you are told to take medication on the morning of surgery, it may be taken with a sip of water.   -- *Arrival place and directions given*.  Time to be given the day before procedure by the Surgeon's Office   -- Bathe with antibacterial soap (dial or Hibiclens as instructed)  -- Don't wear any jewelry or valuables and not metals on skin or hair AM of surgery   -- No makeup or moisturizer to face   -- No perfume/cologne, powder, lotions, aftershave or deodorant     Pt verbalized understanding.            *If going to , see below:      Directions and Instructions for [facility]   At [facility], we have an outstanding team of physicians, anesthesiologists, CRNAs, Registered Nurses, Surgical Technologists, and other ancillary team members all focused on your surgical and procedural care.   Before Your Procedure:   The physician's office will call you with a specific arrival time and directions a day or two before your scheduled procedure. You may also receive these instructions through your  [facility] portal.   Day of Procedure:   Please be sure to arrive at the arrival time given or you may risk your surgery being delayed or canceled. The arrival time is earlier than your scheduled surgery or procedure time. In the winter months please dress warm and bring blankets for you or your child as the waiting room may be cold. If you have difficulty locating the facility, please give us a call at [phone].   Directions:   The [facility] is located on the 1st floor of the hospital building near the [address] entrance.   Parking:   You will park in the [facility] Parking Garage (note location on map). [facility] opens at 5:00 a.m. and has a drop off area by the entrance.  parking is available starting at 7:00 a.m. Please see below for further  parking instructions.   Directions from the parking garage elevators   [address] Elevators: From the parking garage, take the blue [address] elevators (located in the center of the parking garage) to the 1st floor of the garage. You will then take a right once off the elevators then another right to the outside of the parking garage. You will be across from the [facility]. You will walk down the sidewalk, pass the  curve at the [address] entrance and continue to follow the sidewalk. You will pass the radiation oncology entrance on your right. Continue to follow the sidewalk to the [facility] glass door entrance.   Hospital Entrance (Inside Route): If a mostly inside route is preferred: Take the inside elevator bank (located at the far north end of the garage) from the parking garage to the 1st floor. On the 1st floor walk past [employer]. Keep walking down the center of the hallway towards the hospital elevators. Once you reach the red brick [place], take a left and go past the hospital elevators. Take another left and follow the blue and white [address] signs around the hallway to the end.  Go outside of the door. You will see the [facility] entrance to your right.   Drop Off:   There is a drop off area at the doors of the [facility] for your convenience. If utilized for pediatric patients, an adult must accompany the patient into the surgery center while another adult mcmullen the vehicle.    (at 7:00 a.m.):   Upon check-in, please let the  know that you are utilizing [employer] parking which is free. The . will then call [employer] for your car to be picked up. Your keys and phone number will be collected and given to [employer] services. You will then be given a ticket. Upon discharge, [employer] will be notified to bring your vehicle back when you are ready.           Directions to [facility]:  [phone]     From 1st floor garage elevators: go past [facility] [employer] shop. Look for Black piano on side of coffee shop. Take Atrium (gold) elevator by piano up to 2nd floor. When you exit elevator follow long hallway (you should see a sign hanging from the ceiling that says Day of Surgery Family Waiting Room. When hallway ends you will be entering the day of surgery waiting area. Check in at the desk for your procedure.      From [facility] entrance: Make a right after you enter the door to the hospital. On your Left, take [address] elevator to 2nd floor. Check in at desk      From Lab desk on 2nd floor: Exit lab area toward hospital atrium. You should see a sign that says Day of Surgery [facility] Waiting Area. Make a Left and follow long hallway (you should see a sign hanging from the ceiling that says Day of Surgery [facility] Waiting Room. When hallway ends you will be entering the day of surgery waiting area. Check in at the desk for your procedure.

## 2025-07-17 ENCOUNTER — HOSPITAL ENCOUNTER (INPATIENT)
Facility: HOSPITAL | Age: 70
LOS: 3 days | Discharge: HOME | DRG: 271 | End: 2025-07-20
Attending: SURGERY | Admitting: SURGERY
Payer: MEDICARE

## 2025-07-17 ENCOUNTER — ANESTHESIA (OUTPATIENT)
Dept: SURGERY | Facility: HOSPITAL | Age: 70
End: 2025-07-17
Payer: MEDICARE

## 2025-07-17 DIAGNOSIS — I72.4: Primary | ICD-10-CM

## 2025-07-17 DIAGNOSIS — I72.4 FEMORAL ARTERY ANEURYSM, RIGHT: ICD-10-CM

## 2025-07-17 LAB
ABSOLUTE EOSINOPHIL (OHS): 0 K/UL
ABSOLUTE MONOCYTE (OHS): 0.14 K/UL (ref 0.3–1)
ABSOLUTE NEUTROPHIL COUNT (OHS): 6.76 K/UL (ref 1.8–7.7)
BASOPHILS # BLD AUTO: 0.02 K/UL
BASOPHILS NFR BLD AUTO: 0.3 %
ERYTHROCYTE [DISTWIDTH] IN BLOOD BY AUTOMATED COUNT: 16.1 % (ref 11.5–14.5)
HCT VFR BLD AUTO: 25.4 % (ref 40–54)
HGB BLD-MCNC: 7.3 GM/DL (ref 14–18)
IMM GRANULOCYTES # BLD AUTO: 0.02 K/UL (ref 0–0.04)
IMM GRANULOCYTES NFR BLD AUTO: 0.3 % (ref 0–0.5)
INDIRECT COOMBS: NORMAL
LYMPHOCYTES # BLD AUTO: 0.75 K/UL (ref 1–4.8)
MCH RBC QN AUTO: 20.9 PG (ref 27–31)
MCHC RBC AUTO-ENTMCNC: 28.7 G/DL (ref 32–36)
MCV RBC AUTO: 73 FL (ref 82–98)
NUCLEATED RBC (/100WBC) (OHS): 0 /100 WBC
PLATELET # BLD AUTO: 268 K/UL (ref 150–450)
PMV BLD AUTO: 9.9 FL (ref 9.2–12.9)
POC ACTIVATED CLOTTING TIME K: 199 SEC (ref 74–137)
POC ACTIVATED CLOTTING TIME K: 216 SEC (ref 74–137)
POC ACTIVATED CLOTTING TIME K: 227 SEC (ref 74–137)
POC ACTIVATED CLOTTING TIME K: 239 SEC (ref 74–137)
POC ACTIVATED CLOTTING TIME K: 239 SEC (ref 74–137)
POC ACTIVATED CLOTTING TIME K: 245 SEC (ref 74–137)
RBC # BLD AUTO: 3.5 M/UL (ref 4.6–6.2)
RELATIVE EOSINOPHIL (OHS): 0 %
RELATIVE LYMPHOCYTE (OHS): 9.8 % (ref 18–48)
RELATIVE MONOCYTE (OHS): 1.8 % (ref 4–15)
RELATIVE NEUTROPHIL (OHS): 87.8 % (ref 38–73)
RH BLD: NORMAL
SAMPLE: ABNORMAL
SPECIMEN OUTDATE: NORMAL
WBC # BLD AUTO: 7.69 K/UL (ref 3.9–12.7)

## 2025-07-17 PROCEDURE — 71000033 HC RECOVERY, INTIAL HOUR: Performed by: SURGERY

## 2025-07-17 PROCEDURE — 86900 BLOOD TYPING SEROLOGIC ABO: CPT | Performed by: UROLOGY

## 2025-07-17 PROCEDURE — 25000003 PHARM REV CODE 250

## 2025-07-17 PROCEDURE — 71000015 HC POSTOP RECOV 1ST HR: Performed by: SURGERY

## 2025-07-17 PROCEDURE — 63600175 PHARM REV CODE 636 W HCPCS

## 2025-07-17 PROCEDURE — 94761 N-INVAS EAR/PLS OXIMETRY MLT: CPT

## 2025-07-17 PROCEDURE — 86920 COMPATIBILITY TEST SPIN: CPT

## 2025-07-17 PROCEDURE — 25000003 PHARM REV CODE 250: Performed by: STUDENT IN AN ORGANIZED HEALTH CARE EDUCATION/TRAINING PROGRAM

## 2025-07-17 PROCEDURE — 27201037 HC PRESSURE MONITORING SET UP

## 2025-07-17 PROCEDURE — C2628 CATHETER, OCCLUSION: HCPCS | Performed by: SURGERY

## 2025-07-17 PROCEDURE — 63600175 PHARM REV CODE 636 W HCPCS: Performed by: STUDENT IN AN ORGANIZED HEALTH CARE EDUCATION/TRAINING PROGRAM

## 2025-07-17 PROCEDURE — 36000706: Performed by: SURGERY

## 2025-07-17 PROCEDURE — 37000009 HC ANESTHESIA EA ADD 15 MINS: Performed by: SURGERY

## 2025-07-17 PROCEDURE — 27201423 OPTIME MED/SURG SUP & DEVICES STERILE SUPPLY: Performed by: SURGERY

## 2025-07-17 PROCEDURE — 85025 COMPLETE CBC W/AUTO DIFF WBC: CPT

## 2025-07-17 PROCEDURE — 36000707: Performed by: SURGERY

## 2025-07-17 PROCEDURE — L8670 VASCULAR GRAFT, SYNTHETIC: HCPCS | Performed by: SURGERY

## 2025-07-17 PROCEDURE — 11000001 HC ACUTE MED/SURG PRIVATE ROOM

## 2025-07-17 PROCEDURE — 63600175 PHARM REV CODE 636 W HCPCS: Performed by: SURGERY

## 2025-07-17 PROCEDURE — 27000221 HC OXYGEN, UP TO 24 HOURS

## 2025-07-17 PROCEDURE — 25000003 PHARM REV CODE 250: Performed by: SURGERY

## 2025-07-17 PROCEDURE — C1769 GUIDE WIRE: HCPCS | Performed by: SURGERY

## 2025-07-17 PROCEDURE — 71000016 HC POSTOP RECOV ADDL HR: Performed by: SURGERY

## 2025-07-17 PROCEDURE — 99900035 HC TECH TIME PER 15 MIN (STAT)

## 2025-07-17 PROCEDURE — 37000008 HC ANESTHESIA 1ST 15 MINUTES: Performed by: SURGERY

## 2025-07-17 DEVICE — PROPATEN VASCULAR GRAFT TW RR 8MMX40CM 30CM RINGS HEPARIN
Type: IMPLANTABLE DEVICE | Site: FEMORAL | Status: FUNCTIONAL
Brand: GORE PROPATEN VASCULAR GRAFT

## 2025-07-17 RX ORDER — OXYCODONE HYDROCHLORIDE 10 MG/1
10 TABLET ORAL EVERY 4 HOURS PRN
Refills: 0 | Status: DISCONTINUED | OUTPATIENT
Start: 2025-07-17 | End: 2025-07-20 | Stop reason: HOSPADM

## 2025-07-17 RX ORDER — CEFAZOLIN 2 G/1
2 INJECTION, POWDER, FOR SOLUTION INTRAMUSCULAR; INTRAVENOUS
Status: COMPLETED | OUTPATIENT
Start: 2025-07-17 | End: 2025-07-18

## 2025-07-17 RX ORDER — HEPARIN SOD,PORCINE/0.9 % NACL 1000/500ML
INTRAVENOUS SOLUTION INTRAVENOUS
Status: DISCONTINUED | OUTPATIENT
Start: 2025-07-17 | End: 2025-07-17 | Stop reason: HOSPADM

## 2025-07-17 RX ORDER — ACETAMINOPHEN 500 MG
1000 TABLET ORAL
Status: COMPLETED | OUTPATIENT
Start: 2025-07-17 | End: 2025-07-17

## 2025-07-17 RX ORDER — OXYCODONE HYDROCHLORIDE 5 MG/1
5 TABLET ORAL EVERY 4 HOURS PRN
Refills: 0 | Status: DISCONTINUED | OUTPATIENT
Start: 2025-07-17 | End: 2025-07-20 | Stop reason: HOSPADM

## 2025-07-17 RX ORDER — HYDROMORPHONE HYDROCHLORIDE 1 MG/ML
INJECTION, SOLUTION INTRAMUSCULAR; INTRAVENOUS; SUBCUTANEOUS
Status: DISCONTINUED | OUTPATIENT
Start: 2025-07-17 | End: 2025-07-17

## 2025-07-17 RX ORDER — SODIUM CHLORIDE 0.9 % (FLUSH) 0.9 %
10 SYRINGE (ML) INJECTION
Status: DISCONTINUED | OUTPATIENT
Start: 2025-07-17 | End: 2025-07-17 | Stop reason: HOSPADM

## 2025-07-17 RX ORDER — HALOPERIDOL LACTATE 5 MG/ML
0.5 INJECTION, SOLUTION INTRAMUSCULAR EVERY 10 MIN PRN
Status: DISCONTINUED | OUTPATIENT
Start: 2025-07-17 | End: 2025-07-17 | Stop reason: HOSPADM

## 2025-07-17 RX ORDER — ALBUTEROL SULFATE 90 UG/1
2 INHALANT RESPIRATORY (INHALATION) EVERY 6 HOURS PRN
Status: DISCONTINUED | OUTPATIENT
Start: 2025-07-17 | End: 2025-07-20

## 2025-07-17 RX ORDER — LISINOPRIL 20 MG/1
40 TABLET ORAL NIGHTLY
Status: DISCONTINUED | OUTPATIENT
Start: 2025-07-17 | End: 2025-07-20 | Stop reason: HOSPADM

## 2025-07-17 RX ORDER — PROPOFOL 10 MG/ML
VIAL (ML) INTRAVENOUS
Status: DISCONTINUED | OUTPATIENT
Start: 2025-07-17 | End: 2025-07-17

## 2025-07-17 RX ORDER — PROTAMINE SULFATE 10 MG/ML
INJECTION, SOLUTION INTRAVENOUS
Status: DISCONTINUED | OUTPATIENT
Start: 2025-07-17 | End: 2025-07-17

## 2025-07-17 RX ORDER — SODIUM CHLORIDE 9 MG/ML
INJECTION, SOLUTION INTRAVENOUS CONTINUOUS
Status: DISCONTINUED | OUTPATIENT
Start: 2025-07-17 | End: 2025-07-19

## 2025-07-17 RX ORDER — CEFAZOLIN SODIUM 1 G/3ML
INJECTION, POWDER, FOR SOLUTION INTRAMUSCULAR; INTRAVENOUS
Status: DISCONTINUED | OUTPATIENT
Start: 2025-07-17 | End: 2025-07-17

## 2025-07-17 RX ORDER — PHENYLEPHRINE HYDROCHLORIDE 10 MG/ML
INJECTION INTRAVENOUS
Status: DISCONTINUED | OUTPATIENT
Start: 2025-07-17 | End: 2025-07-17

## 2025-07-17 RX ORDER — LIDOCAINE HYDROCHLORIDE 10 MG/ML
1 INJECTION, SOLUTION EPIDURAL; INFILTRATION; INTRACAUDAL; PERINEURAL ONCE AS NEEDED
Status: DISCONTINUED | OUTPATIENT
Start: 2025-07-17 | End: 2025-07-17 | Stop reason: HOSPADM

## 2025-07-17 RX ORDER — GLUCAGON 1 MG
1 KIT INJECTION
Status: DISCONTINUED | OUTPATIENT
Start: 2025-07-17 | End: 2025-07-17 | Stop reason: HOSPADM

## 2025-07-17 RX ORDER — ALBUTEROL SULFATE 90 UG/1
2 INHALANT RESPIRATORY (INHALATION) EVERY 6 HOURS PRN
Status: DISCONTINUED | OUTPATIENT
Start: 2025-07-17 | End: 2025-07-20 | Stop reason: HOSPADM

## 2025-07-17 RX ORDER — DOCUSATE SODIUM 100 MG/1
100 CAPSULE, LIQUID FILLED ORAL 2 TIMES DAILY PRN
Status: DISCONTINUED | OUTPATIENT
Start: 2025-07-17 | End: 2025-07-20 | Stop reason: HOSPADM

## 2025-07-17 RX ORDER — FENTANYL CITRATE 50 UG/ML
INJECTION, SOLUTION INTRAMUSCULAR; INTRAVENOUS
Status: DISCONTINUED | OUTPATIENT
Start: 2025-07-17 | End: 2025-07-17

## 2025-07-17 RX ORDER — ASPIRIN 81 MG/1
81 TABLET ORAL DAILY
Status: DISCONTINUED | OUTPATIENT
Start: 2025-07-17 | End: 2025-07-20 | Stop reason: HOSPADM

## 2025-07-17 RX ORDER — CARVEDILOL 6.25 MG/1
6.25 TABLET ORAL 2 TIMES DAILY
Status: DISCONTINUED | OUTPATIENT
Start: 2025-07-17 | End: 2025-07-20 | Stop reason: HOSPADM

## 2025-07-17 RX ORDER — LIDOCAINE HYDROCHLORIDE 20 MG/ML
INJECTION INTRAVENOUS
Status: DISCONTINUED | OUTPATIENT
Start: 2025-07-17 | End: 2025-07-17

## 2025-07-17 RX ORDER — HEPARIN SODIUM 5000 [USP'U]/ML
5000 INJECTION, SOLUTION INTRAVENOUS; SUBCUTANEOUS EVERY 8 HOURS
Status: DISCONTINUED | OUTPATIENT
Start: 2025-07-17 | End: 2025-07-20 | Stop reason: HOSPADM

## 2025-07-17 RX ORDER — MUPIROCIN 20 MG/G
OINTMENT TOPICAL 2 TIMES DAILY
Status: DISCONTINUED | OUTPATIENT
Start: 2025-07-17 | End: 2025-07-20 | Stop reason: HOSPADM

## 2025-07-17 RX ORDER — ACETAMINOPHEN 325 MG/1
650 TABLET ORAL EVERY 6 HOURS
Status: DISCONTINUED | OUTPATIENT
Start: 2025-07-17 | End: 2025-07-20 | Stop reason: HOSPADM

## 2025-07-17 RX ORDER — SODIUM CHLORIDE, SODIUM LACTATE, POTASSIUM CHLORIDE, CALCIUM CHLORIDE 600; 310; 30; 20 MG/100ML; MG/100ML; MG/100ML; MG/100ML
INJECTION, SOLUTION INTRAVENOUS CONTINUOUS
Status: DISCONTINUED | OUTPATIENT
Start: 2025-07-17 | End: 2025-07-18

## 2025-07-17 RX ORDER — DEXAMETHASONE SODIUM PHOSPHATE 4 MG/ML
INJECTION, SOLUTION INTRA-ARTICULAR; INTRALESIONAL; INTRAMUSCULAR; INTRAVENOUS; SOFT TISSUE
Status: DISCONTINUED | OUTPATIENT
Start: 2025-07-17 | End: 2025-07-17

## 2025-07-17 RX ORDER — ONDANSETRON HYDROCHLORIDE 2 MG/ML
INJECTION, SOLUTION INTRAVENOUS
Status: DISCONTINUED | OUTPATIENT
Start: 2025-07-17 | End: 2025-07-17

## 2025-07-17 RX ORDER — ROCURONIUM BROMIDE 10 MG/ML
INJECTION, SOLUTION INTRAVENOUS
Status: DISCONTINUED | OUTPATIENT
Start: 2025-07-17 | End: 2025-07-17

## 2025-07-17 RX ORDER — AMLODIPINE BESYLATE 5 MG/1
5 TABLET ORAL DAILY
Status: DISCONTINUED | OUTPATIENT
Start: 2025-07-17 | End: 2025-07-20 | Stop reason: HOSPADM

## 2025-07-17 RX ORDER — FENTANYL CITRATE 50 UG/ML
25 INJECTION, SOLUTION INTRAMUSCULAR; INTRAVENOUS EVERY 5 MIN PRN
Status: DISCONTINUED | OUTPATIENT
Start: 2025-07-17 | End: 2025-07-17 | Stop reason: HOSPADM

## 2025-07-17 RX ORDER — HEPARIN SODIUM 1000 [USP'U]/ML
INJECTION, SOLUTION INTRAVENOUS; SUBCUTANEOUS
Status: DISCONTINUED | OUTPATIENT
Start: 2025-07-17 | End: 2025-07-17

## 2025-07-17 RX ADMIN — ACETAMINOPHEN 1000 MG: 500 TABLET ORAL at 05:07

## 2025-07-17 RX ADMIN — AMLODIPINE BESYLATE 5 MG: 5 TABLET ORAL at 01:07

## 2025-07-17 RX ADMIN — CEFAZOLIN 2 G: 330 INJECTION, POWDER, FOR SOLUTION INTRAMUSCULAR; INTRAVENOUS at 07:07

## 2025-07-17 RX ADMIN — ASPIRIN 81 MG: 81 TABLET, COATED ORAL at 01:07

## 2025-07-17 RX ADMIN — CARVEDILOL 6.25 MG: 6.25 TABLET, FILM COATED ORAL at 08:07

## 2025-07-17 RX ADMIN — PHENYLEPHRINE HYDROCHLORIDE 100 MCG: 10 INJECTION INTRAVENOUS at 10:07

## 2025-07-17 RX ADMIN — SODIUM CHLORIDE: 0.9 INJECTION, SOLUTION INTRAVENOUS at 07:07

## 2025-07-17 RX ADMIN — PROTAMINE SULFATE 25 MG: 10 INJECTION, SOLUTION INTRAVENOUS at 11:07

## 2025-07-17 RX ADMIN — PHENYLEPHRINE HYDROCHLORIDE 100 MCG: 10 INJECTION INTRAVENOUS at 08:07

## 2025-07-17 RX ADMIN — HEPARIN SODIUM 3000 UNITS: 1000 INJECTION, SOLUTION INTRAVENOUS; SUBCUTANEOUS at 10:07

## 2025-07-17 RX ADMIN — SODIUM CHLORIDE: 0.9 INJECTION, SOLUTION INTRAVENOUS at 09:07

## 2025-07-17 RX ADMIN — SODIUM CHLORIDE, SODIUM GLUCONATE, SODIUM ACETATE, POTASSIUM CHLORIDE, MAGNESIUM CHLORIDE, SODIUM PHOSPHATE, DIBASIC, AND POTASSIUM PHOSPHATE: .53; .5; .37; .037; .03; .012; .00082 INJECTION, SOLUTION INTRAVENOUS at 07:07

## 2025-07-17 RX ADMIN — PHENYLEPHRINE HYDROCHLORIDE 50 MCG: 10 INJECTION INTRAVENOUS at 12:07

## 2025-07-17 RX ADMIN — SUGAMMADEX 200 MG: 100 INJECTION, SOLUTION INTRAVENOUS at 11:07

## 2025-07-17 RX ADMIN — HEPARIN SODIUM 2000 UNITS: 1000 INJECTION, SOLUTION INTRAVENOUS; SUBCUTANEOUS at 10:07

## 2025-07-17 RX ADMIN — HEPARIN SODIUM 5000 UNITS: 5000 INJECTION INTRAVENOUS; SUBCUTANEOUS at 01:07

## 2025-07-17 RX ADMIN — SODIUM CHLORIDE, POTASSIUM CHLORIDE, SODIUM LACTATE AND CALCIUM CHLORIDE: 600; 310; 30; 20 INJECTION, SOLUTION INTRAVENOUS at 12:07

## 2025-07-17 RX ADMIN — CEFAZOLIN 2 G: 330 INJECTION, POWDER, FOR SOLUTION INTRAMUSCULAR; INTRAVENOUS at 11:07

## 2025-07-17 RX ADMIN — DEXAMETHASONE SODIUM PHOSPHATE 4 MG: 4 INJECTION, SOLUTION INTRAMUSCULAR; INTRAVENOUS at 07:07

## 2025-07-17 RX ADMIN — PHENYLEPHRINE HYDROCHLORIDE 100 MCG: 10 INJECTION INTRAVENOUS at 09:07

## 2025-07-17 RX ADMIN — CEFAZOLIN 2 G: 2 INJECTION, POWDER, FOR SOLUTION INTRAMUSCULAR; INTRAVENOUS at 08:07

## 2025-07-17 RX ADMIN — PHENYLEPHRINE HYDROCHLORIDE 200 MCG: 10 INJECTION INTRAVENOUS at 10:07

## 2025-07-17 RX ADMIN — ROCURONIUM BROMIDE 20 MG: 10 INJECTION, SOLUTION INTRAVENOUS at 07:07

## 2025-07-17 RX ADMIN — PROPOFOL 160 MG: 10 INJECTION, EMULSION INTRAVENOUS at 07:07

## 2025-07-17 RX ADMIN — ONDANSETRON 4 MG: 2 INJECTION INTRAMUSCULAR; INTRAVENOUS at 11:07

## 2025-07-17 RX ADMIN — HYDROMORPHONE HYDROCHLORIDE 0.2 MG: 1 INJECTION, SOLUTION INTRAMUSCULAR; INTRAVENOUS; SUBCUTANEOUS at 11:07

## 2025-07-17 RX ADMIN — ROCURONIUM BROMIDE 20 MG: 10 INJECTION, SOLUTION INTRAVENOUS at 08:07

## 2025-07-17 RX ADMIN — HYDROMORPHONE HYDROCHLORIDE 0.5 MG: 1 INJECTION, SOLUTION INTRAMUSCULAR; INTRAVENOUS; SUBCUTANEOUS at 11:07

## 2025-07-17 RX ADMIN — HEPARIN SODIUM 3000 UNITS: 1000 INJECTION, SOLUTION INTRAVENOUS; SUBCUTANEOUS at 09:07

## 2025-07-17 RX ADMIN — ROCURONIUM BROMIDE 20 MG: 10 INJECTION, SOLUTION INTRAVENOUS at 10:07

## 2025-07-17 RX ADMIN — SODIUM CHLORIDE: 0.9 INJECTION, SOLUTION INTRAVENOUS at 10:07

## 2025-07-17 RX ADMIN — MUPIROCIN: 20 OINTMENT TOPICAL at 08:07

## 2025-07-17 RX ADMIN — LISINOPRIL 40 MG: 20 TABLET ORAL at 08:07

## 2025-07-17 RX ADMIN — ROCURONIUM BROMIDE 60 MG: 10 INJECTION, SOLUTION INTRAVENOUS at 07:07

## 2025-07-17 RX ADMIN — HYDROMORPHONE HYDROCHLORIDE 0.3 MG: 1 INJECTION, SOLUTION INTRAMUSCULAR; INTRAVENOUS; SUBCUTANEOUS at 12:07

## 2025-07-17 RX ADMIN — PHENYLEPHRINE HYDROCHLORIDE 100 MCG: 10 INJECTION INTRAVENOUS at 11:07

## 2025-07-17 RX ADMIN — PROTAMINE SULFATE 5 MG: 10 INJECTION, SOLUTION INTRAVENOUS at 11:07

## 2025-07-17 RX ADMIN — HEPARIN SODIUM 8000 UNITS: 1000 INJECTION, SOLUTION INTRAVENOUS; SUBCUTANEOUS at 09:07

## 2025-07-17 RX ADMIN — FENTANYL CITRATE 50 MCG: 50 INJECTION, SOLUTION INTRAMUSCULAR; INTRAVENOUS at 08:07

## 2025-07-17 RX ADMIN — PHENYLEPHRINE HYDROCHLORIDE 200 MCG: 10 INJECTION INTRAVENOUS at 07:07

## 2025-07-17 RX ADMIN — ACETAMINOPHEN 650 MG: 325 TABLET ORAL at 06:07

## 2025-07-17 RX ADMIN — ROCURONIUM BROMIDE 20 MG: 10 INJECTION, SOLUTION INTRAVENOUS at 09:07

## 2025-07-17 RX ADMIN — ROCURONIUM BROMIDE 20 MG: 10 INJECTION, SOLUTION INTRAVENOUS at 11:07

## 2025-07-17 RX ADMIN — HEPARIN SODIUM 5000 UNITS: 5000 INJECTION INTRAVENOUS; SUBCUTANEOUS at 09:07

## 2025-07-17 RX ADMIN — SODIUM CHLORIDE: 9 INJECTION, SOLUTION INTRAVENOUS at 05:07

## 2025-07-17 RX ADMIN — OXYCODONE HYDROCHLORIDE 5 MG: 5 TABLET ORAL at 01:07

## 2025-07-17 RX ADMIN — SODIUM CHLORIDE: 0.9 INJECTION, SOLUTION INTRAVENOUS at 06:07

## 2025-07-17 RX ADMIN — LIDOCAINE HYDROCHLORIDE 100 MG: 20 INJECTION INTRAVENOUS at 07:07

## 2025-07-17 NOTE — TRANSFER OF CARE
"Anesthesia Transfer of Care Note    Patient: Tan Gilliam    Procedure(s) Performed: Procedure(s) (LRB):  REPAIR, ANEURYSM, ARTERY, FEMORAL (Right)    Patient location: PACU    Anesthesia Type: general    Transport from OR: Transported from OR on 6-10 L/min O2 by face mask with adequate spontaneous ventilation    Post pain: adequate analgesia    Post assessment: no apparent anesthetic complications and tolerated procedure well    Post vital signs: stable    Level of consciousness: sedated    Nausea/Vomiting: no nausea/vomiting    Complications: none    Transfer of care protocol was followed      Last vitals: Visit Vitals  /66 (BP Location: Left arm, Patient Position: Lying)   Pulse 67   Temp 36.7 °C (98.1 °F) (Skin)   Resp 18   Ht 5' 9" (1.753 m)   Wt 87.3 kg (192 lb 7.4 oz)   SpO2 98%   BMI 28.42 kg/m²     "

## 2025-07-17 NOTE — ANESTHESIA PROCEDURE NOTES
Arterial    Diagnosis: femoral artery aneurysm    Patient location during procedure: done in OR    Staffing  Authorizing Provider: Armen Zaman MD  Performing Provider: Suman Arredondo MD    Staffing  Performed by: Suman Arredondo MD  Authorized by: Armen Zaman MD    Anesthesiologist was present at the time of the procedure.    Preanesthetic Checklist  Completed: patient identified, IV checked, site marked, risks and benefits discussed, surgical consent, monitors and equipment checked, pre-op evaluation, timeout performed and anesthesia consent givenArterial  Skin Prep: chlorhexidine gluconate and isopropyl alcohol  Orientation: right  Location: radial    Catheter Size: 20 G  Catheter placement by Ultrasound guidance. Heme positive aspiration all ports.   Vessel Caliber: patent, compressibility normal  Needle advanced into vessel with real time Ultrasound guidance.Insertion Attempts: 2  Assessment  Dressing: secured with tape and tegaderm  Patient: Tolerated well

## 2025-07-17 NOTE — OP NOTE
OPERATIVE REPORT    Patient: Tan Gilliam  Surgery Date: 07/17/2025  Surgeons and Role:     * MOI Childers II, MD - Primary     * Prince Pérez MD - Resident - Assisting     * Raymond Moore MD - Fellow     Pre-op Diagnosis:  Femoral artery aneurysm [I72.4]     Post-op Diagnosis:  Post-Op Diagnosis Codes:     * Femoral artery aneurysm [I72.4]     Procedure(s) (LRB):  Repair of Right Femoral aneurysm: Right external iliac to profunda interposition bypass with 10mm ringed PTFE, to SFA jump graft with 8mm branch     Anesthesia: General     Implants:  Implant Name Type Inv. Item Serial No.  Lot No. LRB No. Used Action   Gortex vascular graft     [id number]     Right 1 Implanted   GRAFT THIN WALL 8 X 40CM - [id number]   GRAFT THIN WALL 8 X 40CM [id number] [employer]   Right 1 Implanted         Operative Findings:   Repair of right femoral and profunda aneurysm with interposition to profunda and jump graft to SFA      Palpable DP pulse at conclusion of case.       Estimated Blood Loss: 100cc         Specimens:   N/A     Complications: None    Indications for Procedure:  Tan Gilliam is a 70 y.o. male who presented with a right femoral aneurysms status post repair of prior lower extremity aneurysms.  He was offered an open repair of his right femoral aneurysm. All risks, benefits, and alternatives were discussed and the patient understood and wished to proceed and gave written consent.     Operative Details:   The patient was brought to the operating room and placed on the operating room table in supine position.  He underwent general anesthesia with endotracheal tube intubation.  The bilateral groins were prepped and draped in the usual sterile fashion.  Perioperative antibiotics were administered.  A time-out was performed.      A longitudinal incision was made along the right common femoral artery, and this was carried down through the subcutaneous tissue, taking care to ligate any lymphatics,  crossing veins.  The femoral sheath was entered.  There was a strongly palpable right femoral pulse present.  Dissection continued proximally.  The lateral borders of the inguinal ligament were divided to allow for upward retraction and mobility of the inguinal ligament.  At the lateral border, the lateral circumflex artery was ligated and transected.  The medial epigastric was controlled with a silastic vessel loop.  Proximally, at the distal external iliac artery, the artery was dissected free, encircled with a silastic vessel loop.    The dissection extended distally, onto the SFA.  The SFA was circumferentially dissected free, encircled with a silastic vessel loop.  Continued our dissection onto the profunda.  Small crossing veins were ligated.  The main anterior and posterior branches of the profunda were circumferentially dissected free, encircled with silastic vessel loops.  Following this, the patient was systemically heparinized with IV heparin.     3 minutes after heparinization, the profunda, SFA were clamped, followed by the distal external iliac artery.  The aneurysm sac was opened.  There was backbleeding present from a large branch in the posterior main profunda, and this was oversewn with a 2-0 silk suture.  Proximally, the distal external iliac artery which was healthy was T'd off, and a 10 mm ringed PTFE graft sewn in in a parachute running fashion with a 5 0 Prolene.  After completion, the graft was clamped.  The proximal clamp was gently released.  There was some bleeding from the anastomosis present for which 3 repair sutures were required, 1 pledget was placed.  This was able to control the bleeding.  We then turned our attention onto the profunda.  The profunda was transected, and the graft trimmed to match.  This was then sewn on with a 4-0 Prolene in a running fashion.  Prior to completion, the profunda was back bled.  The repair was then completed.  The anastomosis was hemostatic, no  additional repair sutures were required.    The graft was then clamped proximally and distally.  At the midportion of the graft, an arteriotomy was made in an elliptical fashion.  The SFA was transected.  In order to match the SFA, which was much smaller in caliber, an 8 mm ringed PTFE graft was used.  This was sewn onto the graftotomy using a 5 0 Prolene.  The graft was then trimmed to match the SFA, and sewn on with a running 5 0 Prolene with the 4 knots technique.  Prior to completion, the SFA was back bled, the profunda back bled, and the graft forward bled.  The repair was then completed.     Protamine was reversed with heparin.  Hemostasis was achieved in the surgical bed with Gelfoam and thrombin.  The aneurysm sac was reapproximated over the common femoral artery.  The wound was then thoroughly irrigated.  The wound was closed in multiple layers, with deep 2-0 Vicryl, superficial 3-0 Vicryl, and the skin with 3-0 nylon and staples.  Dry sterile dressings were applied.    The patient had a palpable DP pulse at the conclusion of the case.    Dr. Childers was present and scrubbed for all aspects of the case.    All instrument and sponge counts were confirmed correct.     MOI Childers II, MD, [facility]  Vascular Surgery  [facility]

## 2025-07-17 NOTE — ANESTHESIA PROCEDURE NOTES
Intubation    Date/Time: 7/17/2025 7:25 AM    Performed by: Suman Arredondo MD  Authorized by: Armen Zaman MD    Intubation:     Induction:  Intravenous    Intubated:  Postinduction    Mask Ventilation:  Easy with oral airway    Attempts:  1    Attempted By:  Resident anesthesiologist    Method of Intubation:  Direct    Blade:  Amelia 3    Laryngeal View Grade: Grade IIA - cords partially seen      Difficult Airway Encountered?: No      Complications:  None    Airway Device:  Oral endotracheal tube    Airway Device Size:  7.5    Style/Cuff Inflation:  Cuffed (inflated to minimal occlusive pressure)    Tube secured:  23    Secured at:  The lips    Placement Verified By:  Capnometry    Complicating Factors:  None    Findings Post-Intubation:  BS equal bilateral  Notes:      Large tongue

## 2025-07-17 NOTE — BRIEF OP NOTE
[facility] (2nd Fl)  Brief Operative Note    SUMMARY     Surgery Date: 7/17/2025     Surgeons and Role:     * MOI Chiledrs II, MD - Primary     * Prince Pérez MD - Resident - Assisting     * Raymond Moore MD - Fellow        Pre-op Diagnosis:  Femoral artery aneurysm [I72.4]    Post-op Diagnosis:  Post-Op Diagnosis Codes:     * Femoral artery aneurysm [I72.4]    Procedure(s) (LRB):  Repair of Right Femoral aneurysm: Right external iliac to profunda interposition bypass with 10mm ringed PTFE, to SFA jump graft with 8mm branch    Anesthesia: General    Implants:  Implant Name Type Inv. Item Serial No.  Lot No. LRB No. Used Action   Gortex vascular graft   [id number]   Right 1 Implanted   GRAFT THIN WALL 8 X 40CM - [id number]  GRAFT THIN WALL 8 X 40CM [id number] [employer]  Right 1 Implanted       Operative Findings:   Repair of right femoral and profunda aneurysm with interposition to profunda and jump graft to SFA     Palpable DP pulse at conclusion of case.      Estimated Blood Loss: 100cc         Specimens:   N/A

## 2025-07-17 NOTE — ANESTHESIA POSTPROCEDURE EVALUATION
Anesthesia Post Evaluation    Patient: Tan Gilliam    Procedure(s) Performed: Procedure(s) (LRB):  REPAIR, ANEURYSM, ARTERY, FEMORAL (Right)    Final Anesthesia Type: general      Patient location during evaluation: PACU  Patient participation: Yes- Able to Participate  Level of consciousness: awake and alert and oriented  Post-procedure vital signs: reviewed and stable  Pain management: adequate  Airway patency: patent    PONV status at discharge: No PONV  Anesthetic complications: no      Cardiovascular status: hemodynamically stable  Respiratory status: unassisted  Hydration status: euvolemic  Follow-up not needed.              Vitals Value Taken Time   /62 07/17/25 17:17   Temp 36.5 °C (97.7 °F) 07/17/25 16:00   Pulse 67 07/17/25 17:18   Resp 21 07/17/25 17:18   SpO2 98 % 07/17/25 17:18   Vitals shown include unfiled device data.      No case tracking events are documented in the log.      Pain/Karo Score: Pain Rating Prior to Med Admin: 4 (7/17/2025  1:28 PM)  Pain Rating Post Med Admin: 2 (7/17/2025  2:00 PM)  Karo Score: 9 (7/17/2025  1:15 PM)

## 2025-07-18 PROBLEM — D62 ACUTE BLOOD LOSS ANEMIA (ABLA): Status: ACTIVE | Noted: 2025-07-18

## 2025-07-18 PROBLEM — E83.39 HYPOPHOSPHATEMIA: Status: ACTIVE | Noted: 2025-07-18

## 2025-07-18 LAB
ABSOLUTE EOSINOPHIL (OHS): 0.01 K/UL
ABSOLUTE EOSINOPHIL (OHS): 0.05 K/UL
ABSOLUTE MONOCYTE (OHS): 1.29 K/UL (ref 0.3–1)
ABSOLUTE MONOCYTE (OHS): 1.3 K/UL (ref 0.3–1)
ABSOLUTE NEUTROPHIL COUNT (OHS): 7.15 K/UL (ref 1.8–7.7)
ABSOLUTE NEUTROPHIL COUNT (OHS): 8.86 K/UL (ref 1.8–7.7)
ANION GAP (OHS): 6 MMOL/L (ref 8–16)
BASOPHILS # BLD AUTO: 0.03 K/UL
BASOPHILS # BLD AUTO: 0.06 K/UL
BASOPHILS NFR BLD AUTO: 0.3 %
BASOPHILS NFR BLD AUTO: 0.5 %
BUN SERPL-MCNC: 14 MG/DL (ref 8–23)
CALCIUM SERPL-MCNC: 7.9 MG/DL (ref 8.7–10.5)
CHLORIDE SERPL-SCNC: 108 MMOL/L (ref 95–110)
CO2 SERPL-SCNC: 25 MMOL/L (ref 23–29)
CREAT SERPL-MCNC: 1.2 MG/DL (ref 0.5–1.4)
ERYTHROCYTE [DISTWIDTH] IN BLOOD BY AUTOMATED COUNT: 16.2 % (ref 11.5–14.5)
ERYTHROCYTE [DISTWIDTH] IN BLOOD BY AUTOMATED COUNT: 16.7 % (ref 11.5–14.5)
GFR SERPLBLD CREATININE-BSD FMLA CKD-EPI: >60 ML/MIN/1.73/M2
GLUCOSE SERPL-MCNC: 137 MG/DL (ref 70–110)
HCT VFR BLD AUTO: 23 % (ref 40–54)
HCT VFR BLD AUTO: 26.4 % (ref 40–54)
HGB BLD-MCNC: 6.7 GM/DL (ref 14–18)
HGB BLD-MCNC: 7.9 GM/DL (ref 14–18)
IMM GRANULOCYTES # BLD AUTO: 0.03 K/UL (ref 0–0.04)
IMM GRANULOCYTES # BLD AUTO: 0.05 K/UL (ref 0–0.04)
IMM GRANULOCYTES NFR BLD AUTO: 0.3 % (ref 0–0.5)
IMM GRANULOCYTES NFR BLD AUTO: 0.4 % (ref 0–0.5)
LYMPHOCYTES # BLD AUTO: 1.74 K/UL (ref 1–4.8)
LYMPHOCYTES # BLD AUTO: 2.7 K/UL (ref 1–4.8)
MAGNESIUM SERPL-MCNC: 2 MG/DL (ref 1.6–2.6)
MCH RBC QN AUTO: 21.1 PG (ref 27–31)
MCH RBC QN AUTO: 21.6 PG (ref 27–31)
MCHC RBC AUTO-ENTMCNC: 29.1 G/DL (ref 32–36)
MCHC RBC AUTO-ENTMCNC: 29.9 G/DL (ref 32–36)
MCV RBC AUTO: 72 FL (ref 82–98)
MCV RBC AUTO: 72 FL (ref 82–98)
NUCLEATED RBC (/100WBC) (OHS): 0 /100 WBC
NUCLEATED RBC (/100WBC) (OHS): 0 /100 WBC
PHOSPHATE SERPL-MCNC: 2.4 MG/DL (ref 2.7–4.5)
PLATELET # BLD AUTO: 249 K/UL (ref 150–450)
PLATELET # BLD AUTO: 257 K/UL (ref 150–450)
PMV BLD AUTO: 9.1 FL (ref 9.2–12.9)
PMV BLD AUTO: 9.8 FL (ref 9.2–12.9)
POTASSIUM SERPL-SCNC: 4.4 MMOL/L (ref 3.5–5.1)
RBC # BLD AUTO: 3.18 M/UL (ref 4.6–6.2)
RBC # BLD AUTO: 3.65 M/UL (ref 4.6–6.2)
RELATIVE EOSINOPHIL (OHS): 0.1 %
RELATIVE EOSINOPHIL (OHS): 0.4 %
RELATIVE LYMPHOCYTE (OHS): 17 % (ref 18–48)
RELATIVE LYMPHOCYTE (OHS): 20.7 % (ref 18–48)
RELATIVE MONOCYTE (OHS): 10 % (ref 4–15)
RELATIVE MONOCYTE (OHS): 12.6 % (ref 4–15)
RELATIVE NEUTROPHIL (OHS): 68 % (ref 38–73)
RELATIVE NEUTROPHIL (OHS): 69.7 % (ref 38–73)
SODIUM SERPL-SCNC: 139 MMOL/L (ref 136–145)
WBC # BLD AUTO: 10.25 K/UL (ref 3.9–12.7)
WBC # BLD AUTO: 13.02 K/UL (ref 3.9–12.7)

## 2025-07-18 PROCEDURE — 11000001 HC ACUTE MED/SURG PRIVATE ROOM

## 2025-07-18 PROCEDURE — 94761 N-INVAS EAR/PLS OXIMETRY MLT: CPT

## 2025-07-18 PROCEDURE — 041H0JH BYPASS RIGHT EXTERNAL ILIAC ARTERY TO RIGHT FEMORAL ARTERY WITH SYNTHETIC SUBSTITUTE, OPEN APPROACH: ICD-10-PCS | Performed by: SURGERY

## 2025-07-18 PROCEDURE — P9016 RBC LEUKOCYTES REDUCED: HCPCS

## 2025-07-18 PROCEDURE — 84100 ASSAY OF PHOSPHORUS: CPT | Performed by: STUDENT IN AN ORGANIZED HEALTH CARE EDUCATION/TRAINING PROGRAM

## 2025-07-18 PROCEDURE — 36415 COLL VENOUS BLD VENIPUNCTURE: CPT | Performed by: STUDENT IN AN ORGANIZED HEALTH CARE EDUCATION/TRAINING PROGRAM

## 2025-07-18 PROCEDURE — 30233P1 TRANSFUSION OF NONAUTOLOGOUS FROZEN RED CELLS INTO PERIPHERAL VEIN, PERCUTANEOUS APPROACH: ICD-10-PCS | Performed by: SURGERY

## 2025-07-18 PROCEDURE — 85025 COMPLETE CBC W/AUTO DIFF WBC: CPT

## 2025-07-18 PROCEDURE — 25000003 PHARM REV CODE 250

## 2025-07-18 PROCEDURE — 25000003 PHARM REV CODE 250: Performed by: STUDENT IN AN ORGANIZED HEALTH CARE EDUCATION/TRAINING PROGRAM

## 2025-07-18 PROCEDURE — 63600175 PHARM REV CODE 636 W HCPCS: Performed by: STUDENT IN AN ORGANIZED HEALTH CARE EDUCATION/TRAINING PROGRAM

## 2025-07-18 PROCEDURE — 25000242 PHARM REV CODE 250 ALT 637 W/ HCPCS

## 2025-07-18 PROCEDURE — 94799 UNLISTED PULMONARY SVC/PX: CPT

## 2025-07-18 PROCEDURE — 83735 ASSAY OF MAGNESIUM: CPT | Performed by: STUDENT IN AN ORGANIZED HEALTH CARE EDUCATION/TRAINING PROGRAM

## 2025-07-18 PROCEDURE — 94640 AIRWAY INHALATION TREATMENT: CPT

## 2025-07-18 PROCEDURE — 85025 COMPLETE CBC W/AUTO DIFF WBC: CPT | Performed by: STUDENT IN AN ORGANIZED HEALTH CARE EDUCATION/TRAINING PROGRAM

## 2025-07-18 PROCEDURE — 36415 COLL VENOUS BLD VENIPUNCTURE: CPT

## 2025-07-18 PROCEDURE — 82310 ASSAY OF CALCIUM: CPT | Performed by: STUDENT IN AN ORGANIZED HEALTH CARE EDUCATION/TRAINING PROGRAM

## 2025-07-18 PROCEDURE — 36430 TRANSFUSION BLD/BLD COMPNT: CPT

## 2025-07-18 RX ORDER — POLYETHYLENE GLYCOL 3350 17 G/17G
17 POWDER, FOR SOLUTION ORAL DAILY
Status: DISCONTINUED | OUTPATIENT
Start: 2025-07-18 | End: 2025-07-20 | Stop reason: HOSPADM

## 2025-07-18 RX ORDER — PANTOPRAZOLE SODIUM 40 MG/1
40 TABLET, DELAYED RELEASE ORAL DAILY
Status: DISCONTINUED | OUTPATIENT
Start: 2025-07-18 | End: 2025-07-20 | Stop reason: HOSPADM

## 2025-07-18 RX ORDER — ATORVASTATIN CALCIUM 40 MG/1
40 TABLET, FILM COATED ORAL DAILY
Status: DISCONTINUED | OUTPATIENT
Start: 2025-07-18 | End: 2025-07-20 | Stop reason: HOSPADM

## 2025-07-18 RX ORDER — FLUTICASONE FUROATE AND VILANTEROL 200; 25 UG/1; UG/1
1 POWDER RESPIRATORY (INHALATION) DAILY
Status: DISCONTINUED | OUTPATIENT
Start: 2025-07-18 | End: 2025-07-20 | Stop reason: HOSPADM

## 2025-07-18 RX ORDER — HYDROCODONE BITARTRATE AND ACETAMINOPHEN 500; 5 MG/1; MG/1
TABLET ORAL
Status: DISCONTINUED | OUTPATIENT
Start: 2025-07-18 | End: 2025-07-20 | Stop reason: HOSPADM

## 2025-07-18 RX ORDER — SODIUM,POTASSIUM PHOSPHATES 280-250MG
2 POWDER IN PACKET (EA) ORAL EVERY 4 HOURS
Status: COMPLETED | OUTPATIENT
Start: 2025-07-18 | End: 2025-07-18

## 2025-07-18 RX ADMIN — FLUTICASONE FUROATE AND VILANTEROL TRIFENATATE 1 PUFF: 200; 25 POWDER RESPIRATORY (INHALATION) at 10:07

## 2025-07-18 RX ADMIN — PANTOPRAZOLE SODIUM 40 MG: 40 TABLET, DELAYED RELEASE ORAL at 12:07

## 2025-07-18 RX ADMIN — HEPARIN SODIUM 5000 UNITS: 5000 INJECTION INTRAVENOUS; SUBCUTANEOUS at 09:07

## 2025-07-18 RX ADMIN — POTASSIUM & SODIUM PHOSPHATES POWDER PACK 280-160-250 MG 2 PACKET: 280-160-250 PACK at 09:07

## 2025-07-18 RX ADMIN — MUPIROCIN: 20 OINTMENT TOPICAL at 09:07

## 2025-07-18 RX ADMIN — LISINOPRIL 40 MG: 20 TABLET ORAL at 09:07

## 2025-07-18 RX ADMIN — OXYCODONE HYDROCHLORIDE 10 MG: 10 TABLET ORAL at 04:07

## 2025-07-18 RX ADMIN — ASPIRIN 81 MG: 81 TABLET, COATED ORAL at 09:07

## 2025-07-18 RX ADMIN — CARVEDILOL 6.25 MG: 6.25 TABLET, FILM COATED ORAL at 09:07

## 2025-07-18 RX ADMIN — HEPARIN SODIUM 5000 UNITS: 5000 INJECTION INTRAVENOUS; SUBCUTANEOUS at 06:07

## 2025-07-18 RX ADMIN — DOCUSATE SODIUM 50 MG: 50 CAPSULE, LIQUID FILLED ORAL at 12:07

## 2025-07-18 RX ADMIN — CEFAZOLIN 2 G: 2 INJECTION, POWDER, FOR SOLUTION INTRAMUSCULAR; INTRAVENOUS at 02:07

## 2025-07-18 RX ADMIN — POTASSIUM & SODIUM PHOSPHATES POWDER PACK 280-160-250 MG 2 PACKET: 280-160-250 PACK at 06:07

## 2025-07-18 RX ADMIN — ACETAMINOPHEN 650 MG: 325 TABLET ORAL at 04:07

## 2025-07-18 RX ADMIN — OXYCODONE HYDROCHLORIDE 5 MG: 5 TABLET ORAL at 10:07

## 2025-07-18 RX ADMIN — CEFAZOLIN 2 G: 2 INJECTION, POWDER, FOR SOLUTION INTRAMUSCULAR; INTRAVENOUS at 06:07

## 2025-07-18 RX ADMIN — HEPARIN SODIUM 5000 UNITS: 5000 INJECTION INTRAVENOUS; SUBCUTANEOUS at 02:07

## 2025-07-18 RX ADMIN — ATORVASTATIN CALCIUM 40 MG: 40 TABLET, FILM COATED ORAL at 12:07

## 2025-07-18 RX ADMIN — ACETAMINOPHEN 650 MG: 325 TABLET ORAL at 12:07

## 2025-07-18 RX ADMIN — AMLODIPINE BESYLATE 5 MG: 5 TABLET ORAL at 09:07

## 2025-07-18 RX ADMIN — POLYETHYLENE GLYCOL 3350 17 G: 17 POWDER, FOR SOLUTION ORAL at 12:07

## 2025-07-18 NOTE — DISCHARGE INSTRUCTIONS
Instrucciones de rafia para cirugía vascular  Ducharse cada aminata.   Permita que el agua y el jabón corran sobre marinelli incisión. Séquela con suaves toques después.  Por favor, no frote ni restriegue la incisión. Séquela dando golpecitos suaves.  No use loción sobre la incisión. Puede cubrirla con gasa estéril y cinta adhesiva si es necesario.    No bañarse para 6 semañas  No nadar en la piscina, en el nida, jacuzzi para 6 semañas   No levantar objetos de más de 10 libras (4,5 kg) (por ejemplo un galón de leche)    Llame de oficina de cirugía vascular si nota:   Signos de infección: pus, enojecimiento, fiebre de 100.4 °F (38 °C) o más rafia, escalofríos o melissa herida que no miguel, la hinchazón no mejora o empeora.    Llame al 911 immediateamente parar recibir ayunda de emergencia si presenta lo siguiente:  Dolor de pecho  Problemas para respirar  Pulso rápido  Mareos

## 2025-07-18 NOTE — NURSING
Li removed. Patient is due to void by 1330. Educated patient to use urinal and to notify nurse once voided.

## 2025-07-18 NOTE — PLAN OF CARE
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 - [place], [place] - [address]  [address]  [place] [address]  Phone: [phone] Fax: [phone]    [facility]  [address]  [place] [address]  Phone: [phone] Fax: [phone]    [facility] - [place], [place] - [address]  [address] [address]  Phone: [phone] Fax: [phone]      Initial Assessment (most recent)       Adult Discharge Assessment - 07/18/25 1435          Discharge Assessment    Assessment Type Discharge Planning Assessment     Confirmed/corrected address, phone number and insurance Yes     Confirmed Demographics Correct on Facesheet     Source of Information patient;family     Communicated TIARRA with patient/caregiver Yes     People in Home child(yulisa), adult;spouse     Name(s) of People in Home Vishnu (Son) Therease (Spouse)     Facility Arrived From: Home     Do you expect to return to your current living situation? Yes     Do you have help at home or someone to help you manage your care at home? Yes     Who are your caregiver(s) and their phone number(s)? Vishnu (Son)     Prior to hospitilization cognitive status: Alert/Oriented     Current cognitive status: Alert/Oriented     Equipment Currently Used at Home walker, rolling      Readmission within 30 days? No     Patient currently being followed by outpatient case management? No     Do you currently have service(s) that help you manage your care at home? No     Do you take prescription medications? Yes     Do you have prescription coverage? Yes     Do you have any problems affording any of your prescribed medications? No     Is the patient taking medications as prescribed? yes     Who is going to help you get home at discharge? son and wife     How do you get to doctors appointments? family or friend will provide     Are you on dialysis? No     Do you take coumadin? No     Discharge Plan A Home with family     DME Needed Upon Discharge  none        Physical Activity    On average, how many days per week do you engage in moderate to strenuous exercise (like a brisk walk)? 2 days     On average, how many minutes do you engage in exercise at this level? 20 min        Financial Resource Strain    How hard is it for you to pay for the very basics like food, housing, medical care, and heating? Not hard at all        Housing Stability    In the last 12 months, was there a time when you were not able to pay the mortgage or rent on time? No     At any time in the past 12 months, were you homeless or living in a shelter (including now)? No        Transportation Needs    In the past 12 months, has lack of transportation kept you from medical appointments or from getting medications? No     In the past 12 months, has lack of transportation kept you from meetings, work, or from getting things needed for daily living? No        Food Insecurity    Within the past 12 months, you worried that your food would run out before you got the money to buy more. Never true     Within the past 12 months, the food you bought just didn't last and you didn't have money to get more. Never true        Stress    Do you feel stress - tense, restless, nervous, or anxious, or unable to sleep at night because your mind is  troubled all the time - these days? Not at all        Social Isolation    How often do you feel lonely or isolated from those around you?  Never        Alcohol Use    Q1: How often do you have a drink containing alcohol? Monthly or less     Q2: How many drinks containing alcohol do you have on a typical day when you are drinking? 1 or 2        Utilities    In the past 12 months has the electric, gas, oil, or water company threatened to shut off services in your home? No        Health Literacy    How often do you need to have someone help you when you read instructions, pamphlets, or other written material from your doctor or pharmacy? Never                      Spoke with patient, wife and son at bedside to complete d/c planning assessment. Patient lives with his son and wife in a single story home with One threshold to enter. He is Independent with a rolling walker and has his personal DME at bedside. Verified PCP, Pharmacy and health insurance. Patient's son is a physical therapist and says he is ambulating safely with his RW and CGA. PT/OT eval remains pending at this time. Will continue to follow for d/c needs. Discharge Plan A and Plan B have been determined by review of patient's clinical status, future medical and therapeutic needs, and coverage/benefits for post-acute care in coordination with multidisciplinary team members.      Carmelita MORALES  Case Management  [facility]  [phone]

## 2025-07-18 NOTE — PROGRESS NOTES
[facility]  Vascular Surgery  Progress Note    Patient Name: Tan Gilliam  MRN: [id number]  Admission Date: 7/17/2025  Primary Care Provider: Cory Che MD    Subjective:     Interval History: POD 1 NAEON afebrile and stable. He is getting blood transfusion this am for Hgb 6.7. He is content with the results post op, pain is well controlled. D/c thompson today. Encourage patient to get OOB for all meals and work with PT and OT. Will repeat CBC this evening and transfuse for Hgb < 7.     Post-Op Info:  Procedure(s) (LRB):  REPAIR, ANEURYSM, ARTERY, FEMORAL (Right)   1 Day Post-Op     Medications:  Continuous Infusions:   0.9% NaCl   Intravenous Continuous 10 mL/hr at 07/17/25 0542 New Bag at 07/17/25 0542    lactated ringers   Intravenous Continuous 50 mL/hr at 07/17/25 2011 Rate Verify at 07/17/25 2011     Scheduled Meds:   acetaminophen  650 mg Oral Q6H    amLODIPine  5 mg Oral Daily    aspirin  81 mg Oral Daily    carvediloL  6.25 mg Oral BID    ceFAZolin (Ancef) IV (PEDS and ADULTS)  2 g Intravenous Q8H    fluticasone-umeclidin-vilanter  1 puff Inhalation Daily    heparin (porcine)  5,000 Units Subcutaneous Q8H    lisinopriL  40 mg Oral QHS    mupirocin   Nasal BID    potassium, sodium phosphates  2 packet Oral Q4H     PRN Meds:  Current Facility-Administered Medications:     0.9%  NaCl infusion (for blood administration), , Intravenous, Q24H PRN    albuterol, 2 puff, Inhalation, Q6H PRN    albuterol, 2 puff, Inhalation, Q6H PRN    docusate sodium, 100 mg, Oral, BID PRN    oxyCODONE, 5 mg, Oral, Q4H PRN    oxyCODONE, 10 mg, Oral, Q4H PRN     Objective:     Vital Signs (Most Recent):  Temp: 98.5 °F (36.9 °C) (07/18/25 0644)  Pulse: 65 (07/18/25 0644)  Resp: 16 (07/18/25 0644)  BP: (!) 105/59 (07/18/25 0644)  SpO2: 95 % (07/18/25 0644) Vital Signs (24h Range):  Temp:  [97.7 °F (36.5 °C)-98.5 °F (36.9 °C)] 98.5 °F (36.9 °C)  Pulse:  [62-77] 65  Resp:  [16-21] 16  SpO2:  [92 %-100 %] 95 %  BP:  (101-121)/(56-68) 105/59     Date 07/18/25 0700 - 07/19/25 0659   Shift 9331-8536 2906-2199 0164-3533 24 Hour Total   INTAKE   Shift Total(mL/kg)       OUTPUT   Urine(mL/kg/hr) 730   730   Shift Total(mL/kg) 730(8.1)   730(8.1)   Weight (kg) 89.9 89.9 89.9 89.9        Physical Exam  Constitutional:       Appearance: He is overweight.   HENT:      Head: Atraumatic.      Nose: Nose normal.      Mouth/Throat:      Mouth: Mucous membranes are moist.   Cardiovascular:      Rate and Rhythm: Normal rate.      Pulses: Normal pulses.      Comments: Palpable Right DP and PT   Pulmonary:      Effort: Pulmonary effort is normal.   Abdominal:      General: Abdomen is flat.   Skin:     General: Skin is warm and dry.      Comments: Right groin dressing CDI no swelling no drainage.    Neurological:      Mental Status: He is alert.      Sensory: Sensation is intact.      Gait: Gait is intact.        Significant Labs:  CBC:   Recent Labs   Lab 07/18/25  0211   WBC 10.25   RBC 3.18*   HGB 6.7*   HCT 23.0*      MCV 72*   MCH 21.1*   MCHC 29.1*     CMP:   Recent Labs   Lab 07/18/25  0211   *   CALCIUM 7.9*      K 4.4   CO2 25      BUN 14   CREATININE 1.2     Recent Lab Results  (Last 5 results in the past 24 hours)        07/18/25  0211   07/17/25  1433   07/17/25  1051   07/17/25  1037   07/17/25  1026        Anion Gap 6               Baso # 0.03   0.02             Basophil % 0.3   0.3             BUN 14               Calcium 7.9               Chloride 108               CO2 25               Creatinine 1.2               eGFR >60  Comment: Estimated GFR calculated using the CKD-EPI creatinine (2021) equation.               Eos # 0.01   0.00             Eos % 0.1   0.0             Glucose 137               Gran # (ANC) 7.15   6.76             Hematocrit 23.0   25.4             Hemoglobin 6.7   7.3             Immature Grans (Abs) 0.03  Comment: Mild elevation in immature granulocytes is non specific and can be  seen in a variety of conditions including stress response, acute inflammation, trauma and pregnancy. Correlation with other laboratory and clinical findings is essential.   0.02  Comment: Mild elevation in immature granulocytes is non specific and can be seen in a variety of conditions including stress response, acute inflammation, trauma and pregnancy. Correlation with other laboratory and clinical findings is essential.             Immature Granulocytes 0.3   0.3             Lymph # 1.74   0.75             Lymph % 17.0   9.8             Magnesium  2.0               MCH 21.1   20.9             MCHC 29.1   28.7             MCV 72   73             Mono # 1.29   0.14             Mono % 12.6   1.8             MPV 9.8   9.9             Neut % 69.7   87.8             nRBC 0   0             Phosphorus Level 2.4               Platelet Count 257   268             POC ACTIVATED CLOTTING TIME K     245   239   239       Potassium 4.4               RBC 3.18   3.50             RDW 16.2   16.1             Sample     ARTERIAL   ARTERIAL   ARTERIAL       Sodium 139               WBC 10.25   7.69                                  All pertinent labs from the last 24 hours have been reviewed.    Significant Diagnostics:  I have reviewed all pertinent imaging results/findings within the past 24 hours.  Assessment/Plan:     * Femoral artery aneurysm, left  Tan MC Gilliam 70 y.o. 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) and previous left femoral artery aneurysm repair 3/5/35 now s/p R common femoral artery aneurysm repair 7/18    - PT/OT  - out of bed for all meals, mobilize  - ASA  - MMPC  - PPI  - DVT Ppx  - IS  - Aspiration precautions  - bowel regimen   - expected discharge tomorrow    Hypophosphatemia  Phos 2.4  Replacements ordered      Acute blood loss anemia (ABLA)  - today: H/H 6.7/23  - need for replacement today, transfusing PRBC  - CBC monitor trends   - repeat CBC this PM, transfuse as  needed for target Hgb > 7    PAD (peripheral artery disease)  - ASA + statin      Mild intermittent asthma without complication  - ordered tiotropium bromide and fluticasone furoate-vilanteroL while inpatient as patient home medication torrieely ellipta not available on formulary  - prn albuterol       Essential hypertension  - on home dose of lisinopril, coreg, and amlodipine        Ida Penn, CNS  Vascular Surgery  [facility]

## 2025-07-18 NOTE — ASSESSMENT & PLAN NOTE
- today: H/H 6.7/23  - need for replacement today, transfusing PRBC  - CBC monitor trends   - repeat CBC this PM, transfuse as needed for target Hgb > 7

## 2025-07-18 NOTE — PT/OT/SLP PROGRESS
Occupational Therapy      Patient Name:  Tan Gilliam   MRN:  [id number]    Patient not seen today secondary to low lab values (H&H 6.7 and 23.0). Will follow-up once medically appropriate.    7/18/2025

## 2025-07-18 NOTE — PT/OT/SLP PROGRESS
Physical Therapy      Patient Name:  Tan Gilliam   MRN:  [id number]    Patient not seen today secondary to  (low lab values (H&H 6.7 and 23.0)). Will follow-up as appropriate.

## 2025-07-18 NOTE — PLAN OF CARE
Problem: Adult Inpatient Plan of Care  Goal: Plan of Care Review  Outcome: Progressing  Goal: Patient-Specific Goal (Individualized)  Outcome: Progressing  Goal: Absence of Hospital-Acquired Illness or Injury  Outcome: Progressing  Goal: Optimal Comfort and Wellbeing  Outcome: Progressing  Goal: Readiness for Transition of Care  Outcome: Progressing     Problem: Wound  Goal: Optimal Coping  Outcome: Progressing  Goal: Optimal Functional Ability  Outcome: Progressing     Patient denies any pain or discomfort overnight. Surgical dressing is dry and intact. Li in place. Educated patient and spouse at bedside on plan of care, both verbalized understanding. SCD on. Bed locked and in lowest position. Call light within reach.

## 2025-07-18 NOTE — NURSING
Patient arrived to room 511 via stretcher, transferred to bed without incident. AAOx4, Setswana speaking only. RLE incision clean, dry and intact. SCDs in place. VS stable. Bed at lowest point, side rails up x2 and call light within place. Family at bedside.

## 2025-07-18 NOTE — ASSESSMENT & PLAN NOTE
Tan Gilliam 70 y.o. 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

## 2025-07-18 NOTE — ASSESSMENT & PLAN NOTE
- ordered tiotropium bromide and fluticasone furoate-vilanteroL while inpatient as patient home medication torrieely ellipta not available on formulary  - prn albuterol

## 2025-07-18 NOTE — SUBJECTIVE & OBJECTIVE
Medications:  Continuous Infusions:   0.9% NaCl   Intravenous Continuous 10 mL/hr at 07/17/25 0542 New Bag at 07/17/25 0542    lactated ringers   Intravenous Continuous 50 mL/hr at 07/17/25 2011 Rate Verify at 07/17/25 2011     Scheduled Meds:   acetaminophen  650 mg Oral Q6H    amLODIPine  5 mg Oral Daily    aspirin  81 mg Oral Daily    carvediloL  6.25 mg Oral BID    ceFAZolin (Ancef) IV (PEDS and ADULTS)  2 g Intravenous Q8H    fluticasone-umeclidin-vilanter  1 puff Inhalation Daily    heparin (porcine)  5,000 Units Subcutaneous Q8H    lisinopriL  40 mg Oral QHS    mupirocin   Nasal BID    potassium, sodium phosphates  2 packet Oral Q4H     PRN Meds:  Current Facility-Administered Medications:     0.9%  NaCl infusion (for blood administration), , Intravenous, Q24H PRN    albuterol, 2 puff, Inhalation, Q6H PRN    albuterol, 2 puff, Inhalation, Q6H PRN    docusate sodium, 100 mg, Oral, BID PRN    oxyCODONE, 5 mg, Oral, Q4H PRN    oxyCODONE, 10 mg, Oral, Q4H PRN     Objective:     Vital Signs (Most Recent):  Temp: 98.5 °F (36.9 °C) (07/18/25 0644)  Pulse: 65 (07/18/25 0644)  Resp: 16 (07/18/25 0644)  BP: (!) 105/59 (07/18/25 0644)  SpO2: 95 % (07/18/25 0644) Vital Signs (24h Range):  Temp:  [97.7 °F (36.5 °C)-98.5 °F (36.9 °C)] 98.5 °F (36.9 °C)  Pulse:  [62-77] 65  Resp:  [16-21] 16  SpO2:  [92 %-100 %] 95 %  BP: (101-121)/(56-68) 105/59     Date 07/18/25 0700 - 07/19/25 0659   Shift 2883-3379 2157-4898 5628-4558 24 Hour Total   INTAKE   Shift Total(mL/kg)       OUTPUT   Urine(mL/kg/hr) 730   730   Shift Total(mL/kg) 730(8.1)   730(8.1)   Weight (kg) 89.9 89.9 89.9 89.9        Physical Exam  Constitutional:       Appearance: He is overweight.   HENT:      Head: Atraumatic.      Nose: Nose normal.      Mouth/Throat:      Mouth: Mucous membranes are moist.   Cardiovascular:      Rate and Rhythm: Normal rate.      Pulses: Normal pulses.      Comments: Palpable Right DP and PT   Pulmonary:      Effort: Pulmonary  effort is normal.   Abdominal:      General: Abdomen is flat.   Skin:     General: Skin is warm and dry.      Comments: Right groin dressing CDI no swelling no drainage.    Neurological:      Mental Status: He is alert.      Sensory: Sensation is intact.      Gait: Gait is intact.        Significant Labs:  CBC:   Recent Labs   Lab 07/18/25  0211   WBC 10.25   RBC 3.18*   HGB 6.7*   HCT 23.0*      MCV 72*   MCH 21.1*   MCHC 29.1*     CMP:   Recent Labs   Lab 07/18/25 0211   *   CALCIUM 7.9*      K 4.4   CO2 25      BUN 14   CREATININE 1.2     Recent Lab Results  (Last 5 results in the past 24 hours)        07/18/25  0211   07/17/25  1433   07/17/25  1051   07/17/25  1037   07/17/25  1026        Anion Gap 6               Baso # 0.03   0.02             Basophil % 0.3   0.3             BUN 14               Calcium 7.9               Chloride 108               CO2 25               Creatinine 1.2               eGFR >60  Comment: Estimated GFR calculated using the CKD-EPI creatinine (2021) equation.               Eos # 0.01   0.00             Eos % 0.1   0.0             Glucose 137               Gran # (ANC) 7.15   6.76             Hematocrit 23.0   25.4             Hemoglobin 6.7   7.3             Immature Grans (Abs) 0.03  Comment: Mild elevation in immature granulocytes is non specific and can be seen in a variety of conditions including stress response, acute inflammation, trauma and pregnancy. Correlation with other laboratory and clinical findings is essential.   0.02  Comment: Mild elevation in immature granulocytes is non specific and can be seen in a variety of conditions including stress response, acute inflammation, trauma and pregnancy. Correlation with other laboratory and clinical findings is essential.             Immature Granulocytes 0.3   0.3             Lymph # 1.74   0.75             Lymph % 17.0   9.8             Magnesium  2.0               MCH 21.1   20.9             MCHC  29.1   28.7             MCV 72   73             Mono # 1.29   0.14             Mono % 12.6   1.8             MPV 9.8   9.9             Neut % 69.7   87.8             nRBC 0   0             Phosphorus Level 2.4               Platelet Count 257   268             POC ACTIVATED CLOTTING TIME K     245   239   239       Potassium 4.4               RBC 3.18   3.50             RDW 16.2   16.1             Sample     ARTERIAL   ARTERIAL   ARTERIAL       Sodium 139               WBC 10.25   7.69                                  All pertinent labs from the last 24 hours have been reviewed.    Significant Diagnostics:  I have reviewed all pertinent imaging results/findings within the past 24 hours.

## 2025-07-19 LAB
ABSOLUTE EOSINOPHIL (OHS): 0.05 K/UL
ABSOLUTE EOSINOPHIL (OHS): 0.06 K/UL
ABSOLUTE MONOCYTE (OHS): 1.36 K/UL (ref 0.3–1)
ABSOLUTE MONOCYTE (OHS): 1.37 K/UL (ref 0.3–1)
ABSOLUTE NEUTROPHIL COUNT (OHS): 6.44 K/UL (ref 1.8–7.7)
ABSOLUTE NEUTROPHIL COUNT (OHS): 8.6 K/UL (ref 1.8–7.7)
ANION GAP (OHS): 8 MMOL/L (ref 8–16)
BASOPHILS # BLD AUTO: 0.05 K/UL
BASOPHILS # BLD AUTO: 0.06 K/UL
BASOPHILS NFR BLD AUTO: 0.5 %
BASOPHILS NFR BLD AUTO: 0.5 %
BUN SERPL-MCNC: 15 MG/DL (ref 8–23)
CALCIUM SERPL-MCNC: 7.8 MG/DL (ref 8.7–10.5)
CHLORIDE SERPL-SCNC: 104 MMOL/L (ref 95–110)
CO2 SERPL-SCNC: 24 MMOL/L (ref 23–29)
CREAT SERPL-MCNC: 1.2 MG/DL (ref 0.5–1.4)
ERYTHROCYTE [DISTWIDTH] IN BLOOD BY AUTOMATED COUNT: 17 % (ref 11.5–14.5)
ERYTHROCYTE [DISTWIDTH] IN BLOOD BY AUTOMATED COUNT: 17.3 % (ref 11.5–14.5)
GFR SERPLBLD CREATININE-BSD FMLA CKD-EPI: >60 ML/MIN/1.73/M2
GLUCOSE SERPL-MCNC: 94 MG/DL (ref 70–110)
HCT VFR BLD AUTO: 24.5 % (ref 40–54)
HCT VFR BLD AUTO: 25.4 % (ref 40–54)
HGB BLD-MCNC: 7.4 GM/DL (ref 14–18)
HGB BLD-MCNC: 7.6 GM/DL (ref 14–18)
IMM GRANULOCYTES # BLD AUTO: 0.06 K/UL (ref 0–0.04)
IMM GRANULOCYTES # BLD AUTO: 0.06 K/UL (ref 0–0.04)
IMM GRANULOCYTES NFR BLD AUTO: 0.5 % (ref 0–0.5)
IMM GRANULOCYTES NFR BLD AUTO: 0.6 % (ref 0–0.5)
LYMPHOCYTES # BLD AUTO: 1.92 K/UL (ref 1–4.8)
LYMPHOCYTES # BLD AUTO: 2.55 K/UL (ref 1–4.8)
MAGNESIUM SERPL-MCNC: 1.8 MG/DL (ref 1.6–2.6)
MCH RBC QN AUTO: 21.7 PG (ref 27–31)
MCH RBC QN AUTO: 21.9 PG (ref 27–31)
MCHC RBC AUTO-ENTMCNC: 29.9 G/DL (ref 32–36)
MCHC RBC AUTO-ENTMCNC: 30.2 G/DL (ref 32–36)
MCV RBC AUTO: 73 FL (ref 82–98)
MCV RBC AUTO: 73 FL (ref 82–98)
NUCLEATED RBC (/100WBC) (OHS): 0 /100 WBC
NUCLEATED RBC (/100WBC) (OHS): 0 /100 WBC
PHOSPHATE SERPL-MCNC: 3.1 MG/DL (ref 2.7–4.5)
PLATELET # BLD AUTO: 233 K/UL (ref 150–450)
PLATELET # BLD AUTO: 239 K/UL (ref 150–450)
PMV BLD AUTO: 9.3 FL (ref 9.2–12.9)
PMV BLD AUTO: 9.6 FL (ref 9.2–12.9)
POTASSIUM SERPL-SCNC: 4.2 MMOL/L (ref 3.5–5.1)
RBC # BLD AUTO: 3.38 M/UL (ref 4.6–6.2)
RBC # BLD AUTO: 3.5 M/UL (ref 4.6–6.2)
RELATIVE EOSINOPHIL (OHS): 0.4 %
RELATIVE EOSINOPHIL (OHS): 0.6 %
RELATIVE LYMPHOCYTE (OHS): 15.9 % (ref 18–48)
RELATIVE LYMPHOCYTE (OHS): 24.2 % (ref 18–48)
RELATIVE MONOCYTE (OHS): 11.3 % (ref 4–15)
RELATIVE MONOCYTE (OHS): 13 % (ref 4–15)
RELATIVE NEUTROPHIL (OHS): 61.1 % (ref 38–73)
RELATIVE NEUTROPHIL (OHS): 71.4 % (ref 38–73)
SODIUM SERPL-SCNC: 136 MMOL/L (ref 136–145)
WBC # BLD AUTO: 10.53 K/UL (ref 3.9–12.7)
WBC # BLD AUTO: 12.05 K/UL (ref 3.9–12.7)

## 2025-07-19 PROCEDURE — 94761 N-INVAS EAR/PLS OXIMETRY MLT: CPT

## 2025-07-19 PROCEDURE — 25000003 PHARM REV CODE 250

## 2025-07-19 PROCEDURE — 85025 COMPLETE CBC W/AUTO DIFF WBC: CPT

## 2025-07-19 PROCEDURE — 97162 PT EVAL MOD COMPLEX 30 MIN: CPT

## 2025-07-19 PROCEDURE — 97165 OT EVAL LOW COMPLEX 30 MIN: CPT

## 2025-07-19 PROCEDURE — 83735 ASSAY OF MAGNESIUM: CPT | Performed by: STUDENT IN AN ORGANIZED HEALTH CARE EDUCATION/TRAINING PROGRAM

## 2025-07-19 PROCEDURE — 99900035 HC TECH TIME PER 15 MIN (STAT)

## 2025-07-19 PROCEDURE — 84100 ASSAY OF PHOSPHORUS: CPT | Performed by: STUDENT IN AN ORGANIZED HEALTH CARE EDUCATION/TRAINING PROGRAM

## 2025-07-19 PROCEDURE — 80048 BASIC METABOLIC PNL TOTAL CA: CPT | Performed by: STUDENT IN AN ORGANIZED HEALTH CARE EDUCATION/TRAINING PROGRAM

## 2025-07-19 PROCEDURE — 11000001 HC ACUTE MED/SURG PRIVATE ROOM

## 2025-07-19 PROCEDURE — 85025 COMPLETE CBC W/AUTO DIFF WBC: CPT | Performed by: STUDENT IN AN ORGANIZED HEALTH CARE EDUCATION/TRAINING PROGRAM

## 2025-07-19 PROCEDURE — 36415 COLL VENOUS BLD VENIPUNCTURE: CPT

## 2025-07-19 PROCEDURE — 63600175 PHARM REV CODE 636 W HCPCS: Performed by: STUDENT IN AN ORGANIZED HEALTH CARE EDUCATION/TRAINING PROGRAM

## 2025-07-19 PROCEDURE — 36415 COLL VENOUS BLD VENIPUNCTURE: CPT | Performed by: STUDENT IN AN ORGANIZED HEALTH CARE EDUCATION/TRAINING PROGRAM

## 2025-07-19 PROCEDURE — 25000003 PHARM REV CODE 250: Performed by: STUDENT IN AN ORGANIZED HEALTH CARE EDUCATION/TRAINING PROGRAM

## 2025-07-19 RX ORDER — METHOCARBAMOL 750 MG/1
750 TABLET, FILM COATED ORAL 4 TIMES DAILY
Status: DISCONTINUED | OUTPATIENT
Start: 2025-07-19 | End: 2025-07-20 | Stop reason: HOSPADM

## 2025-07-19 RX ORDER — NAPROXEN 250 MG/1
250 TABLET ORAL 2 TIMES DAILY WITH MEALS
Status: DISCONTINUED | OUTPATIENT
Start: 2025-07-19 | End: 2025-07-20 | Stop reason: HOSPADM

## 2025-07-19 RX ORDER — METHOCARBAMOL 750 MG/1
750 TABLET, FILM COATED ORAL 4 TIMES DAILY
Status: DISCONTINUED | OUTPATIENT
Start: 2025-07-19 | End: 2025-07-19

## 2025-07-19 RX ORDER — METHOCARBAMOL 500 MG/1
TABLET, FILM COATED ORAL 4 TIMES DAILY
Status: CANCELLED | OUTPATIENT
Start: 2025-07-19

## 2025-07-19 RX ADMIN — HEPARIN SODIUM 5000 UNITS: 5000 INJECTION INTRAVENOUS; SUBCUTANEOUS at 10:07

## 2025-07-19 RX ADMIN — NAPROXEN 250 MG: 250 TABLET ORAL at 05:07

## 2025-07-19 RX ADMIN — HEPARIN SODIUM 5000 UNITS: 5000 INJECTION INTRAVENOUS; SUBCUTANEOUS at 03:07

## 2025-07-19 RX ADMIN — ACETAMINOPHEN 650 MG: 325 TABLET ORAL at 11:07

## 2025-07-19 RX ADMIN — ACETAMINOPHEN 650 MG: 325 TABLET ORAL at 06:07

## 2025-07-19 RX ADMIN — METHOCARBAMOL 750 MG: 750 TABLET ORAL at 10:07

## 2025-07-19 RX ADMIN — ACETAMINOPHEN 650 MG: 325 TABLET ORAL at 12:07

## 2025-07-19 RX ADMIN — HEPARIN SODIUM 5000 UNITS: 5000 INJECTION INTRAVENOUS; SUBCUTANEOUS at 06:07

## 2025-07-19 RX ADMIN — METHOCARBAMOL 750 MG: 750 TABLET ORAL at 05:07

## 2025-07-19 RX ADMIN — MUPIROCIN: 20 OINTMENT TOPICAL at 10:07

## 2025-07-19 RX ADMIN — METHOCARBAMOL 750 MG: 750 TABLET ORAL at 01:07

## 2025-07-19 RX ADMIN — ASPIRIN 81 MG: 81 TABLET, COATED ORAL at 10:07

## 2025-07-19 RX ADMIN — METHOCARBAMOL 750 MG: 750 TABLET ORAL at 12:07

## 2025-07-19 RX ADMIN — ATORVASTATIN CALCIUM 40 MG: 40 TABLET, FILM COATED ORAL at 10:07

## 2025-07-19 RX ADMIN — ACETAMINOPHEN 650 MG: 325 TABLET ORAL at 05:07

## 2025-07-19 NOTE — ASSESSMENT & PLAN NOTE
Tan Gilliam 70 y.o. 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

## 2025-07-19 NOTE — PROGRESS NOTES
[facility]  Vascular Surgery  Progress Note    Patient Name: Tan Gilliam  MRN: [id number]  Admission Date: 7/17/2025  Primary Care Provider: Cory Che MD    Subjective:     Interval History: Had a fever to 101.3 overnight that resolved with Tylenol. Reporting pain at groin incision site. Walked with PT/OT yesterday. Tolerating PO.    Post-Op Info:  Procedure(s) (LRB):  REPAIR, ANEURYSM, ARTERY, FEMORAL (Right)   2 Days Post-Op     Medications:  Continuous Infusions:  Scheduled Meds:   acetaminophen  650 mg Oral Q6H    amLODIPine  5 mg Oral Daily    aspirin  81 mg Oral Daily    atorvastatin  40 mg Oral Daily    carvediloL  6.25 mg Oral BID    docusate sodium  50 mg Oral Daily    fluticasone furoate-vilanteroL  1 puff Inhalation Daily    heparin (porcine)  5,000 Units Subcutaneous Q8H    lisinopriL  40 mg Oral QHS    methocarbamoL  750 mg Oral QID    mupirocin   Nasal BID    pantoprazole  40 mg Oral Daily    polyethylene glycol  17 g Oral Daily    tiotropium bromide  2 puff Inhalation Daily     PRN Meds:  Current Facility-Administered Medications:     0.9%  NaCl infusion (for blood administration), , Intravenous, Q24H PRN    albuterol, 2 puff, Inhalation, Q6H PRN    albuterol, 2 puff, Inhalation, Q6H PRN    docusate sodium, 100 mg, Oral, BID PRN    oxyCODONE, 5 mg, Oral, Q4H PRN    oxyCODONE, 10 mg, Oral, Q4H PRN     Objective:     Vital Signs (Most Recent):  Temp: 98.8 °F (37.1 °C) (07/19/25 0809)  Pulse: 66 (07/19/25 0809)  Resp: 16 (07/19/25 0809)  BP: (!) 107/58 (07/19/25 0809)  SpO2: 97 % (07/19/25 0809) Vital Signs (24h Range):  Temp:  [98.3 °F (36.8 °C)-101.3 °F (38.5 °C)] 98.8 °F (37.1 °C)  Pulse:  [66-77] 66  Resp:  [14-20] 16  SpO2:  [92 %-97 %] 97 %  BP: (102-153)/(58-74) 107/58          Physical Exam  Constitutional:       Appearance: He is overweight.   HENT:      Head: Atraumatic.      Nose: Nose normal.      Mouth/Throat:      Mouth: Mucous membranes are moist.   Cardiovascular:      Rate  and Rhythm: Normal rate.      Pulses: Normal pulses.      Comments: Palpable Right DP and PT   Pulmonary:      Effort: Pulmonary effort is normal.   Abdominal:      General: Abdomen is flat.   Skin:     General: Skin is warm and dry.      Comments: Right groin incision staples and suture line CDI   Neurological:      Mental Status: He is alert.      Sensory: Sensation is intact.      Gait: Gait is intact.          Significant Labs:  All pertinent labs from the last 24 hours have been reviewed.    Significant Diagnostics:  I have reviewed and interpreted all pertinent imaging results/findings within the past 24 hours.  Assessment/Plan:     * Femoral artery aneurysm, left  Tan Gilliam 70 y.o. 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) and previous left femoral artery aneurysm repair 3/5/35 now s/p R common femoral artery aneurysm repair 7/18    - PT/OT  - out of bed for all meals, mobilize  - ASA  - MMPC  - PPI  - DVT Ppx  - IS  - Aspiration precautions  - bowel regimen   - shower today  - will likely remain inpatient another night to ensure post-op fever has resolved    Hypophosphatemia  - Phos 3.1 today  - replete as necessary      Acute blood loss anemia (ABLA)  - today: H/H 7.4/24.5  - CBC monitor trends   - repeat CBC this PM, transfuse as needed for target Hgb > 7    PAD (peripheral artery disease)  - ASA + statin      Mild intermittent asthma without complication  - ordered tiotropium bromide and fluticasone furoate-vilanteroL while inpatient as patient home medication tregely ellipta not available on formulary  - prn albuterol       Essential hypertension  - on home dose of lisinopril, coreg, and amlodipine        Laith Johnson MD  Vascular Surgery  [facility]

## 2025-07-19 NOTE — ASSESSMENT & PLAN NOTE
- today: H/H 7.4/24.5  - CBC monitor trends   - repeat CBC this PM, transfuse as needed for target Hgb > 7

## 2025-07-19 NOTE — PLAN OF CARE
Problem: Occupational Therapy  Goal: Occupational Therapy Goal  Description: Goals to be met by: 8/2/25     Patient will increase functional independence with ADLs by performing:    UE Dressing with [place].  LE Dressing with [facility].  Grooming while standing at sink with Modified [place].  Toileting from toilet with Modified [place] for hygiene and clothing management.   Bathing from  shower chair/bench with Min A.  Toilet transfer to toilet with Modified [place].    Outcome: Progressing   OT 's initial evaluation completed and POC established.

## 2025-07-19 NOTE — PT/OT/SLP EVAL
Physical Therapy Evaluation    Patient Name:  Tan Gilliam   MRN:  [id number]    Recommendations:     Discharge Recommendations: No Therapy Indicated   Discharge Equipment Recommendations: none   Barriers to discharge: None    Assessment:     Tan Gilliam is a 70 y.o. male admitted with a medical diagnosis of Femoral artery aneurysm, left.  He presents with the following impairments/functional limitations: weakness, impaired endurance, impaired self care skills, impaired functional mobility, gait instability, impaired balance, decreased lower extremity function, pain. Pt found ambulating in hallway with pt's son who is an acute care PT.     Rehab Prognosis: Good; patient would benefit from acute skilled PT services to address these deficits and reach maximum level of function.    Recent Surgery: Procedure(s) (LRB):  REPAIR, ANEURYSM, ARTERY, FEMORAL (Right) 2 Days Post-Op    Plan:     During this hospitalization, patient to be seen 3 x/week to address the identified rehab impairments via gait training, therapeutic activities, therapeutic exercises, neuromuscular re-education and progress toward the following goals:    Plan of Care Expires:  08/18/25    Subjective     Chief Complaint: intermittent spasms  Patient/Family Comments/goals: return home and to return to independent PLOF  Pain/Comfort:  Pain Rating 1: 0/10    Patients cultural, spiritual, Muslim conflicts given the current situation: no    Living Environment:  Living Environment: Patient lives with their spouse and son in a single story home with walk-in shower  Prior Level of Function: Prior to admission, patient was modified independent with ADLs using rolling walker for mobility.  Roles and Routines: Patient was driving and retired prior to admission. Pt enjoys fishing,  Equipment Used at Home: walker, rolling  Assistance Upon Discharge: family, patient's son is an acute care PT who will be home daily, but works during the day    Objective:      Communicated with RN prior to session.  Patient found ambulating in chaney with his son with peripheral IV  upon PT entry to room.    General Precautions: Standard, fall  Orthopedic Precautions:N/A   Braces: N/A  Respiratory Status: Room air    Exams:  Cognitive Exam:  Patient is oriented to Person, Place, Time, and Situation  Gross Motor Coordination:  WFL  Postural Exam:  Patient presented with the following abnormalities:    -       Rounded shoulders  -       Forward head  Sensation:    -       Intact, denies numbness/tingling  RLE ROM: WFL  RLE Strength: hip flexion 3/5, knee extension 4/5, knee flexion 4/5, DF 5/5; not maximally resisted 2/2 pain  LLE ROM: WFL  LLE Strength: hip flexion 3/5, knee extension 4/5, knee flexion 4/5, DF 5/5; not maximally resisted 2/2 pain    Functional Mobility:  Transfers:     Sit to Stand:  contact guard assistance with rolling walker  Gait: ~200 ft, CGA with gait belt per son, RW; decreased gait speed and step length, forward flexed posture      AM-PAC 6 CLICK MOBILITY  Total Score:17       Treatment & Education:  Discussed PT plan of care during hospitalization.   Patient educated on calling for assistance.   No questions verbalized this date.    Patient left up in chair with all lines intact, call button in reach, and pt's son and spouse present.    GOALS:   Multidisciplinary Problems       Physical Therapy Goals          Problem: Physical Therapy    Goal Priority Disciplines Outcome Interventions   Physical Therapy Goal     PT, PT/OT Progressing    Description: Goals to be met by: 2025     Patient will increase functional independence with mobility by performin. Supine to sit with Supervision  2. Sit to supine with Supervision  3. Sit to stand transfer with Supervision using RW  4. Gait  x 150 feet with Supervision using Rolling Walker.                        DME Justifications:  No DME recommended requiring DME justifications    History:     Past Medical  History:   Diagnosis Date    Asthma     CVA (cerebral vascular accident)     Hypertension        Past Surgical History:   Procedure Laterality Date    ANGIOGRAPHY Right 5/21/2024    Procedure: ANGIOGRAM - right lower extremity;  Surgeon: MOI Childers II, MD;  Location: [facility] OR 2ND FLR;  Service: Vascular;  Laterality: Right;    COLONOSCOPY N/A 4/25/2019    Procedure: COLONOSCOPY;  Surgeon: Luis Bogran-Reyes, MD;  Location: [facility];  Service: Endoscopy;  Laterality: N/A;    CREATION OF FEMOROPOPLITEAL ARTERIAL BYPASS USING GRAFT Right 5/21/2024    Procedure: CREATION, BYPASS, ARTERIAL, FEMORAL TO POPLITEAL, USING GRAFT;  Surgeon: MOI Childers II, MD;  Location: [facility] OR 2ND FLR;  Service: Vascular;  Laterality: Right;    CREATION OF FEMOROPOPLITEAL ARTERIAL BYPASS USING GRAFT Left 10/31/2024    Procedure: CREATION, BYPASS, ARTERIAL, FEMORAL TO POPLITEAL, USING GRAFT;  Surgeon: MOI Childers II, MD;  [facility];  Service: Vascular;  Laterality: Left;    HEMORRHOID SURGERY      NOSE SURGERY      REPAIR OF ANEURYSM OF FEMORAL ARTERY Left 3/5/2025    Procedure: REPAIR, ANEURYSM, ARTERY, FEMORAL;  Surgeon: MOI Childers II, MD;  Location: [facility] OR [facility] FLR;  Service: Vascular;  Laterality: Left;    REPAIR OF ANEURYSM OF FEMORAL ARTERY Right 7/17/2025    Procedure: REPAIR, ANEURYSM, ARTERY, FEMORAL;  Surgeon: MOI Childers II, MD;  Location: [facility] OR 2ND FLR;  Service: Vascular;  Laterality: Right;    TONSILLECTOMY         Time Tracking:     PT Received On: 07/19/25  PT Start Time: 1240     PT Stop Time: 1303  PT Total Time (min): 23 min     Billable Minutes: Evaluation 23 07/19/2025

## 2025-07-19 NOTE — NURSING
Patient complaining of spasmic pain near right side of groin. Pain not relieved with prn oxycodone. On call provider notified. Patient has oral temperature of 101.3, Md notified. MD assessed patient at bedside. Robaxin added to MAR.    0105: Patient's temp now 98.5 after scheduled tylenol given.

## 2025-07-19 NOTE — PLAN OF CARE
Problem: Physical Therapy  Goal: Physical Therapy Goal  Description: Goals to be met by: 2025     Patient will increase functional independence with mobility by performin. Supine to sit with Supervision  2. Sit to supine with Supervision  3. Sit to stand transfer with Supervision using RW  4. Gait  x 150 feet with Supervision using Rolling Walker.   Outcome: Progressing

## 2025-07-19 NOTE — PT/OT/SLP EVAL
Occupational Therapy Co-Evaluation     Name: Tan Gilliam  MRN: [id number]  Admitting Diagnosis: Femoral artery aneurysm, left 2 Days Post-Op  Recent Surgery: Procedure(s) (LRB):  REPAIR, ANEURYSM, ARTERY, FEMORAL (Right) 2 Days Post-Op    Pt was co-evaluated with Physical Therapy to assess abilities and/or deficits for appropriate skilled interventions due to medical complexity, low endurance, and for increased safety.    Recommendations:     Discharge Recommendations: No Therapy Indicated  Level of Assistance Recommended: 24 hours supervision and Intermittent assistance  Discharge Equipment Recommendations: none  Barriers to discharge: None    Assessment:     Tan Gilliam is a 70 y.o. male with a medical diagnosis of Femoral artery aneurysm, left. He presents with performance deficits affecting function including weakness, impaired endurance, impaired balance, decreased lower extremity function, pain. Pt is participating well with his family's assistance in acute setting. He will have good social support at discharge.     Rehab Prognosis: Good; patient would benefit from acute OT services to address these deficits and reach maximum level of function.    Plan:     Patient to be seen 2 x/week to address the above listed problems via self-care/home management, therapeutic activities, community/work re-entry, therapeutic exercises  Plan of Care Expires: 08/02/25  Plan of Care Reviewed with: patient, family    Subjective     Chief Complaint: bloating/gas  Patient Comments/Goals: agreed to therapy evaluation.  Pain/Comfort:  Pain Rating 1: 0/10    Patients cultural, spiritual, Restorationist conflicts given the current situation: no    Social History:  Living Environment: Patient lives with their spouse and son in a single story home with walk-in shower  Prior Level of Function: Prior to admission, patient was modified independent with ADLs using rolling walker for mobility.  Roles and Routines: Patient was driving and  retired prior to admission.  Equipment Used at Home: walker, rolling  Assistance Upon Discharge: family    Objective:     Communicated with nurse prior to session. Patient found ambulating in the hallway with his family member whose occupation is in Physical therapy    upon Ot's arrival.    General Precautions: Standard, fall, Greek speaking (family member translating)  Orthopedic Precautions: N/A   Braces: N/A    Respiratory Status: Room air    Occupational Performance    Gait belt applied - Yes    Functional Mobility/Transfers:  Sit <> Stand Transfer with contact guard assistance with rolling walker  Toilet Transfer Step Transfer technique with contact guard assistance with rolling walker  Functional Mobility: Gait in the hallway with CGA-SBA, RW.     Activities of Daily Living:  Bathing: reported Mod A, Pt assisted in the shower by his wife.   Lower Body Dressing: Mod A    Cognitive/Visual Perceptual:  Cognitive/Psychosocial Skills:    -     Oriented to: Person, Place, Time, Situation  -     Follows Commands/attention: Follows one-step commands  -     Memory: No Deficits noted  -     Safety awareness/insight to disability: intact  -     Mood/Affect/Coping skills/emotional control: Appropriate to situation  Visual/Perceptual:    -     Intact    Physical Exam:  Balance:    -     Sitting: stand by assistance  -     Standing: contact guard assistance  Sensation:    -       Intact  Motor Planning: Intact  Dominant hand: Right  Upper Extremity Range of Motion:     -       Right Upper Extremity: WNL  -       Left Upper Extremity: WNL  Upper Extremity Strength:    -       Right Upper Extremity: WFL  -       Left Upper Extremity: WFL   Strength:    -       Right Upper Extremity: WNL  -       Left Upper Extremity: WNL  Fine Motor Coordination:    -       Intact  Gross motor coordination:   WFL    AMPAC 6 Click ADL:  AMPAC Total Score: 20    Treatment & Education:  Educated on the importance of mobility to maximize  recovery  Educated on the importance of OOB mobility within safe range in order to decrease adverse effects of prolonged bedrest  Educated on safety with functional mobility; hand placement to ensure safe transfers to various surfaces in prep for ADLs  Pt verbalized understanding.    Patient clear to ambulate in hallways with RN/PCT.    Patient left up in chair with all lines intact, call button in reach, and spouse and family present.    GOALS:   Multidisciplinary Problems       Occupational Therapy Goals          Problem: Occupational Therapy    Goal Priority Disciplines Outcome Interventions   Occupational Therapy Goal     OT, PT/OT Progressing    Description: Goals to be met by: 8/2/25     Patient will increase functional independence with ADLs by performing:    UE Dressing with [place].  LE Dressing with [facility].  Grooming while standing at sink with Modified [place].  Toileting from toilet with Modified [place] for hygiene and clothing management.   Bathing from  shower chair/bench with Min A.  Toilet transfer to toilet with Modified [place].                         DME Justifications:  No DME recommended requiring DME justifications    History:     Past Medical History:   Diagnosis Date    Asthma     CVA (cerebral vascular accident)     Hypertension          Past Surgical History:   Procedure Laterality Date    ANGIOGRAPHY Right 5/21/2024    Procedure: ANGIOGRAM - right lower extremity;  Surgeon: MOI Childers II, MD;  Location: [address];  Service: Vascular;  Laterality: Right;    COLONOSCOPY N/A 4/25/2019    Procedure: COLONOSCOPY;  Surgeon: Luis Bogran-Reyes, MD;  Location: [facility];  Service: Endoscopy;  Laterality: N/A;    CREATION OF FEMOROPOPLITEAL ARTERIAL BYPASS USING GRAFT Right 5/21/2024    Procedure: CREATION, BYPASS, ARTERIAL, FEMORAL TO POPLITEAL, USING GRAFT;  Surgeon: MOI Childers II, MD;  Location: [address];  Service: Vascular;  Laterality: Right;     CREATION OF FEMOROPOPLITEAL ARTERIAL BYPASS USING GRAFT Left 10/31/2024    Procedure: CREATION, BYPASS, ARTERIAL, FEMORAL TO POPLITEAL, USING GRAFT;  Surgeon: MOI Childers II, MD;  Location: [facility] OR [address] [place] [address];  Service: Vascular;  Laterality: Left;    HEMORRHOID SURGERY      NOSE SURGERY      REPAIR OF ANEURYSM OF FEMORAL ARTERY Left 3/5/2025    Procedure: REPAIR, ANEURYSM, ARTERY, FEMORAL;  Surgeon: MOI Childers II, MD;  Location: [facility] OR [address] [place] [address];  Service: Vascular;  Laterality: Left;    REPAIR OF ANEURYSM OF FEMORAL ARTERY Right 7/17/2025    Procedure: REPAIR, ANEURYSM, ARTERY, FEMORAL;  Surgeon: MOI Childers II, MD;  Location: [facility] OR [address] [place] [address];  Service: Vascular;  Laterality: Right;    TONSILLECTOMY         Time Tracking:     OT Date of Treatment: 07/19/25  OT Start Time: 1240  OT Stop Time: 1304  OT Total Time (min): 24 min    Billable Minutes: Evaluation 24    7/19/2025

## 2025-07-19 NOTE — SUBJECTIVE & OBJECTIVE
Medications:  Continuous Infusions:  Scheduled Meds:   acetaminophen  650 mg Oral Q6H    amLODIPine  5 mg Oral Daily    aspirin  81 mg Oral Daily    atorvastatin  40 mg Oral Daily    carvediloL  6.25 mg Oral BID    docusate sodium  50 mg Oral Daily    fluticasone furoate-vilanteroL  1 puff Inhalation Daily    heparin (porcine)  5,000 Units Subcutaneous Q8H    lisinopriL  40 mg Oral QHS    methocarbamoL  750 mg Oral QID    mupirocin   Nasal BID    pantoprazole  40 mg Oral Daily    polyethylene glycol  17 g Oral Daily    tiotropium bromide  2 puff Inhalation Daily     PRN Meds:  Current Facility-Administered Medications:     0.9%  NaCl infusion (for blood administration), , Intravenous, Q24H PRN    albuterol, 2 puff, Inhalation, Q6H PRN    albuterol, 2 puff, Inhalation, Q6H PRN    docusate sodium, 100 mg, Oral, BID PRN    oxyCODONE, 5 mg, Oral, Q4H PRN    oxyCODONE, 10 mg, Oral, Q4H PRN     Objective:     Vital Signs (Most Recent):  Temp: 98.8 °F (37.1 °C) (07/19/25 0809)  Pulse: 66 (07/19/25 0809)  Resp: 16 (07/19/25 0809)  BP: (!) 107/58 (07/19/25 0809)  SpO2: 97 % (07/19/25 0809) Vital Signs (24h Range):  Temp:  [98.3 °F (36.8 °C)-101.3 °F (38.5 °C)] 98.8 °F (37.1 °C)  Pulse:  [66-77] 66  Resp:  [14-20] 16  SpO2:  [92 %-97 %] 97 %  BP: (102-153)/(58-74) 107/58          Physical Exam  Constitutional:       Appearance: He is overweight.   HENT:      Head: Atraumatic.      Nose: Nose normal.      Mouth/Throat:      Mouth: Mucous membranes are moist.   Cardiovascular:      Rate and Rhythm: Normal rate.      Pulses: Normal pulses.      Comments: Palpable Right DP and PT   Pulmonary:      Effort: Pulmonary effort is normal.   Abdominal:      General: Abdomen is flat.   Skin:     General: Skin is warm and dry.      Comments: Right groin incision staples and suture line CDI   Neurological:      Mental Status: He is alert.      Sensory: Sensation is intact.      Gait: Gait is intact.          Significant Labs:  All  pertinent labs from the last 24 hours have been reviewed.    Significant Diagnostics:  I have reviewed and interpreted all pertinent imaging results/findings within the past 24 hours.

## 2025-07-20 VITALS
WEIGHT: 198.19 LBS | RESPIRATION RATE: 16 BRPM | HEIGHT: 69 IN | DIASTOLIC BLOOD PRESSURE: 64 MMHG | OXYGEN SATURATION: 95 % | TEMPERATURE: 98 F | SYSTOLIC BLOOD PRESSURE: 119 MMHG | HEART RATE: 70 BPM | BODY MASS INDEX: 29.36 KG/M2

## 2025-07-20 LAB
ABSOLUTE EOSINOPHIL (OHS): 0.12 K/UL
ABSOLUTE MONOCYTE (OHS): 0.81 K/UL (ref 0.3–1)
ABSOLUTE NEUTROPHIL COUNT (OHS): 6.25 K/UL (ref 1.8–7.7)
ANION GAP (OHS): 9 MMOL/L (ref 8–16)
BASOPHILS # BLD AUTO: 0.05 K/UL
BASOPHILS NFR BLD AUTO: 0.6 %
BUN SERPL-MCNC: 15 MG/DL (ref 8–23)
CALCIUM SERPL-MCNC: 8.3 MG/DL (ref 8.7–10.5)
CHLORIDE SERPL-SCNC: 105 MMOL/L (ref 95–110)
CO2 SERPL-SCNC: 24 MMOL/L (ref 23–29)
CREAT SERPL-MCNC: 1.1 MG/DL (ref 0.5–1.4)
ERYTHROCYTE [DISTWIDTH] IN BLOOD BY AUTOMATED COUNT: 17.5 % (ref 11.5–14.5)
GFR SERPLBLD CREATININE-BSD FMLA CKD-EPI: >60 ML/MIN/1.73/M2
GLUCOSE SERPL-MCNC: 129 MG/DL (ref 70–110)
HCT VFR BLD AUTO: 26.6 % (ref 40–54)
HGB BLD-MCNC: 8 GM/DL (ref 14–18)
IMM GRANULOCYTES # BLD AUTO: 0.05 K/UL (ref 0–0.04)
IMM GRANULOCYTES NFR BLD AUTO: 0.6 % (ref 0–0.5)
LYMPHOCYTES # BLD AUTO: 1.68 K/UL (ref 1–4.8)
MAGNESIUM SERPL-MCNC: 2.1 MG/DL (ref 1.6–2.6)
MCH RBC QN AUTO: 22 PG (ref 27–31)
MCHC RBC AUTO-ENTMCNC: 30.1 G/DL (ref 32–36)
MCV RBC AUTO: 73 FL (ref 82–98)
NUCLEATED RBC (/100WBC) (OHS): 0 /100 WBC
PHOSPHATE SERPL-MCNC: 2.4 MG/DL (ref 2.7–4.5)
PLATELET # BLD AUTO: 252 K/UL (ref 150–450)
PMV BLD AUTO: 9.8 FL (ref 9.2–12.9)
POTASSIUM SERPL-SCNC: 4 MMOL/L (ref 3.5–5.1)
RBC # BLD AUTO: 3.64 M/UL (ref 4.6–6.2)
RELATIVE EOSINOPHIL (OHS): 1.3 %
RELATIVE LYMPHOCYTE (OHS): 18.8 % (ref 18–48)
RELATIVE MONOCYTE (OHS): 9 % (ref 4–15)
RELATIVE NEUTROPHIL (OHS): 69.7 % (ref 38–73)
SODIUM SERPL-SCNC: 138 MMOL/L (ref 136–145)
WBC # BLD AUTO: 8.96 K/UL (ref 3.9–12.7)

## 2025-07-20 PROCEDURE — 63600175 PHARM REV CODE 636 W HCPCS: Performed by: STUDENT IN AN ORGANIZED HEALTH CARE EDUCATION/TRAINING PROGRAM

## 2025-07-20 PROCEDURE — 25000003 PHARM REV CODE 250

## 2025-07-20 PROCEDURE — 36415 COLL VENOUS BLD VENIPUNCTURE: CPT | Performed by: STUDENT IN AN ORGANIZED HEALTH CARE EDUCATION/TRAINING PROGRAM

## 2025-07-20 PROCEDURE — 80048 BASIC METABOLIC PNL TOTAL CA: CPT | Performed by: STUDENT IN AN ORGANIZED HEALTH CARE EDUCATION/TRAINING PROGRAM

## 2025-07-20 PROCEDURE — 85025 COMPLETE CBC W/AUTO DIFF WBC: CPT | Performed by: STUDENT IN AN ORGANIZED HEALTH CARE EDUCATION/TRAINING PROGRAM

## 2025-07-20 PROCEDURE — 83735 ASSAY OF MAGNESIUM: CPT | Performed by: STUDENT IN AN ORGANIZED HEALTH CARE EDUCATION/TRAINING PROGRAM

## 2025-07-20 PROCEDURE — 25000003 PHARM REV CODE 250: Performed by: STUDENT IN AN ORGANIZED HEALTH CARE EDUCATION/TRAINING PROGRAM

## 2025-07-20 PROCEDURE — 84100 ASSAY OF PHOSPHORUS: CPT | Performed by: STUDENT IN AN ORGANIZED HEALTH CARE EDUCATION/TRAINING PROGRAM

## 2025-07-20 RX ORDER — CLOPIDOGREL BISULFATE 75 MG/1
75 TABLET ORAL DAILY
Status: DISCONTINUED | OUTPATIENT
Start: 2025-07-20 | End: 2025-07-20 | Stop reason: HOSPADM

## 2025-07-20 RX ORDER — CLOPIDOGREL BISULFATE 75 MG/1
75 TABLET ORAL DAILY
Qty: 90 TABLET | Refills: 3 | Status: SHIPPED | OUTPATIENT
Start: 2025-07-20 | End: 2026-07-20

## 2025-07-20 RX ORDER — OXYCODONE HYDROCHLORIDE 5 MG/1
5 TABLET ORAL EVERY 4 HOURS PRN
Qty: 28 TABLET | Refills: 0 | Status: SHIPPED | OUTPATIENT
Start: 2025-07-20

## 2025-07-20 RX ADMIN — ATORVASTATIN CALCIUM 40 MG: 40 TABLET, FILM COATED ORAL at 09:07

## 2025-07-20 RX ADMIN — HEPARIN SODIUM 5000 UNITS: 5000 INJECTION INTRAVENOUS; SUBCUTANEOUS at 05:07

## 2025-07-20 RX ADMIN — NAPROXEN 250 MG: 250 TABLET ORAL at 09:07

## 2025-07-20 RX ADMIN — METHOCARBAMOL 750 MG: 750 TABLET ORAL at 09:07

## 2025-07-20 RX ADMIN — ASPIRIN 81 MG: 81 TABLET, COATED ORAL at 09:07

## 2025-07-20 RX ADMIN — CLOPIDOGREL 75 MG: 75 TABLET ORAL at 12:07

## 2025-07-20 RX ADMIN — METHOCARBAMOL 750 MG: 750 TABLET ORAL at 12:07

## 2025-07-20 RX ADMIN — POLYETHYLENE GLYCOL 3350 17 G: 17 POWDER, FOR SOLUTION ORAL at 09:07

## 2025-07-20 RX ADMIN — ACETAMINOPHEN 650 MG: 325 TABLET ORAL at 12:07

## 2025-07-20 RX ADMIN — MUPIROCIN: 20 OINTMENT TOPICAL at 10:07

## 2025-07-20 NOTE — PROGRESS NOTES
[facility]  Vascular Surgery  Progress Note    Patient Name: Tan Gilliam  MRN: [id number]  Admission Date: 7/17/2025  Primary Care Provider: Cory Che MD    Subjective:     Interval History: No acute events overnight. Pain remains controlled. Able to shower yesterday and clean groins.     Post-Op Info:  Procedure(s) (LRB):  REPAIR, ANEURYSM, ARTERY, FEMORAL (Right)   3 Days Post-Op     Medications:  Continuous Infusions:  Scheduled Meds:   acetaminophen  650 mg Oral Q6H    amLODIPine  5 mg Oral Daily    aspirin  81 mg Oral Daily    atorvastatin  40 mg Oral Daily    carvediloL  6.25 mg Oral BID    docusate sodium  50 mg Oral Daily    fluticasone furoate-vilanteroL  1 puff Inhalation Daily    heparin (porcine)  5,000 Units Subcutaneous Q8H    lisinopriL  40 mg Oral QHS    methocarbamoL  750 mg Oral QID    mupirocin   Nasal BID    naproxen  250 mg Oral BID WM    pantoprazole  40 mg Oral Daily    polyethylene glycol  17 g Oral Daily    tiotropium bromide  2 puff Inhalation Daily     PRN Meds:  Current Facility-Administered Medications:     0.9%  NaCl infusion (for blood administration), , Intravenous, Q24H PRN    albuterol, 2 puff, Inhalation, Q6H PRN    docusate sodium, 100 mg, Oral, BID PRN    oxyCODONE, 5 mg, Oral, Q4H PRN    oxyCODONE, 10 mg, Oral, Q4H PRN     Objective:     Vital Signs (Most Recent):  Temp: 98.1 °F (36.7 °C) (07/20/25 0755)  Pulse: 70 (07/20/25 0755)  Resp: 16 (07/20/25 0755)  BP: 119/64 (07/20/25 0755)  SpO2: 95 % (07/20/25 0755) Vital Signs (24h Range):  Temp:  [97.9 °F (36.6 °C)-98.7 °F (37.1 °C)] 98.1 °F (36.7 °C)  Pulse:  [67-75] 70  Resp:  [16-18] 16  SpO2:  [93 %-97 %] 95 %  BP: (101-120)/(58-68) 119/64          Physical Exam  Constitutional:       Appearance: He is overweight.   HENT:      Head: Atraumatic.      Nose: Nose normal.      Mouth/Throat:      Mouth: Mucous membranes are moist.   Cardiovascular:      Rate and Rhythm: Normal rate.      Pulses: Normal pulses.       Comments: Palpable Right DP and PT   Pulmonary:      Effort: Pulmonary effort is normal.   Abdominal:      General: Abdomen is flat.   Skin:     General: Skin is warm and dry.      Comments: Right groin incision staples and suture line CDI   Neurological:      Mental Status: He is alert.      Sensory: Sensation is intact.      Gait: Gait is intact.          Significant Labs:  All pertinent labs from the last 24 hours have been reviewed.    Significant Diagnostics:  I have reviewed and interpreted all pertinent imaging results/findings within the past 24 hours.  Assessment/Plan:     * Femoral artery aneurysm, left  Tan Gilliam 70 y.o. 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) and previous left femoral artery aneurysm repair 3/5/35 now s/p R common femoral artery aneurysm repair 7/18    - Afebrile 24 hours, good IS  - PT/OT  - out of bed for all meals, mobilize  - ASA/plavix on d/c, hold xarelto (previously for hx of lower extremity aneurysms)   - MMPC  - PPI  - DVT Ppx  - Daily shower     Hypophosphatemia  - Phos 3.1 today  - replete as necessary      Acute blood loss anemia (ABLA)  - today: H/H 7.4/24.5  - CBC monitor trends   - repeat CBC this PM, transfuse as needed for target Hgb > 7    PAD (peripheral artery disease)  - ASA + statin      Mild intermittent asthma without complication  - ordered tiotropium bromide and fluticasone furoate-vilanteroL while inpatient as patient home medication tregely ellipta not available on formulary  - prn albuterol       Essential hypertension  - on home dose of lisinopril, coreg, and amlodipine        Raymond Moore MD  Vascular Surgery  [facility]

## 2025-07-20 NOTE — DISCHARGE SUMMARY
[facility]  Vascular Surgery  Discharge Summary      Patient Name: Tan Gilliam  MRN: [id number]  Admission Date: 7/17/2025  Hospital Length of Stay: 3 days  Discharge Date and Time: 07/20/2025 12:10 PM  Attending Physician: MOI Childers II, MD   Discharging Provider: Raymond Moore MD  Primary Care Provider: Cory Che MD    HPI:   No notes on file    Procedure(s) (LRB):  REPAIR, ANEURYSM, ARTERY, FEMORAL (Right)     Hospital Course: 70M who presented for elective repair of a left femoral aneurysm. He tolerated the procedure well. Postoperatively he was transferred to the floor. His thompson was removed and the voided spontaneously. His pain was well controlled on PO pain meds. His groin incision was changed, he was able to shower and care for his groin incision and keep dry. He did require 1u pRBC for hemoglobin drop to which he responded well tool. On day of discharge, he was ambuilating, cleared by PT to go home, voiding. His xarelto was discontinued now that his femoral aneurysm repaired, and discharged with prescription for ASA and plavix 75mg.         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    Specialty: Vascular Surgery  Why: For wound re-check  Contact information:  [phone]  [facility] [address]  [phone]                           Patient Instructions:   No discharge procedures on file.    Istrucciones de rafia para cirugía vascular  Ducharse cada aminata.   Permita que el agua y el jabón corran sobre marinelli incisión. Séquela con suaves toques después.  Por favor, no frote ni restriegue la incisión. Séquela dando golpecitos suaves.  No use loción sobre la incisión. Puede cubrirla con gasa estéril y cinta adhesiva si es necesario.    No bañarse para 6 semañas  No nadar en la piscina, en el nida, jacuzzi para 6 semañas   No levantar objetos de más de 10 libras (4,5 kg) (por ejemplo un galón de leche)    Llame de oficina de cirugía vascular si nota:   Signos de infección: pus, enojecimiento, fiebre de 100.4 °F (38 °C) o más rafia, escalofríos o melissa herida que no miguel, la hinchazón no mejora o empeora.    Llame al 911 immediateamente parar recibir ayunda de emergencia si presenta lo siguiente:  Dolor de pecho  Problemas para respirar  Pulso rápido  Mareos    Medications:  Reconciled Home Medications:      Medication List        START taking these medications      clopidogreL 75 mg tablet  Commonly known as: PLAVIX  [place] melissa tableta (75 mg en total) por vía oral diariamente.  (Take 1 tablet (75 mg total) by mouth once daily.)     oxyCODONE 5 MG immediate release tablet  Commonly known as: ROXICODONE  [place] 1 tableta (5 mg en total) por vía oral cada 4 (cuatro) horas según sea necesario para el dolor.  (Take 1 tablet (5 mg total) by mouth every 4 (four) hours as needed for Pain.)            CONTINUE taking these medications      acetaminophen 325 MG tablet  Commonly known as: TYLENOL  [place] 2 tabletas (650 mg en total) por vía oral cada 6 (seis) horas según sea necesario para el dolor.  (Take 2 tablets (650 mg total) by mouth every 6 (six) hours as needed for Pain.)     adalimumab-adbm 40 mg/0.8 mL Pnkt  Commonly known as: CYLTEZO(CF) PEN  Inject 1 pen  (40 mg total) into the skin  every 14 (fourteen) days.     amLODIPine 5 MG tablet  Commonly known as: NORVASC  Take 5 mg by mouth once daily.     ammonium lactate 12 % lotion  Commonly known as: LAC-HYDRIN  Apply topically 2 (two) times daily.     aspirin 81 MG EC tablet  Commonly known as: ECOTRIN  Take 81 mg by mouth once daily.     COREG 6.25 mg tablet  Generic drug: carvediloL  Take 6.25 mg by mouth 2 (two) times daily.     docusate sodium 100 MG capsule  Commonly known as: COLACE  Take 1 capsule (100 mg total) by mouth 2 (two) times daily as needed for Constipation (please take while taking opioid pain medication).     DUPIXENT  mg/2 mL Pnij  Generic drug: dupilumab  Inject 2 mLs (300 mg total) into the skin every 14 (fourteen) days.     lisinopriL 40 MG tablet  Commonly known as: PRINIVIL,ZESTRIL  TAKE 1 TABLET BY MOUTH ONCE DAILY IN THE EVENING     naproxen 500 MG tablet  Commonly known as: NAPROSYN     TRELEGY ELLIPTA 200-62.5-25 mcg inhaler  Generic drug: fluticasone-umeclidin-vilanter  INHALE 1 PUFF ONCE DAILY     * albuterol 90 mcg/actuation inhaler  Commonly known as: PROVENTIL/VENTOLIN HFA  Inhale 2 puffs into the lungs every 6 (six) hours as needed for Wheezing. Rescue     * VENTOLIN HFA 90 mcg/actuation inhaler  Generic drug: albuterol  INHALE 2 PUFFS BY MOUTH EVERY 6 HOURS AS NEEDED FOR WHEEZING           * This list has 2 medication(s) that are the same as other medications prescribed for you. Read the directions carefully, and ask your doctor or other care provider to review them with you.                ASK your doctor about these medications      HYDROcodone-acetaminophen 5-325 mg per tablet  Commonly known as: NORCO  [place] melissa tableta por vía oral cada 4 horas según sea necesario para el dolor.  (Take 1 tablet by mouth every 4 (four) hours as needed for Pain.)     HYDROmorphone 4 MG tablet  Commonly known as: DILAUDID  Take 0.5 tablets (2 mg total) by mouth every 4 (four) hours as needed for Pain (take half (0.5)  tablet as needed for pain. 2mg per dose.).     STOP TAKING XARELTO 20 mg Tab  Generic drug: rivaroxaban  [place] melissa tableta (20 mg en total) por vía oral diariamente.  (Take 1 tablet (20 mg total) by mouth once daily.)              Raymond Moore MD  Vascular Surgery  [facility] - Surgery

## 2025-07-20 NOTE — ASSESSMENT & PLAN NOTE
Tan Gilliam 70 y.o. 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

## 2025-07-20 NOTE — SUBJECTIVE & OBJECTIVE
Medications:  Continuous Infusions:  Scheduled Meds:   acetaminophen  650 mg Oral Q6H    amLODIPine  5 mg Oral Daily    aspirin  81 mg Oral Daily    atorvastatin  40 mg Oral Daily    carvediloL  6.25 mg Oral BID    docusate sodium  50 mg Oral Daily    fluticasone furoate-vilanteroL  1 puff Inhalation Daily    heparin (porcine)  5,000 Units Subcutaneous Q8H    lisinopriL  40 mg Oral QHS    methocarbamoL  750 mg Oral QID    mupirocin   Nasal BID    naproxen  250 mg Oral BID WM    pantoprazole  40 mg Oral Daily    polyethylene glycol  17 g Oral Daily    tiotropium bromide  2 puff Inhalation Daily     PRN Meds:  Current Facility-Administered Medications:     0.9%  NaCl infusion (for blood administration), , Intravenous, Q24H PRN    albuterol, 2 puff, Inhalation, Q6H PRN    docusate sodium, 100 mg, Oral, BID PRN    oxyCODONE, 5 mg, Oral, Q4H PRN    oxyCODONE, 10 mg, Oral, Q4H PRN     Objective:     Vital Signs (Most Recent):  Temp: 98.1 °F (36.7 °C) (07/20/25 0755)  Pulse: 70 (07/20/25 0755)  Resp: 16 (07/20/25 0755)  BP: 119/64 (07/20/25 0755)  SpO2: 95 % (07/20/25 0755) Vital Signs (24h Range):  Temp:  [97.9 °F (36.6 °C)-98.7 °F (37.1 °C)] 98.1 °F (36.7 °C)  Pulse:  [67-75] 70  Resp:  [16-18] 16  SpO2:  [93 %-97 %] 95 %  BP: (101-120)/(58-68) 119/64          Physical Exam  Constitutional:       Appearance: He is overweight.   HENT:      Head: Atraumatic.      Nose: Nose normal.      Mouth/Throat:      Mouth: Mucous membranes are moist.   Cardiovascular:      Rate and Rhythm: Normal rate.      Pulses: Normal pulses.      Comments: Palpable Right DP and PT   Pulmonary:      Effort: Pulmonary effort is normal.   Abdominal:      General: Abdomen is flat.   Skin:     General: Skin is warm and dry.      Comments: Right groin incision staples and suture line CDI   Neurological:      Mental Status: He is alert.      Sensory: Sensation is intact.      Gait: Gait is intact.          Significant Labs:  All pertinent labs from  the last 24 hours have been reviewed.    Significant Diagnostics:  I have reviewed and interpreted all pertinent imaging results/findings within the past 24 hours.

## 2025-07-20 NOTE — PLAN OF CARE
[facility] - Surgery  Discharge Final Note    Primary Care Provider: Cory Che MD    Expected Discharge Date: 7/20/2025    Final Discharge Note (most recent)       Final Note - 07/20/25 1300          Final Note    Assessment Type Final Discharge Note (P)      Anticipated Discharge Disposition Home or Self Care (P)      What phone number can be called within the next 1-3 days to see how you are doing after discharge? [phone] (P)         Post-Acute Status    Discharge Delays None known at this time (P)                      Important Message from Medicare             Contact Info       MOI Childers II, MD   Specialty: Vascular Surgery    [facility] [facility] [address]   Phone: [phone]       Next Steps: Follow up in 2 week(s)    Instructions: For wound re-check          Future Appointments   Date Time Provider Department Center   8/4/2025  2:40 PM Carlos Wang MD [facility]     Patient discharged with family, no discharge needs at the moment per medical team.      Discharge Plan A and Plan B have been determined by review of patient's clinical status, future medical and therapeutic needs, and coverage/benefits for post-acute care in coordination with multidisciplinary team members.    Carolyn Colon MSW, NORMANW  [facility]  Case Management Dept.

## 2025-07-20 NOTE — NURSING
Pt received home meds and initial dose of Plavix per MD order. Pt and family received discharge instructions and verbalized understanding. All questions answered. PIVs removed, catheters intact. NADN. Pt left unit via wheelchair with family and belongings.

## 2025-07-20 NOTE — HOSPITAL COURSE
70M who presented for elective repair of a left femoral aneurysm. He tolerated the procedure well. Postoperatively he was transferred to the floor. His thompson was removed and the voided spontaneously. His pain was well controlled on PO pain meds. His groin incision was changed, he was able to shower and care for his groin incision and keep dry. He did require 1u pRBC for hemoglobin drop to which he responded well tool. On day of discharge, he was ambuilating, cleared by PT to go home, voiding. His xarelto was discontinued now that his femoral aneurysm repaired, and discharged with prescription for ASA and plavix 75mg.

## 2025-07-20 NOTE — ASSESSMENT & PLAN NOTE
Tan Gilliam 70 y.o. 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

## 2025-07-21 LAB
ABO + RH BLD: NORMAL
ABO + RH BLD: NORMAL
BLD PROD TYP BPU: NORMAL
BLD PROD TYP BPU: NORMAL
BLOOD UNIT EXPIRATION DATE: NORMAL
BLOOD UNIT EXPIRATION DATE: NORMAL
BLOOD UNIT TYPE CODE: 6200
BLOOD UNIT TYPE CODE: 6200
CROSSMATCH INTERPRETATION: NORMAL
CROSSMATCH INTERPRETATION: NORMAL
DISPENSE STATUS: NORMAL
DISPENSE STATUS: NORMAL
RBCS: NORMAL
UNIT NUMBER: NORMAL
UNIT NUMBER: NORMAL

## 2025-08-04 ENCOUNTER — OFFICE VISIT (OUTPATIENT)
Dept: VASCULAR SURGERY | Facility: CLINIC | Age: 70
End: 2025-08-04
Attending: SURGERY
Payer: MEDICARE

## 2025-08-04 VITALS
DIASTOLIC BLOOD PRESSURE: 83 MMHG | HEIGHT: 66 IN | TEMPERATURE: 98 F | BODY MASS INDEX: 30.11 KG/M2 | HEART RATE: 67 BPM | SYSTOLIC BLOOD PRESSURE: 144 MMHG | WEIGHT: 187.38 LBS

## 2025-08-04 DIAGNOSIS — I72.4 FEMORAL ARTERY ANEURYSM: Primary | ICD-10-CM

## 2025-08-04 PROCEDURE — 99213 OFFICE O/P EST LOW 20 MIN: CPT | Mod: PBBFAC | Performed by: SURGERY

## 2025-08-04 PROCEDURE — 99024 POSTOP FOLLOW-UP VISIT: CPT | Mod: POP,,, | Performed by: SURGERY

## 2025-08-04 PROCEDURE — 99999 PR PBB SHADOW E&M-EST. PATIENT-LVL III: CPT | Mod: PBBFAC,,, | Performed by: SURGERY

## 2025-08-04 RX ORDER — SULFAMETHOXAZOLE AND TRIMETHOPRIM 800; 160 MG/1; MG/1
1 TABLET ORAL 2 TIMES DAILY
Qty: 20 TABLET | Refills: 0 | Status: SHIPPED | OUTPATIENT
Start: 2025-08-04 | End: 2025-08-14

## 2025-08-04 NOTE — PROGRESS NOTES
[facility]   Follow Up Visit    Patient Name: Tan Gilliam  YOB: 1955 (70 y.o.)  MRN: [id number]  Today's Date: 08/04/2025    Referring Md:   No referring provider defined for this encounter.    SUBJECTIVE:     Chief Complaint: s/p right femoral artery aneurysm 7/17/25    History of Present Illness:  Tan Gilliam is a 70 y.o. male with PMHx of R femoral artery aneurysm who presents to the clinic today for follow up of right femoral artery aneurysm repair .    Interval History 08/04/2025:   [id number]  Patient reports he is doing well post-operatively. Ambulating well. Reports some mild pain that improved after removal of sutures and staples in office. Reports good PO intake. Having bowel movements and urinating well. Denies fever, chills, nausea, vomiting. Denies pain in the leg after surgery.     Current Medications[1]  Review of patient's allergies indicates:   Allergen Reactions    Influenza virus vaccines Other (See Comments)     Past Medical History:   Diagnosis Date    Asthma     CVA (cerebral vascular accident)     Hypertension      Past Surgical History:   Procedure Laterality Date    ANGIOGRAPHY Right 5/21/2024    Procedure: ANGIOGRAM - right lower extremity;  Surgeon: MOI Childers II, MD;  Location: [address];  Service: Vascular;  Laterality: Right;    COLONOSCOPY N/A 4/25/2019    Procedure: COLONOSCOPY;  Surgeon: Luis Bogran-Reyes, MD;  Location: [facility];  Service: Endoscopy;  Laterality: N/A;    CREATION OF FEMOROPOPLITEAL ARTERIAL BYPASS USING GRAFT Right 5/21/2024    Procedure: CREATION, BYPASS, ARTERIAL, FEMORAL TO POPLITEAL, USING GRAFT;  Surgeon: MOI Childers II, MD;  Location: [address];  Service: Vascular;  Laterality: Right;    CREATION OF FEMOROPOPLITEAL ARTERIAL BYPASS USING GRAFT Left 10/31/2024    Procedure: CREATION, BYPASS, ARTERIAL, FEMORAL TO POPLITEAL, USING GRAFT;  Surgeon: MOI Childers II, MD;   "Location: [facility] OR [address] [place] [address];  Service: Vascular;  Laterality: Left;    HEMORRHOID SURGERY      NOSE SURGERY      REPAIR OF ANEURYSM OF FEMORAL ARTERY Left 3/5/2025    Procedure: REPAIR, ANEURYSM, ARTERY, FEMORAL;  Surgeon: MOI Childers II, MD;  Location: [facility] OR [address] [place] [address];  Service: Vascular;  Laterality: Left;    REPAIR OF ANEURYSM OF FEMORAL ARTERY Right 7/17/2025    Procedure: REPAIR, ANEURYSM, ARTERY, FEMORAL;  Surgeon: MOI Childers II, MD;  Location: [facility] OR [address] [place] [address];  Service: Vascular;  Laterality: Right;    TONSILLECTOMY       Family History   Problem Relation Name Age of Onset    Heart disease Mother      Asthma Mother      No Known Problems Father      Hypertension Sister      Hypertension Sister       Social History[2]     Review of Systems:  Review of Systems   All other systems reviewed and are negative.      OBJECTIVE:     Vital Signs (Most Recent)  BP (!) 144/83 (BP Location: Right arm, Patient Position: Sitting)   Pulse 67   Temp 98.4 °F (36.9 °C) (Oral)   Ht 5' 6" (1.676 m)   Wt 85 kg (187 lb 6.3 oz)   BMI 30.25 kg/m²     Physical Exam  Constitutional:       General: He is not in acute distress.     Appearance: Normal appearance. He is not toxic-appearing.   HENT:      Head: Normocephalic.   Eyes:      Extraocular Movements: Extraocular movements intact.   Cardiovascular:      Rate and Rhythm: Normal rate.   Pulmonary:      Effort: Pulmonary effort is normal. No respiratory distress.   Skin:     General: Skin is dry.      Comments: R groin incision c/d/I with nylon sutures and staples in place, no drainage  Area of induration/tenderness/erythema without fluctuance over superior area of incision   Neurological:      General: No focal deficit present.      Mental Status: He is alert.           ASSESSMENT/PLAN:     There are no diagnoses linked to this encounter.    Tan Gilliam is a 70 y.o. male with PMHx of R femoral artery aneurysm repair 07/17. Doing well post-operatively    - " Staples and sutures removed in office  - Prescribed 10d course of bactrim   - Follow up in clinic in 3 months with CTA abd/pelvis with BLE run-off  -d/c home Xarelto  -continue home ASA and plavix for multiple graft patency    Eloisa Thompson MD   General Surgery PGY-1  8/4/2025                [1]   Current Outpatient Medications   Medication Sig Dispense Refill    acetaminophen (TYLENOL) 325 MG tablet Take 2 tablets (650 mg total) by mouth every 6 (six) hours as needed for Pain. 30 tablet 0    adalimumab-adbm (CYLTEZO,CF, PEN) 40 mg/0.8 mL PnKt Inject 1 pen  (40 mg total) into the skin every 14 (fourteen) days. 2 pen 11    albuterol (PROVENTIL/VENTOLIN HFA) 90 mcg/actuation inhaler Inhale 2 puffs into the lungs every 6 (six) hours as needed for Wheezing. Rescue      amLODIPine (NORVASC) 5 MG tablet Take 5 mg by mouth once daily.      ammonium lactate (LAC-HYDRIN) 12 % lotion Apply topically 2 (two) times daily. 1 each 10    aspirin (ECOTRIN) 81 MG EC tablet Take 81 mg by mouth once daily.      clopidogreL (PLAVIX) 75 mg tablet Take 1 tablet (75 mg total) by mouth once daily. 90 tablet 3    COREG 6.25 mg tablet Take 6.25 mg by mouth 2 (two) times daily.      docusate sodium (COLACE) 100 MG capsule Take 1 capsule (100 mg total) by mouth 2 (two) times daily as needed for Constipation (please take while taking opioid pain medication). 30 capsule 0    dupilumab (DUPIXENT PEN) 300 mg/2 mL PnIj Inject 2 mLs (300 mg total) into the skin every 14 (fourteen) days. 4 mL 11    HYDROcodone-acetaminophen (NORCO) 5-325 mg per tablet Take 1 tablet by mouth every 4 (four) hours as needed for Pain. 28 tablet 0    HYDROmorphone (DILAUDID) 4 MG tablet Take 0.5 tablets (2 mg total) by mouth every 4 (four) hours as needed for Pain (take half (0.5) tablet as needed for pain. 2mg per dose.). 21 tablet 0    lisinopriL (PRINIVIL,ZESTRIL) 40 MG tablet TAKE 1 TABLET BY MOUTH ONCE DAILY IN THE EVENING 90 tablet 0    naproxen (NAPROSYN) 500 MG  tablet       oxyCODONE (ROXICODONE) 5 MG immediate release tablet Take 1 tablet (5 mg total) by mouth every 4 (four) hours as needed for Pain. 28 tablet 0    TRELEGY ELLIPTA 200-62.5-25 mcg inhaler INHALE 1 PUFF ONCE DAILY      VENTOLIN HFA 90 mcg/actuation inhaler INHALE 2 PUFFS BY MOUTH EVERY 6 HOURS AS NEEDED FOR WHEEZING 18 g 0    sulfamethoxazole-trimethoprim 800-160mg (BACTRIM DS) 800-160 mg Tab Take 1 tablet by mouth 2 (two) times daily. for 10 days 20 tablet 0     No current facility-administered medications for this visit.   [2]   Social History  Tobacco Use    Smoking status: Former     Current packs/day: 0.00     Types: Cigarettes, Cigars     Quit date:      Years since quittin.6    Smokeless tobacco: Never   Substance Use Topics    Alcohol use: Yes     Alcohol/week: 6.0 standard drinks of alcohol     Types: 6 Cans of beer per week    Drug use: Never

## 2025-08-07 DIAGNOSIS — I72.4 FEMORAL ARTERY ANEURYSM, RIGHT: Primary | ICD-10-CM

## (undated) DEVICE — DRAPE HALF SURGICAL 40X58IN

## (undated) DEVICE — ELECTRODE REM PLYHSV RETURN 9

## (undated) DEVICE — SYR ONLY LUER LOCK 20CC

## (undated) DEVICE — STOPCOCK DISCOFIX 3 WAY

## (undated) DEVICE — VALVATOME EXPANDABLE FEM

## (undated) DEVICE — CATH PIGTAIL

## (undated) DEVICE — SUT 2-0 12-18IN SILK

## (undated) DEVICE — HEMOSTAT SURGICEL 4X8IN

## (undated) DEVICE — ELECTRODE MEGADYNE RETURN DUAL

## (undated) DEVICE — BELLOW CANN HEMOBLAST 1.65GR

## (undated) DEVICE — DRAPE THREE-QTR REINF 53X77IN

## (undated) DEVICE — SUT PROLENE 6-0 24 BV-1

## (undated) DEVICE — SYR 30CC LUER LOCK

## (undated) DEVICE — SUT MONOCRYL 3-0 SH U/D

## (undated) DEVICE — SUT ETHILON 3/0 18IN PS-1

## (undated) DEVICE — SUT MCRYL PLUS 4-0 PS2 27IN

## (undated) DEVICE — HOOK LONE STAR BLUNT 12MM

## (undated) DEVICE — SUT 4-0 PROLENE SH-1 BL MON

## (undated) DEVICE — SUT 4-0 12-18IN SILK BLACK

## (undated) DEVICE — GUIDEWIRE STF .035X260CM ANG

## (undated) DEVICE — DRESSING ABSRBNT ISLAND 3.6X8

## (undated) DEVICE — SUT PROLENE 5-0 24 C-1 BL

## (undated) DEVICE — SUT PROLENE 5-0 36IN C-1

## (undated) DEVICE — CATH IV INTROCAN 20G X 1.1

## (undated) DEVICE — Device

## (undated) DEVICE — CLIP MED TICALL

## (undated) DEVICE — SUT 2-0 SILK 30IN BLK BRAID

## (undated) DEVICE — CATH URETHRAL RED RUBBER 18FR

## (undated) DEVICE — DECANTER FLUID TRNSF WHITE 9IN

## (undated) DEVICE — SUT SILK 2-0 SH 18IN BLACK

## (undated) DEVICE — SYR 10CC LUER LOCK

## (undated) DEVICE — SUT MONOCRYL 2-0 CT-1 VIL

## (undated) DEVICE — STOPCOCK 1 WAY PLASTIC

## (undated) DEVICE — PENCIL ROCKER SWITCH 10FT CORD

## (undated) DEVICE — TOWEL OR DISP STRL BLUE 4/PK

## (undated) DEVICE — LOOP VESSEL YELLOW MAXI

## (undated) DEVICE — SOL NACL 0.9% IV INJ 1000ML

## (undated) DEVICE — SUT 3-0 12-18IN SILK

## (undated) DEVICE — INSERTS STEALTH FIBRA SIZE 1.

## (undated) DEVICE — SUT PROLENE 4-0 RB-1 BL MO

## (undated) DEVICE — BANDAGE ELAS SOFTWRAP ST 6X5YD

## (undated) DEVICE — KIT INTRO MICRO NIT VSI 4FR

## (undated) DEVICE — TAPE GLOW & TELL NON-ST

## (undated) DEVICE — SYR MED RAD 150ML

## (undated) DEVICE — DRAPE BAG ISOLATION 20 X 20

## (undated) DEVICE — BANDAGE ESMARK 6X12

## (undated) DEVICE — TAPE UMBILICAL 1/8X36IN WHITE

## (undated) DEVICE — STAPLER SKIN PROXIMATE WIDE

## (undated) DEVICE — APPLICATOR CHLORAPREP ORN 26ML

## (undated) DEVICE — SUT VICRYL 3-0 27 SH

## (undated) DEVICE — GUIDEWIRE STD .035X180CM ANG

## (undated) DEVICE — SPONGE GAUZE 16PLY 4X4

## (undated) DEVICE — GOWN NONREINF SET-IN SLV 2XL

## (undated) DEVICE — SUT VICRYL CTD 2-0 GI 27 SH

## (undated) DEVICE — BLADE SCALP OPHTL BEVEL STR

## (undated) DEVICE — INTRODUCER VASC RADPQ 5FRX25CM

## (undated) DEVICE — SPONGE GAUZE 4X4 12 PLY STRL

## (undated) DEVICE — COVER INSTR ELASTIC BAND 40X20

## (undated) DEVICE — GOWN SURGICAL X-LARGE

## (undated) DEVICE — SUT PDS PLUS VIOLET LOOP 0 CTX

## (undated) DEVICE — CATH GUIDE 5F 65CM STR TAPER

## (undated) DEVICE — SUT 3/0 30IN ETHILON BLK M

## (undated) DEVICE — COVER LIGHT HANDLE 80/CA

## (undated) DEVICE — TRAY PERIPHERAL VASCULAR OMC

## (undated) DEVICE — SUT GORE-TEX CV-5 TTC-13 36

## (undated) DEVICE — PACK ECLIPSE UNIVERSAL STERILE

## (undated) DEVICE — DRAPE INCISE IOBAN 2 23X33IN

## (undated) DEVICE — BLLN CAT CODA 32MM

## (undated) DEVICE — INSERTS STEALTH FIBRA SIZE 2

## (undated) DEVICE — FORCEP SPETZLER MALIS 8IN 1MM

## (undated) DEVICE — APPLIER CLIP LIAGCLIP 9.375IN

## (undated) DEVICE — SUT CV-6 30 IN TTC-09NDL

## (undated) DEVICE — TRAY CATH 1-LYR URIMTR 16FR

## (undated) DEVICE — GUIDEWIRE STF .035X180CM ANG

## (undated) DEVICE — APPLIER LIGACLIP SM 9.38IN

## (undated) DEVICE — GAUZE SPONGE PEANUT STRL

## (undated) DEVICE — CATH EMBOLECTOMY 4F REGULAR

## (undated) DEVICE — SUT 7/0 24IN PROLENE BL MO

## (undated) DEVICE — SET BLD COLL SAFETY 21G X 3/4

## (undated) DEVICE — CLIP LIGACLIP XTRA TITANIUM

## (undated) DEVICE — PROPATEN VASCULAR GRAFT TW RR 8MMX40CM 30CM RINGS HEPARIN
Type: IMPLANTABLE DEVICE | Status: NON-FUNCTIONAL
Brand: GORE PROPATEN VASCULAR GRAFT

## (undated) DEVICE — CATH EMBOLECTOMY 3F REGULAR

## (undated) DEVICE — CATH VALVE BALLOON 23X4

## (undated) DEVICE — APPLIER LIGACLIP LG 13IN

## (undated) DEVICE — CATH GLIDE ANGLED 5FR 65CM

## (undated) DEVICE — CATH TORCOON NB ADV DAV